# Patient Record
Sex: FEMALE | Race: ASIAN | Employment: FULL TIME | URBAN - METROPOLITAN AREA
[De-identification: names, ages, dates, MRNs, and addresses within clinical notes are randomized per-mention and may not be internally consistent; named-entity substitution may affect disease eponyms.]

---

## 2017-01-02 ENCOUNTER — NURSE ONLY (OUTPATIENT)
Dept: FAMILY MEDICINE CLINIC | Facility: CLINIC | Age: 27
End: 2017-01-02

## 2017-01-02 VITALS
RESPIRATION RATE: 16 BRPM | WEIGHT: 146 LBS | OXYGEN SATURATION: 99 % | SYSTOLIC BLOOD PRESSURE: 122 MMHG | HEART RATE: 65 BPM | BODY MASS INDEX: 24 KG/M2 | TEMPERATURE: 98 F | DIASTOLIC BLOOD PRESSURE: 76 MMHG

## 2017-01-02 DIAGNOSIS — H65.92 LEFT SEROUS OTITIS MEDIA, UNSPECIFIED CHRONICITY: Primary | ICD-10-CM

## 2017-01-02 DIAGNOSIS — J01.00 ACUTE MAXILLARY SINUSITIS, RECURRENCE NOT SPECIFIED: ICD-10-CM

## 2017-01-02 PROCEDURE — 99203 OFFICE O/P NEW LOW 30 MIN: CPT | Performed by: PHYSICIAN ASSISTANT

## 2017-01-02 RX ORDER — FLUTICASONE PROPIONATE 50 MCG
2 SPRAY, SUSPENSION (ML) NASAL DAILY
Qty: 1 BOTTLE | Refills: 0 | Status: SHIPPED | OUTPATIENT
Start: 2017-01-02 | End: 2017-01-02

## 2017-01-02 RX ORDER — FLUTICASONE PROPIONATE 50 MCG
2 SPRAY, SUSPENSION (ML) NASAL DAILY
Qty: 1 BOTTLE | Refills: 0 | Status: SHIPPED | OUTPATIENT
Start: 2017-01-02 | End: 2017-02-01

## 2017-01-02 RX ORDER — AMOXICILLIN AND CLAVULANATE POTASSIUM 875; 125 MG/1; MG/1
1 TABLET, FILM COATED ORAL 2 TIMES DAILY
Qty: 20 TABLET | Refills: 0 | Status: SHIPPED | OUTPATIENT
Start: 2017-01-02 | End: 2017-01-12

## 2017-01-02 RX ORDER — AMOXICILLIN AND CLAVULANATE POTASSIUM 875; 125 MG/1; MG/1
1 TABLET, FILM COATED ORAL 2 TIMES DAILY
Qty: 20 TABLET | Refills: 0 | Status: SHIPPED | OUTPATIENT
Start: 2017-01-02 | End: 2017-01-02

## 2017-01-02 NOTE — PATIENT INSTRUCTIONS
Finish course of antibiotics for sinusitis. Take antibiotic with food.      You may eat yogurt or take probiotics over the counter (such as Florastor or Align) if you would like to replace the good bacteria in your GI tract that can be removed by antibioti Sinuses are air-filled spaces in the skull behind the face. They are kept moist and clean by a lining of mucosa. Things such as pollen, smoke, and chemical fumes can irritate the mucosa. It can then become inflamed (swell up).  As a response to irritation,

## 2017-01-02 NOTE — PROGRESS NOTES
CHIEF COMPLAINT:   Patient presents with:  Sinus Problem      HPI:   James Muñoz is a 32year old female who presents for sinus congestion for  6  days. Ear also feels clogged. Sinus sx started last week. Left ear is feeling full.   She has had an ear in Alcohol Use: Yes           0.0 oz/week       0 Standard drinks or equivalent per week       Comment: very rarely        REVIEW OF SYSTEMS:   GENERAL: feels well otherwise, no unplanned weight change,  dec appetite  SKIN: no rashes or abnormal skin lesion -     Discontinue: Fluticasone Propionate 50 MCG/ACT Nasal Suspension; 2 sprays by Each Nare route daily.  -     Fluticasone Propionate 50 MCG/ACT Nasal Suspension; 2 sprays by Each Nare route daily. PLAN: Meds as below.   Comfort care instructions You may eat yogurt or take probiotics over the counter (such as Florastor or Align) if you would like to replace the good bacteria in your GI tract that can be removed by antibiotics. If you do take probiotics, take them between doses of the antibiotic. Sinuses are air-filled spaces in the skull behind the face. They are kept moist and clean by a lining of mucosa. Things such as pollen, smoke, and chemical fumes can irritate the mucosa. It can then become inflamed (swell up).  As a response to irritation,

## 2017-01-03 ENCOUNTER — TELEPHONE (OUTPATIENT)
Dept: FAMILY MEDICINE CLINIC | Facility: CLINIC | Age: 27
End: 2017-01-03

## 2017-01-03 NOTE — TELEPHONE ENCOUNTER
On Augmentin for sinusitis. Took one dose last night and tolerated well. Took second dose this morning along with breakfast (oatmeal and yogurt)  3-4 hours after second dose patient states she had \"burning epigastric pain. ..  Felt like stomach was twisti

## 2017-01-31 ENCOUNTER — TELEPHONE (OUTPATIENT)
Dept: GASTROENTEROLOGY | Facility: CLINIC | Age: 27
End: 2017-01-31

## 2017-01-31 DIAGNOSIS — R50.9 FEVER, UNSPECIFIED FEVER CAUSE: Primary | ICD-10-CM

## 2017-01-31 DIAGNOSIS — R10.13 EPIGASTRIC ABDOMINAL PAIN: ICD-10-CM

## 2017-01-31 NOTE — TELEPHONE ENCOUNTER
Pt called to find out if she can begin taking protonix twice daily again due to increased stomach pains. Please call.

## 2017-02-01 NOTE — TELEPHONE ENCOUNTER
I spoke to Aurea Escobare pascual Tri and her aunt today. She is suffering focal epigastric burning pain not radiating. Had been on pantoprazole once daily continuously up until now. New fever 101 last night and this am without cough, URI sx or other localizing symptoms.

## 2017-02-03 ENCOUNTER — HOSPITAL ENCOUNTER (OUTPATIENT)
Dept: ULTRASOUND IMAGING | Age: 27
Discharge: HOME | End: 2017-02-03
Attending: INTERNAL MEDICINE
Payer: COMMERCIAL

## 2017-02-03 DIAGNOSIS — R50.9 FEVER, UNSPECIFIED FEVER CAUSE: ICD-10-CM

## 2017-02-03 DIAGNOSIS — R10.13 EPIGASTRIC ABDOMINAL PAIN: ICD-10-CM

## 2017-02-03 PROCEDURE — 76705 ECHO EXAM OF ABDOMEN: CPT

## 2017-06-14 RX ORDER — PANTOPRAZOLE SODIUM 40 MG/1
40 TABLET, DELAYED RELEASE ORAL
Qty: 60 TABLET | Refills: 5 | Status: SHIPPED | OUTPATIENT
Start: 2017-06-14 | End: 2017-12-24

## 2017-06-14 RX ORDER — PANTOPRAZOLE SODIUM 40 MG/1
40 TABLET, DELAYED RELEASE ORAL
Qty: 30 TABLET | Refills: 5 | Status: SHIPPED | OUTPATIENT
Start: 2017-06-14 | End: 2017-06-14

## 2017-06-14 NOTE — TELEPHONE ENCOUNTER
From: Harry Gregory  To:  Yris Farley MD  Sent: 6/14/2017 12:46 AM CDT  Subject: Medication Renewal Request    Original authorizing provider: MD Harry Jefferson would like a refill of the following medications:  Rolf Campos

## 2017-06-14 NOTE — TELEPHONE ENCOUNTER
Dr. Clarence Giraldo, Rx request for Pantoprazole Sodium 40mg. Please review. Thank you. LOV: 10/23/15, No upcoming appts scheduled.    Last Refill: 11/21/16

## 2017-07-31 ENCOUNTER — OFFICE VISIT (OUTPATIENT)
Dept: FAMILY MEDICINE CLINIC | Facility: CLINIC | Age: 27
End: 2017-07-31

## 2017-07-31 ENCOUNTER — HOSPITAL ENCOUNTER (OUTPATIENT)
Age: 27
Discharge: HOME | End: 2017-07-31
Attending: FAMILY MEDICINE
Payer: COMMERCIAL

## 2017-07-31 VITALS
HEIGHT: 65 IN | SYSTOLIC BLOOD PRESSURE: 133 MMHG | WEIGHT: 140 LBS | OXYGEN SATURATION: 100 % | HEART RATE: 73 BPM | DIASTOLIC BLOOD PRESSURE: 89 MMHG | TEMPERATURE: 98 F | BODY MASS INDEX: 23.32 KG/M2 | RESPIRATION RATE: 16 BRPM

## 2017-07-31 VITALS
SYSTOLIC BLOOD PRESSURE: 130 MMHG | DIASTOLIC BLOOD PRESSURE: 70 MMHG | TEMPERATURE: 98 F | HEART RATE: 92 BPM | RESPIRATION RATE: 14 BRPM

## 2017-07-31 DIAGNOSIS — Z02.9 ENCOUNTERS FOR ADMINISTRATIVE PURPOSE: Primary | ICD-10-CM

## 2017-07-31 DIAGNOSIS — L03.115 CELLULITIS OF RIGHT ANKLE: Primary | ICD-10-CM

## 2017-07-31 PROCEDURE — 99214 OFFICE O/P EST MOD 30 MIN: CPT

## 2017-07-31 PROCEDURE — 99213 OFFICE O/P EST LOW 20 MIN: CPT

## 2017-07-31 RX ORDER — CLINDAMYCIN HYDROCHLORIDE 300 MG/1
300 CAPSULE ORAL 3 TIMES DAILY
Qty: 30 CAPSULE | Refills: 0 | Status: SHIPPED | OUTPATIENT
Start: 2017-07-31 | End: 2017-08-10

## 2017-07-31 NOTE — ED PROVIDER NOTES
Patient Seen in: 5 [facility]    History   Patient presents with:  Bite (integumentary)    Stated Complaint: RIGHT FOOT RED PAIN    HPI    Pt is a 31 yo who presents with a 3 day h/o right ankle swelling and redness.  There °C)  Temp src: Oral  SpO2: 100 %  O2 Device: None (Room air)    Current:/89   Pulse 73   Temp 98.2 °F (36.8 °C) (Oral)   Resp 16   Ht 165.1 cm (5' 5\")   Wt 63.5 kg   LMP 07/15/2017 (Exact Date)   SpO2 100%   BMI 23.30 kg/m²         Physical Exam   C

## 2017-07-31 NOTE — PROGRESS NOTES
Indivual in NAD presents with concern of skin infection after a possible bug bite, duration 3 days, [place] patient on OCP. Observation of right medial ankle; mild erythema and swelling, no streaking, dry.  Passive dorsi/plantar f

## 2017-07-31 NOTE — ED INITIAL ASSESSMENT (HPI)
PATIENT ARRIVED AMBULATORY TO ROOM C/O AN INSECT BITE TO THE RIGHT ANKLE. PATIENT STATES \"I JUST GOT BACK FROM THE [facility]. I REMEMBER GETTING A BITE WHILE ON VACATION. I NOTICED AFTER THE FLIGHT MY ANKLE WAS SWOLLEN\" NO FEVERS.

## 2017-08-03 ENCOUNTER — OFFICE VISIT (OUTPATIENT)
Dept: FAMILY MEDICINE CLINIC | Facility: CLINIC | Age: 27
End: 2017-08-03

## 2017-08-03 VITALS
HEART RATE: 78 BPM | DIASTOLIC BLOOD PRESSURE: 82 MMHG | BODY MASS INDEX: 23.99 KG/M2 | TEMPERATURE: 98 F | WEIGHT: 144 LBS | SYSTOLIC BLOOD PRESSURE: 123 MMHG | HEIGHT: 65 IN

## 2017-08-03 DIAGNOSIS — L03.115 CELLULITIS OF RIGHT LOWER EXTREMITY: Primary | ICD-10-CM

## 2017-08-03 PROCEDURE — 99203 OFFICE O/P NEW LOW 30 MIN: CPT | Performed by: NURSE PRACTITIONER

## 2017-08-03 NOTE — PATIENT INSTRUCTIONS
Discharge Instructions for Cellulitis  You have been diagnosed with cellulitis. This is an infection in the deepest layer of the skin. In some cases, the infection also affects the muscle. Cellulitis is caused by bacteria.  The bacteria can enter the body Date Last Reviewed: 8/1/2016  © 6587-5875 The [address] [place] [address], [address] [place], [place] [address] [employer]. All rights reserved. This information is not intended as a substitute for professional medical care.  Always follow your healthcare professional'

## 2017-08-03 NOTE — PROGRESS NOTES
Pt presents for f/u from urgent care-was bit by a bug and right inner ankle became red, swollen and very painful (was traveling home from [address] [facility]).  Was started on Clindamycin-has been taking since Monday evening-s/s are much improved and redness and pa (300 mg total) by mouth 3 (three) times daily. Disp: 30 capsule Rfl: 0   Pantoprazole Sodium 40 MG Oral Tab EC Take 1 tablet (40 mg total) by mouth 2 (two) times daily before meals.  Disp: 60 tablet Rfl: 5   ORTHO TRI-CYCLEN LO 0.18/0.215/0.25 MG-25 MCG [employer]

## 2017-11-29 ENCOUNTER — OFFICE VISIT (OUTPATIENT)
Dept: FAMILY MEDICINE CLINIC | Facility: CLINIC | Age: 27
End: 2017-11-29

## 2017-11-29 VITALS
SYSTOLIC BLOOD PRESSURE: 120 MMHG | DIASTOLIC BLOOD PRESSURE: 80 MMHG | BODY MASS INDEX: 23.66 KG/M2 | WEIGHT: 142 LBS | HEART RATE: 58 BPM | HEIGHT: 65 IN

## 2017-11-29 DIAGNOSIS — Z01.419 WELL WOMAN EXAM WITH ROUTINE GYNECOLOGICAL EXAM: Primary | ICD-10-CM

## 2017-11-29 DIAGNOSIS — Z12.4 ENCOUNTER FOR PAPANICOLAOU SMEAR FOR CERVICAL CANCER SCREENING: ICD-10-CM

## 2017-11-29 DIAGNOSIS — H91.92 HEARING LOSS OF LEFT EAR, UNSPECIFIED HEARING LOSS TYPE: ICD-10-CM

## 2017-11-29 PROBLEM — L03.115 CELLULITIS OF RIGHT LOWER EXTREMITY: Status: RESOLVED | Noted: 2017-08-03 | Resolved: 2017-11-29

## 2017-11-29 PROCEDURE — 99395 PREV VISIT EST AGE 18-39: CPT | Performed by: NURSE PRACTITIONER

## 2017-11-29 RX ORDER — NORGESTIMATE AND ETHINYL ESTRADIOL 7DAYSX3 LO
1 KIT ORAL DAILY
Qty: 28 TABLET | Refills: 12 | Status: SHIPPED | OUTPATIENT
Start: 2017-11-29 | End: 2018-11-05

## 2017-11-29 NOTE — PROGRESS NOTES
HPI  Pt here for well adult and female visit. Had screening labs done earlier in year and pt states were wnl.     Well woman:   Periods regular on ocps-very heavy and irregular when off  Currently sexually active-monogomous  Birth control-ocp's takes daily History  Social History   Marital status: Single  Spouse name: N/A    Years of education: N/A  Number of children: N/A     Occupational History  None on file     Social History Main Topics   Smoking status: Never Smoker    Smokeless tobacco: Never Used Genitourinary: Rectum normal. No breast swelling, tenderness or discharge. No labial fusion. There is no rash, tenderness, lesion or injury on the right labia. There is no rash, tenderness, lesion or injury on the left labia.  Uterus is not deviated, not

## 2017-12-26 ENCOUNTER — OFFICE VISIT (OUTPATIENT)
Dept: FAMILY MEDICINE CLINIC | Facility: CLINIC | Age: 27
End: 2017-12-26

## 2017-12-26 VITALS
RESPIRATION RATE: 14 BRPM | TEMPERATURE: 98 F | OXYGEN SATURATION: 98 % | HEART RATE: 72 BPM | SYSTOLIC BLOOD PRESSURE: 120 MMHG | DIASTOLIC BLOOD PRESSURE: 74 MMHG

## 2017-12-26 DIAGNOSIS — J06.9 VIRAL UPPER RESPIRATORY TRACT INFECTION WITH COUGH: Primary | ICD-10-CM

## 2017-12-26 PROCEDURE — 99213 OFFICE O/P EST LOW 20 MIN: CPT | Performed by: PHYSICIAN ASSISTANT

## 2017-12-26 RX ORDER — PANTOPRAZOLE SODIUM 40 MG/1
TABLET, DELAYED RELEASE ORAL
Qty: 60 TABLET | Refills: 5 | Status: SHIPPED | OUTPATIENT
Start: 2017-12-26 | End: 2019-01-31 | Stop reason: ALTCHOICE

## 2017-12-26 NOTE — TELEPHONE ENCOUNTER
Pending Prescriptions Disp Refills    PANTOPRAZOLE SODIUM 40 MG Oral Tab EC [Pharmacy Med Name: Pantoprazole Sodium Oral Tablet Delayed Release 40 MG] 60 tablet 2     Sig: TAKE ONE TABLET BY MOUTH 2 TIMES PER DAY BEFORE MEALS         See refill request  Patient last seen 10-23-15.    Medication last refilled 6-14-17

## 2017-12-27 NOTE — PROGRESS NOTES
CHIEF COMPLAINT:   Patient presents with:  Cough/URI: cold sx since last wednesday, cough worse, ear popping, loss of voice      HPI:   Nneka Sparks is a 32year old female who presents for upper respiratory symptoms for  6 days.  Patient reports sore thro EARS: TM's non injected, left with moderate bulging, no retraction, no fluid, bony landmarks visible  NOSE: Nostrils patent, minimal nasal discharge, nasal mucosa mildly inflamed   THROAT: Oral mucosa pink, moist. Posterior pharynx is noned erythematous. · Relax, lie down. Go to bed if you want. Just get off your feet and rest. Also, drink plenty of fluids to avoid dehydration. · Take acetaminophen or a nonsteroidal anti-inflammatory agent (NSAID), such as ibuprofen.   Treat a troubled nose kindly  · Breat · Signs of dehydration, including extreme thirst, dark urine, infrequent urination, dry mouth  · Spotted, red, or very sore throat   Date Last Reviewed: 12/1/2016  © 7903-7669 The [address]. [address] [place], [address] [place] [address].  All rights

## 2017-12-29 ENCOUNTER — OFFICE VISIT (OUTPATIENT)
Dept: INTERNAL MEDICINE CLINIC | Facility: HOSPITAL | Age: 27
End: 2017-12-29
Attending: EMERGENCY MEDICINE

## 2017-12-29 DIAGNOSIS — J11.1 INFLUENZA: Primary | ICD-10-CM

## 2017-12-29 PROCEDURE — 87631 RESP VIRUS 3-5 TARGETS: CPT

## 2018-01-25 ENCOUNTER — APPOINTMENT (OUTPATIENT)
Dept: GENERAL RADIOLOGY | Age: 28
End: 2018-01-25
Attending: PHYSICIAN ASSISTANT
Payer: COMMERCIAL

## 2018-01-25 ENCOUNTER — HOSPITAL ENCOUNTER (OUTPATIENT)
Age: 28
Discharge: HOME | End: 2018-01-25
Payer: COMMERCIAL

## 2018-01-25 VITALS
OXYGEN SATURATION: 98 % | TEMPERATURE: 98 F | RESPIRATION RATE: 16 BRPM | SYSTOLIC BLOOD PRESSURE: 151 MMHG | DIASTOLIC BLOOD PRESSURE: 97 MMHG | HEART RATE: 76 BPM

## 2018-01-25 DIAGNOSIS — S50.01XA CONTUSION OF RIGHT ELBOW, INITIAL ENCOUNTER: Primary | ICD-10-CM

## 2018-01-25 DIAGNOSIS — S16.1XXA STRAIN OF NECK MUSCLE, INITIAL ENCOUNTER: ICD-10-CM

## 2018-01-25 PROCEDURE — 99213 OFFICE O/P EST LOW 20 MIN: CPT

## 2018-01-25 PROCEDURE — 73080 X-RAY EXAM OF ELBOW: CPT | Performed by: PHYSICIAN ASSISTANT

## 2018-01-26 NOTE — ED INITIAL ASSESSMENT (HPI)
REPORTS RIGHT ELBOW PAIN AFTER SLIPPING ON ICE YESTERDAY. SHAWNEE HURTADO AT BEDSIDE. ALSO C/O MUSCULAR NECK PAIN.

## 2018-01-26 NOTE — ED PROVIDER NOTES
Patient Seen in: [facility]    History   Patient presents with:  Upper Extremity Injury (musculoskeletal)    Stated Complaint: Elbow Injury    HPI    Patient is a 68-year-old female who presents for evaluation of R elbow Neck: Normal range of motion. Neck supple. Muscular tenderness present. No spinous process tenderness present. Normal range of motion present. No Brudzinski's sign and no Kernig's sign noted.    Full range of motion C-spine, tenderness over bilateral cervic

## 2018-02-15 ENCOUNTER — OFFICE VISIT (OUTPATIENT)
Dept: FAMILY MEDICINE CLINIC | Facility: CLINIC | Age: 28
End: 2018-02-15

## 2018-02-15 VITALS
SYSTOLIC BLOOD PRESSURE: 134 MMHG | TEMPERATURE: 99 F | DIASTOLIC BLOOD PRESSURE: 81 MMHG | BODY MASS INDEX: 23.82 KG/M2 | WEIGHT: 143 LBS | HEART RATE: 87 BPM | HEIGHT: 65 IN

## 2018-02-15 DIAGNOSIS — J11.1 INFLUENZA-LIKE ILLNESS: Primary | ICD-10-CM

## 2018-02-15 LAB
ADENOVIRUS PCR:: NEGATIVE
B PERT DNA SPEC QL NAA+PROBE: NEGATIVE
C PNEUM DNA SPEC QL NAA+PROBE: NEGATIVE
CORONAVIRUS 229E PCR:: NEGATIVE
CORONAVIRUS HKU1 PCR:: NEGATIVE
CORONAVIRUS NL63 PCR:: NEGATIVE
CORONAVIRUS OC43 PCR:: NEGATIVE
FLUAV RNA SPEC QL NAA+PROBE: NEGATIVE
FLUBV RNA SPEC QL NAA+PROBE: NEGATIVE
METAPNEUMOVIRUS PCR:: NEGATIVE
MYCOPLASMA PNEUMONIA PCR:: NEGATIVE
PARAINFLUENZA 1 PCR:: NEGATIVE
PARAINFLUENZA 2 PCR:: NEGATIVE
PARAINFLUENZA 3 PCR:: NEGATIVE
PARAINFLUENZA 4 PCR:: NEGATIVE
RHINOVIRUS/ENTERO PCR:: NEGATIVE
RSV RNA SPEC QL NAA+PROBE: NEGATIVE

## 2018-02-15 PROCEDURE — 99213 OFFICE O/P EST LOW 20 MIN: CPT | Performed by: NURSE PRACTITIONER

## 2018-02-15 NOTE — PROGRESS NOTES
HPI  Pt presents for Headache, fever (max 100.2), cough, body aches, sore throat and fatigue for past 3 days. Works on [employer] at [employer].  Got flu shot this year. Has been taking otc meds. Feeling a little better today.  Needs to be swabb Smoker    Smokeless tobacco: Never Used    Alcohol use Yes  0.0 oz/week     Comment: very rarely    Drug use: No    Sexual activity: Not on file     Other Topics Concern   None on file     Social History Narrative   None on file         Current Outpatient Judgment and thought content normal.   Nursing note and vitals reviewed. Assessment:     1. Influenza-like illness        Plan:     1.  Nasal swab for resp panel  Supportive care discussed    -If you/your child develops chest pain, shortness of breath,

## 2018-02-15 NOTE — PATIENT INSTRUCTIONS
Treatment of Flu    Knowing what to do if you get the flu may help you feel better sooner and prevent others from becoming sick. If you get the flu  Stay home and rest.  Drink fluids. Take medicines for fever such as acetaminophen or ibuprofen.   Clean this way. Consider wearing a mask when giving care to a sick person.   When to seek medical care  Call your health care provider within 48 hours if you or someone else with the flu:    Has common flu symptoms  Has these symptoms:  Not able to drink enough

## 2018-02-19 ENCOUNTER — NURSE ONLY (OUTPATIENT)
Dept: FAMILY MEDICINE CLINIC | Facility: CLINIC | Age: 28
End: 2018-02-19

## 2018-02-19 VITALS
SYSTOLIC BLOOD PRESSURE: 122 MMHG | WEIGHT: 146 LBS | BODY MASS INDEX: 24 KG/M2 | HEART RATE: 92 BPM | DIASTOLIC BLOOD PRESSURE: 74 MMHG | TEMPERATURE: 98 F | OXYGEN SATURATION: 100 %

## 2018-02-19 DIAGNOSIS — J01.01 ACUTE RECURRENT MAXILLARY SINUSITIS: ICD-10-CM

## 2018-02-19 DIAGNOSIS — J20.9 ACUTE BRONCHITIS, UNSPECIFIED ORGANISM: Primary | ICD-10-CM

## 2018-02-19 PROCEDURE — 99213 OFFICE O/P EST LOW 20 MIN: CPT | Performed by: PHYSICIAN ASSISTANT

## 2018-02-19 RX ORDER — AMOXICILLIN AND CLAVULANATE POTASSIUM 875; 125 MG/1; MG/1
1 TABLET, FILM COATED ORAL 2 TIMES DAILY
Qty: 20 TABLET | Refills: 0 | Status: SHIPPED | OUTPATIENT
Start: 2018-02-19 | End: 2018-03-01

## 2018-02-19 RX ORDER — ALBUTEROL SULFATE 90 UG/1
2 AEROSOL, METERED RESPIRATORY (INHALATION) EVERY 6 HOURS PRN
Qty: 1 INHALER | Refills: 0 | Status: SHIPPED | OUTPATIENT
Start: 2018-02-19 | End: 2018-10-14

## 2018-02-19 RX ORDER — BENZONATATE 200 MG/1
200 CAPSULE ORAL 3 TIMES DAILY PRN
Qty: 30 CAPSULE | Refills: 0 | Status: SHIPPED | OUTPATIENT
Start: 2018-02-19 | End: 2018-02-19

## 2018-02-19 RX ORDER — BENZONATATE 200 MG/1
200 CAPSULE ORAL 3 TIMES DAILY PRN
Qty: 30 CAPSULE | Refills: 0 | Status: SHIPPED | OUTPATIENT
Start: 2018-02-19 | End: 2018-10-14

## 2018-02-19 RX ORDER — ALBUTEROL SULFATE 90 UG/1
2 AEROSOL, METERED RESPIRATORY (INHALATION) EVERY 6 HOURS PRN
Qty: 1 INHALER | Refills: 0 | Status: SHIPPED | OUTPATIENT
Start: 2018-02-19 | End: 2018-02-19

## 2018-02-19 RX ORDER — AMOXICILLIN AND CLAVULANATE POTASSIUM 875; 125 MG/1; MG/1
1 TABLET, FILM COATED ORAL 2 TIMES DAILY
Qty: 20 TABLET | Refills: 0 | Status: SHIPPED | OUTPATIENT
Start: 2018-02-19 | End: 2018-02-19

## 2018-02-20 NOTE — PROGRESS NOTES
CHIEF COMPLAINT:   Patient presents with:  URI      HPI:   Yo Hill is a 32year old female who presents for upper respiratory symptoms for  1 weeks. Patient reports: cough, sore throat, sinus congestion, ears popping. Fever in the beginning.  Tested f REVIEW OF SYSTEMS:   GENERAL: feels well otherwise,    SKIN: no rashes or abnormal skin lesions  HEENT: See HPI  LUNGS: See HPI. Denies pleuritic pain.   CARDIOVASCULAR: denies chest pain or palpitations  GI: denies N/V/C or abdominal pain  NEURO: Denies h -     Discontinue: Albuterol Sulfate HFA (PROAIR HFA) 108 (90 Base) MCG/ACT Inhalation Aero Soln; Inhale 2 puffs into the lungs every 6 (six) hours as needed for Wheezing.  -     Amoxicillin-Pot Clavulanate 875-125 MG Oral Tab;  Take 1 tablet by mouth 2 (tw Acute or short-term bronchitis last for days or weeks. It occurs when the bronchial tubes (airways in the lungs) are irritated by a virus, bacteria, or allergen. This causes a cough that produces yellow or greenish mucus.   Inside Healthy Lungs  Air travels · Tests to check for an underlying condition, such as allergies, asthma, or COPD. You may be referred to a specialist for further lung function testing. Treating a Cough  The main treatment for bronchitis is easing symptoms.  Avoiding smoke, allergens, and · Wash your hands often. This helps reduce the chance of picking up viruses that cause colds and flu. Call [facility] Provider If:  · Symptoms worsen, or new symptoms develop. · Breathing problems worsen or  become severe.   · Symptoms don’t improve

## 2018-10-14 ENCOUNTER — NURSE ONLY (OUTPATIENT)
Dept: FAMILY MEDICINE CLINIC | Facility: CLINIC | Age: 28
End: 2018-10-14
Payer: COMMERCIAL

## 2018-10-14 VITALS
OXYGEN SATURATION: 99 % | HEIGHT: 65 IN | DIASTOLIC BLOOD PRESSURE: 78 MMHG | SYSTOLIC BLOOD PRESSURE: 122 MMHG | HEART RATE: 62 BPM | WEIGHT: 140 LBS | RESPIRATION RATE: 12 BRPM | TEMPERATURE: 97 F | BODY MASS INDEX: 23.32 KG/M2

## 2018-10-14 DIAGNOSIS — N30.01 ACUTE CYSTITIS WITH HEMATURIA: Primary | ICD-10-CM

## 2018-10-14 PROCEDURE — 87086 URINE CULTURE/COLONY COUNT: CPT | Performed by: NURSE PRACTITIONER

## 2018-10-14 PROCEDURE — 99213 OFFICE O/P EST LOW 20 MIN: CPT | Performed by: NURSE PRACTITIONER

## 2018-10-14 PROCEDURE — 81003 URINALYSIS AUTO W/O SCOPE: CPT | Performed by: NURSE PRACTITIONER

## 2018-10-14 RX ORDER — NITROFURANTOIN 25; 75 MG/1; MG/1
CAPSULE ORAL
Qty: 14 CAPSULE | Refills: 0 | Status: SHIPPED | OUTPATIENT
Start: 2018-10-14 | End: 2019-01-31

## 2018-10-14 RX ORDER — PHENAZOPYRIDINE HYDROCHLORIDE 200 MG/1
200 TABLET, FILM COATED ORAL 3 TIMES DAILY PRN
Qty: 10 TABLET | Refills: 0 | Status: SHIPPED | OUTPATIENT
Start: 2018-10-14 | End: 2019-01-31 | Stop reason: ALTCHOICE

## 2018-10-14 NOTE — PROGRESS NOTES
Aubrie Parsons is a 32year old female. CHIEF COMPLAINT:   Patient presents with:  UTI: dysuria, bladder spasms, urgency      HPI:   Patient presents with symptoms of UTI. Reports 1 day history of urinary frequency, urgency and dysuria.   Denies flank pain, LUNGS: clear to ausculation bilaterally, no wheezing or rhonchi  LYMPH: No cervical lymphadenopathy  : mild suprapubic tenderness, no bladder distention, no CVAT     ASSESSMENT AND PLAN:   James Muñoz is a 32year old female who presents with Patient p The most common place for an infection is in the bladder. This is called a bladder infection. This is one of the most common infections in women. Most bladder infections are easily treated. They are not serious unless the infection spreads to the kidney. Bladder infections are diagnosed by a urine test. They are treated with antibiotics and usually clear up quickly without complications. Treatment helps prevent a more serious kidney infection.   Medicines  Medicines can help in the treatment of a bladder in Call your healthcare provider if all symptoms are not gone after 3 days of treatment. This is especially important if you have repeat infections. If a culture was done, you will be told if your treatment needs to be changed.  If directed, you can call to f

## 2018-11-05 RX ORDER — NORGESTIMATE AND ETHINYL ESTRADIOL
KIT
Qty: 28 TABLET | Refills: 2 | Status: SHIPPED | OUTPATIENT
Start: 2018-11-05 | End: 2019-01-31

## 2019-01-26 RX ORDER — NORGESTIMATE AND ETHINYL ESTRADIOL
KIT
Qty: 28 TABLET | Refills: 2 | OUTPATIENT
Start: 2019-01-26

## 2019-01-28 RX ORDER — NORGESTIMATE AND ETHINYL ESTRADIOL 7DAYSX3 LO
1 KIT ORAL
Qty: 28 TABLET | Refills: 2 | OUTPATIENT
Start: 2019-01-28

## 2019-01-29 NOTE — TELEPHONE ENCOUNTER
Left message for pt to call us back. When pt returns the call please assist to make a follow up appt per below.

## 2019-01-31 ENCOUNTER — OFFICE VISIT (OUTPATIENT)
Dept: FAMILY MEDICINE CLINIC | Facility: CLINIC | Age: 29
End: 2019-01-31
Payer: COMMERCIAL

## 2019-01-31 ENCOUNTER — APPOINTMENT (OUTPATIENT)
Dept: LAB | Age: 29
End: 2019-01-31
Attending: NURSE PRACTITIONER
Payer: COMMERCIAL

## 2019-01-31 VITALS
DIASTOLIC BLOOD PRESSURE: 83 MMHG | WEIGHT: 152 LBS | SYSTOLIC BLOOD PRESSURE: 124 MMHG | HEART RATE: 54 BPM | BODY MASS INDEX: 25.33 KG/M2 | HEIGHT: 65 IN

## 2019-01-31 DIAGNOSIS — Z30.41 ENCOUNTER FOR SURVEILLANCE OF CONTRACEPTIVE PILLS: ICD-10-CM

## 2019-01-31 DIAGNOSIS — Z00.00 WELL ADULT EXAM: ICD-10-CM

## 2019-01-31 DIAGNOSIS — Z00.00 WELL ADULT EXAM: Primary | ICD-10-CM

## 2019-01-31 DIAGNOSIS — G47.9 SLEEP DIFFICULTIES: ICD-10-CM

## 2019-01-31 LAB
ALBUMIN SERPL BCP-MCNC: 4 G/DL (ref 3.5–4.8)
ALBUMIN/GLOB SERPL: 1.1 {RATIO} (ref 1–2)
ALP SERPL-CCNC: 44 U/L (ref 32–100)
ALT SERPL-CCNC: 14 U/L (ref 14–54)
ANION GAP SERPL CALC-SCNC: 11 MMOL/L (ref 0–18)
AST SERPL-CCNC: 18 U/L (ref 15–41)
BILIRUB SERPL-MCNC: 0.5 MG/DL (ref 0.3–1.2)
BUN SERPL-MCNC: 15 MG/DL (ref 8–20)
BUN/CREAT SERPL: 18.5 (ref 10–20)
CALCIUM SERPL-MCNC: 9.2 MG/DL (ref 8.5–10.5)
CHLORIDE SERPL-SCNC: 102 MMOL/L (ref 95–110)
CHOLEST SERPL-MCNC: 202 MG/DL (ref 110–200)
CO2 SERPL-SCNC: 25 MMOL/L (ref 22–32)
CREAT SERPL-MCNC: 0.81 MG/DL (ref 0.5–1.5)
DEPRECATED RDW RBC AUTO: 39.8 FL (ref 35.1–46.3)
ERYTHROCYTE [DISTWIDTH] IN BLOOD BY AUTOMATED COUNT: 12.4 % (ref 11–15)
EST. AVERAGE GLUCOSE BLD GHB EST-MCNC: 123 MG/DL (ref 68–126)
GLOBULIN PLAS-MCNC: 3.7 G/DL (ref 2.5–3.7)
GLUCOSE SERPL-MCNC: 91 MG/DL (ref 70–99)
HBA1C MFR BLD HPLC: 5.9 % (ref ?–5.7)
HCT VFR BLD AUTO: 41.4 % (ref 35–48)
HDLC SERPL-MCNC: 80 MG/DL
HGB BLD-MCNC: 13.5 G/DL (ref 12–16)
LDLC SERPL CALC-MCNC: 109 MG/DL (ref 0–99)
MCH RBC QN AUTO: 28.8 PG (ref 26–34)
MCHC RBC AUTO-ENTMCNC: 32.6 G/DL (ref 31–37)
MCV RBC AUTO: 88.3 FL (ref 80–100)
NONHDLC SERPL-MCNC: 122 MG/DL
OSMOLALITY UR CALC.SUM OF ELEC: 286 MOSM/KG (ref 275–295)
PATIENT FASTING: YES
PLATELET # BLD AUTO: 270 10(3)UL (ref 150–450)
POTASSIUM SERPL-SCNC: 3.6 MMOL/L (ref 3.3–5.1)
PROT SERPL-MCNC: 7.7 G/DL (ref 5.9–8.4)
RBC # BLD AUTO: 4.69 X10(6)UL (ref 3.8–5.3)
SODIUM SERPL-SCNC: 138 MMOL/L (ref 136–144)
TRIGL SERPL-MCNC: 65 MG/DL (ref 1–149)
TSH SERPL-ACNC: 0.65 UIU/ML (ref 0.45–5.33)
VIT B12 SERPL-MCNC: 408 PG/ML (ref 181–914)
WBC # BLD AUTO: 5.5 X10(3) UL (ref 4–11)

## 2019-01-31 PROCEDURE — 80061 LIPID PANEL: CPT

## 2019-01-31 PROCEDURE — 99395 PREV VISIT EST AGE 18-39: CPT | Performed by: NURSE PRACTITIONER

## 2019-01-31 PROCEDURE — 36415 COLL VENOUS BLD VENIPUNCTURE: CPT

## 2019-01-31 PROCEDURE — 80053 COMPREHEN METABOLIC PANEL: CPT

## 2019-01-31 PROCEDURE — 84443 ASSAY THYROID STIM HORMONE: CPT

## 2019-01-31 PROCEDURE — 82306 VITAMIN D 25 HYDROXY: CPT

## 2019-01-31 PROCEDURE — 82607 VITAMIN B-12: CPT

## 2019-01-31 PROCEDURE — 83036 HEMOGLOBIN GLYCOSYLATED A1C: CPT

## 2019-01-31 PROCEDURE — 85027 COMPLETE CBC AUTOMATED: CPT

## 2019-01-31 RX ORDER — NORGESTIMATE AND ETHINYL ESTRADIOL 7DAYSX3 LO
1 KIT ORAL
Qty: 3 PACKAGE | Refills: 3 | Status: SHIPPED | OUTPATIENT
Start: 2019-01-31 | End: 2019-11-22

## 2019-01-31 NOTE — ASSESSMENT & PLAN NOTE
Enc healthy eating and exercise  Screening labs ordered  Refill ocps  Had flu shot this season   Pap due 2/2020

## 2019-01-31 NOTE — PATIENT INSTRUCTIONS
Prevention Guidelines, Women Ages 25 to 44  Screening tests and vaccines are an important part of managing your health. A screening test is done to find possible disorders or diseases in people who don't have any symptoms.  The goal is to find a disease e Type 2 diabetes All women with prediabetes Every year   Gonorrhea Sexually active women at increased risk for infection At routine exams   Hepatitis C Anyone at increased risk At routine exams   HIV All women should be tested at least once for HIV between Meningococcal Women at increased risk for infection should talk with their healthcare provider 1 or more doses   Pneumococcal conjugate vaccine (PCV13) and pneumococcal polysaccharide vaccine (PPSV23) Women at increased risk for infection should talk with © 9528-0777 The [address]. [address] [facility] [place] [place]. All rights reserved. This information is not intended as a substitute for professional medical care. Always follow your healthcare professional's instructions.

## 2019-01-31 NOTE — PROGRESS NOTES
HPI  Pt here for refill of ocp's. Would like to do physical. Has weaned off pantaprazole. Has been watching diet a lot and knows what triggers it. Has been having sleep issues.   Can fall asleep but wakes up in a few hours after falling asleep and the Disorder Maternal Grandmother    • Lung Disorder Maternal Grandmother    • Diabetes Maternal Grandfather    • Cancer Maternal Grandfather    • Colon Cancer Maternal Grandfather    • Heart Disorder Maternal Grandfather    • Other (Other[other]) Paternal Gra discharge. Left eye exhibits no discharge. Neck: Normal range of motion. Neck supple. No thyromegaly present. Cardiovascular: Normal rate, regular rhythm and normal heart sounds. No murmur heard. Edema not present.   Pulmonary/Chest: Effort normal

## 2019-02-01 LAB — 25(OH)D3 SERPL-MCNC: 24.7 NG/ML (ref 30–100)

## 2019-11-22 ENCOUNTER — OFFICE VISIT (OUTPATIENT)
Dept: FAMILY MEDICINE CLINIC | Facility: CLINIC | Age: 29
End: 2019-11-22
Payer: COMMERCIAL

## 2019-11-22 VITALS
WEIGHT: 138 LBS | HEIGHT: 65 IN | DIASTOLIC BLOOD PRESSURE: 76 MMHG | SYSTOLIC BLOOD PRESSURE: 130 MMHG | HEART RATE: 62 BPM | BODY MASS INDEX: 22.99 KG/M2

## 2019-11-22 DIAGNOSIS — Z00.00 WELL ADULT EXAM: Primary | ICD-10-CM

## 2019-11-22 DIAGNOSIS — G47.9 SLEEP DIFFICULTIES: ICD-10-CM

## 2019-11-22 DIAGNOSIS — Z30.41 ENCOUNTER FOR SURVEILLANCE OF CONTRACEPTIVE PILLS: ICD-10-CM

## 2019-11-22 PROCEDURE — 99395 PREV VISIT EST AGE 18-39: CPT | Performed by: NURSE PRACTITIONER

## 2019-11-22 RX ORDER — NORGESTIMATE AND ETHINYL ESTRADIOL 7DAYSX3 LO
1 KIT ORAL
Qty: 3 PACKAGE | Refills: 3 | Status: SHIPPED | OUTPATIENT
Start: 2019-11-22 | End: 2020-01-02

## 2019-11-22 NOTE — ASSESSMENT & PLAN NOTE
Can try extended release melatonin  White noise or \"Weightless\" by Fabrizio Cannon  Please call if symptoms worsen or are not resolving.

## 2019-11-22 NOTE — PROGRESS NOTES
HPI  Pt here for annual physical.  Denies acute or chronic complaints. Working out 3-4 times per week. Has lost 14 lbs since January. Has improved her diet. Sleep has improved. Can fall asleep will sometimes have a hard time staying asleep.  S/s are Maternal Grandfather    • Cancer Maternal Grandfather    • Colon Cancer Maternal Grandfather    • Heart Disorder Maternal Grandfather    • Other (Other) Paternal Grandmother    • Other (Other) Paternal Grandfather        Social History    Socioeconomic His Murrel Apgar*    HIVES    Comment:Pt has taken similar medication as an adult             without issue    Physical Exam   Nursing note and vitals reviewed. Constitutional: She is oriented to person, place, and time. She appears well-developed and well-nourished. cardio is preferred.     Had flu shot   Due for pap next year  Enc healthy habits and activities         Encounter for surveillance of contraceptive pills     One year refill         Relevant Medications    Norgestim-Eth Estrad Triphasic (TRI-LO-OLEKSANDR) 0.1

## 2019-11-22 NOTE — PATIENT INSTRUCTIONS
Prevention Guidelines, Women Ages 25 to 44  Screening tests and vaccines are an important part of managing your health. A screening test is done to find possible disorders or diseases in people who don't have any symptoms.  The goal is to find a disease e Type 2 diabetes, prediabetes All women diagnosed with gestational diabetes Lifelong testing every 3 years   Type 2 diabetes All women with prediabetes Every year   Gonorrhea Sexually active women at increased risk for infection At routine exams   Hepatitis Measles, mumps, rubella (MMR) All women in this age group who have no record of these infections or vaccines 1 or 2 doses   Meningococcal Women at increased risk for infection should talk with their healthcare provider 1 or more doses   Pneumococcal conjug © 1945-4702 The [address]. [address] [facility], [facility] [place]. All rights reserved. This information is not intended as a substitute for professional medical care. Always follow your healthcare professional's instructions.         Pinky · Make sure the room is not too hot or too cold. If it’s not dark enough, an eye mask can help. If it’s noisy, try using earplugs. · Don't eat a large meal just before bedtime.   · Remove noises, bright lights, TVs, cell phones, and computers from your sle

## 2019-12-30 DIAGNOSIS — Z30.41 ENCOUNTER FOR SURVEILLANCE OF CONTRACEPTIVE PILLS: ICD-10-CM

## 2019-12-31 RX ORDER — NORGESTIMATE AND ETHINYL ESTRADIOL 7DAYSX3 LO
1 KIT ORAL
Qty: 3 PACKAGE | Refills: 3 | OUTPATIENT
Start: 2019-12-31

## 2019-12-31 RX ORDER — NORGESTIMATE AND ETHINYL ESTRADIOL
KIT
Qty: 84 TABLET | Refills: 3 | OUTPATIENT
Start: 2019-12-31

## 2020-01-02 ENCOUNTER — TELEPHONE (OUTPATIENT)
Dept: FAMILY MEDICINE CLINIC | Facility: CLINIC | Age: 30
End: 2020-01-02

## 2020-01-02 DIAGNOSIS — Z30.41 ENCOUNTER FOR SURVEILLANCE OF CONTRACEPTIVE PILLS: ICD-10-CM

## 2020-01-02 RX ORDER — NORGESTIMATE AND ETHINYL ESTRADIOL 7DAYSX3 LO
1 KIT ORAL
Qty: 3 PACKAGE | Refills: 3 | Status: SHIPPED | OUTPATIENT
Start: 2020-01-02 | End: 2020-11-30

## 2020-01-02 NOTE — TELEPHONE ENCOUNTER
Patient called in stating that pharmacy is denying having refills on medication below and patient is asking for assistance getting that to them. Confirmed pharmacy on chart. Please advise.        Current Outpatient Medications:   •  Norgestim-Eth Est

## 2020-01-02 NOTE — TELEPHONE ENCOUNTER
Pear the patient  [facility] and is asking to be [facility] from 11/22/19    [facility]

## 2020-01-02 NOTE — TELEPHONE ENCOUNTER
Pt returned the call and verified the pharmacy, [facility] in [place]. Informed her that there are 3 refills available. She will call the pharmacy to follow up.

## 2020-02-28 ENCOUNTER — TELEPHONE (OUTPATIENT)
Dept: FAMILY MEDICINE CLINIC | Facility: CLINIC | Age: 30
End: 2020-02-28

## 2020-02-28 ENCOUNTER — APPOINTMENT (OUTPATIENT)
Dept: LAB | Age: 30
End: 2020-02-28
Attending: PHYSICIAN ASSISTANT
Payer: COMMERCIAL

## 2020-02-28 ENCOUNTER — OFFICE VISIT (OUTPATIENT)
Dept: FAMILY MEDICINE CLINIC | Facility: CLINIC | Age: 30
End: 2020-02-28
Payer: COMMERCIAL

## 2020-02-28 ENCOUNTER — LAB ENCOUNTER (OUTPATIENT)
Dept: LAB | Age: 30
End: 2020-02-28
Attending: PHYSICIAN ASSISTANT
Payer: COMMERCIAL

## 2020-02-28 VITALS
BODY MASS INDEX: 22.66 KG/M2 | HEIGHT: 65 IN | TEMPERATURE: 98 F | HEART RATE: 60 BPM | WEIGHT: 136 LBS | SYSTOLIC BLOOD PRESSURE: 129 MMHG | DIASTOLIC BLOOD PRESSURE: 85 MMHG | RESPIRATION RATE: 19 BRPM

## 2020-02-28 DIAGNOSIS — R42 DIZZINESS: ICD-10-CM

## 2020-02-28 DIAGNOSIS — R42 DIZZINESS: Primary | ICD-10-CM

## 2020-02-28 LAB
ALT SERPL-CCNC: 33 U/L (ref 13–56)
ANION GAP SERPL CALC-SCNC: 6 MMOL/L (ref 0–18)
APPEARANCE: CLEAR
AST SERPL-CCNC: 46 U/L (ref 15–37)
BASOPHILS # BLD AUTO: 0.05 X10(3) UL (ref 0–0.2)
BASOPHILS NFR BLD AUTO: 1 %
BILIRUBIN: NEGATIVE
BUN BLD-MCNC: 21 MG/DL (ref 7–18)
BUN/CREAT SERPL: 23.9 (ref 10–20)
CALCIUM BLD-MCNC: 9.2 MG/DL (ref 8.5–10.1)
CHLORIDE SERPL-SCNC: 105 MMOL/L (ref 98–112)
CO2 SERPL-SCNC: 26 MMOL/L (ref 21–32)
CREAT BLD-MCNC: 0.88 MG/DL (ref 0.55–1.02)
DEPRECATED RDW RBC AUTO: 42.5 FL (ref 35.1–46.3)
EOSINOPHIL # BLD AUTO: 0.06 X10(3) UL (ref 0–0.7)
EOSINOPHIL NFR BLD AUTO: 1.2 %
ERYTHROCYTE [DISTWIDTH] IN BLOOD BY AUTOMATED COUNT: 12.7 % (ref 11–15)
EST. AVERAGE GLUCOSE BLD GHB EST-MCNC: 111 MG/DL (ref 68–126)
GLUCOSE (URINE DIPSTICK): NEGATIVE MG/DL
GLUCOSE BLD-MCNC: 85 MG/DL (ref 70–99)
HBA1C MFR BLD HPLC: 5.5 % (ref ?–5.7)
HCT VFR BLD AUTO: 43.4 % (ref 35–48)
HGB BLD-MCNC: 14 G/DL (ref 12–16)
IMM GRANULOCYTES # BLD AUTO: 0 X10(3) UL (ref 0–1)
IMM GRANULOCYTES NFR BLD: 0 %
KETONES (URINE DIPSTICK): NEGATIVE MG/DL
LEUKOCYTES: NEGATIVE
LYMPHOCYTES # BLD AUTO: 2.04 X10(3) UL (ref 1–4)
LYMPHOCYTES NFR BLD AUTO: 40.7 %
MCH RBC QN AUTO: 29.2 PG (ref 26–34)
MCHC RBC AUTO-ENTMCNC: 32.3 G/DL (ref 31–37)
MCV RBC AUTO: 90.6 FL (ref 80–100)
MONOCYTES # BLD AUTO: 0.24 X10(3) UL (ref 0.1–1)
MONOCYTES NFR BLD AUTO: 4.8 %
MULTISTIX LOT#: NORMAL NUMERIC
NEUTROPHILS # BLD AUTO: 2.62 X10 (3) UL (ref 1.5–7.7)
NEUTROPHILS # BLD AUTO: 2.62 X10(3) UL (ref 1.5–7.7)
NEUTROPHILS NFR BLD AUTO: 52.3 %
NITRITE, URINE: NEGATIVE
OSMOLALITY SERPL CALC.SUM OF ELEC: 286 MOSM/KG (ref 275–295)
PATIENT FASTING Y/N/NP: NO
PH, URINE: 6 (ref 4.5–8)
PLATELET # BLD AUTO: 240 10(3)UL (ref 150–450)
POTASSIUM SERPL-SCNC: 4.4 MMOL/L (ref 3.5–5.1)
PROTEIN (URINE DIPSTICK): NEGATIVE MG/DL
RBC # BLD AUTO: 4.79 X10(6)UL (ref 3.8–5.3)
SODIUM SERPL-SCNC: 137 MMOL/L (ref 136–145)
SPECIFIC GRAVITY: 1.02 (ref 1–1.03)
TSI SER-ACNC: 0.65 MIU/ML (ref 0.36–3.74)
URINE-COLOR: YELLOW
UROBILINOGEN,SEMI-QN: 0.2 MG/DL (ref 0–1.9)
WBC # BLD AUTO: 5 X10(3) UL (ref 4–11)

## 2020-02-28 PROCEDURE — 80048 BASIC METABOLIC PNL TOTAL CA: CPT

## 2020-02-28 PROCEDURE — 84460 ALANINE AMINO (ALT) (SGPT): CPT

## 2020-02-28 PROCEDURE — 81002 URINALYSIS NONAUTO W/O SCOPE: CPT | Performed by: PHYSICIAN ASSISTANT

## 2020-02-28 PROCEDURE — 36415 COLL VENOUS BLD VENIPUNCTURE: CPT

## 2020-02-28 PROCEDURE — 84450 TRANSFERASE (AST) (SGOT): CPT

## 2020-02-28 PROCEDURE — 93005 ELECTROCARDIOGRAM TRACING: CPT

## 2020-02-28 PROCEDURE — 84443 ASSAY THYROID STIM HORMONE: CPT

## 2020-02-28 PROCEDURE — 85025 COMPLETE CBC W/AUTO DIFF WBC: CPT

## 2020-02-28 PROCEDURE — 83036 HEMOGLOBIN GLYCOSYLATED A1C: CPT

## 2020-02-28 PROCEDURE — 99213 OFFICE O/P EST LOW 20 MIN: CPT | Performed by: PHYSICIAN ASSISTANT

## 2020-02-28 PROCEDURE — 93010 ELECTROCARDIOGRAM REPORT: CPT | Performed by: PHYSICIAN ASSISTANT

## 2020-02-28 NOTE — PROGRESS NOTES
HPI:     HPI  34year-old female is here in the office complaining of dizziness this morning when she woke up. Patient states that the dizziness occurs intermittently for every couple months.  Patient was seen Cardiologist since she was in high school and w Financial resource strain: Not on file      Food insecurity:        Worry: Not on file        Inability: Not on file      Transportation needs:        Medical: Not on file        Non-medical: Not on file    Tobacco Use      Smoking status: Never Smoker Oral   Weight: 136 lb (61.7 kg)   Height: 5' 5\" (1.651 m)     Body mass index is 22.63 kg/m². Physical Exam:   Physical Exam   Vitals reviewed. Constitutional: She is oriented to person, place, and time. She appears well-developed and well-nourished.

## 2020-06-29 ENCOUNTER — PATIENT MESSAGE (OUTPATIENT)
Dept: FAMILY MEDICINE CLINIC | Facility: CLINIC | Age: 30
End: 2020-06-29

## 2020-06-29 ENCOUNTER — NURSE TRIAGE (OUTPATIENT)
Dept: FAMILY MEDICINE CLINIC | Facility: CLINIC | Age: 30
End: 2020-06-29

## 2020-06-29 NOTE — TELEPHONE ENCOUNTER
From: Eloy Whitaker  To: Bobib Rubio PA-C  Sent: 6/29/2020 1:48 PM CDT  Subject: Non-Urgent Medical Question    Hi, just wanted to ask about some neck/trap pain I've been having since March. I think I sprained it lifting weights overhead.  Terrell Dow

## 2020-06-29 NOTE — TELEPHONE ENCOUNTER
Left message to call back    Patient has read message informing her to call office regarding her symptoms    Last read by Anne Canchola at 3:33 PM on 6/29/2020.

## 2020-06-29 NOTE — TELEPHONE ENCOUNTER
----- Message from [address] [place], [place] [address]Arnoldo Mariscal sent at 6/29/2020  1:48 PM CDT -----  Regarding: Non-Urgent Medical Question  Contact: [phone]  Hi, just wanted to ask about some neck/trap pain I've been having since March.  I think I sprained it lifting weights over

## 2020-06-30 ENCOUNTER — OFFICE VISIT (OUTPATIENT)
Dept: FAMILY MEDICINE CLINIC | Facility: CLINIC | Age: 30
End: 2020-06-30
Payer: COMMERCIAL

## 2020-06-30 ENCOUNTER — HOSPITAL ENCOUNTER (OUTPATIENT)
Dept: GENERAL RADIOLOGY | Age: 30
Discharge: HOME | End: 2020-06-30
Attending: PHYSICIAN ASSISTANT
Payer: COMMERCIAL

## 2020-06-30 VITALS
SYSTOLIC BLOOD PRESSURE: 144 MMHG | HEIGHT: 65 IN | RESPIRATION RATE: 18 BRPM | BODY MASS INDEX: 22.99 KG/M2 | HEART RATE: 69 BPM | DIASTOLIC BLOOD PRESSURE: 92 MMHG | WEIGHT: 138 LBS

## 2020-06-30 DIAGNOSIS — M54.2 CERVICALGIA: ICD-10-CM

## 2020-06-30 DIAGNOSIS — S16.1XXA STRAIN OF NECK MUSCLE, INITIAL ENCOUNTER: ICD-10-CM

## 2020-06-30 DIAGNOSIS — M54.2 CERVICALGIA: Primary | ICD-10-CM

## 2020-06-30 PROCEDURE — 99213 OFFICE O/P EST LOW 20 MIN: CPT | Performed by: PHYSICIAN ASSISTANT

## 2020-06-30 PROCEDURE — 72040 X-RAY EXAM NECK SPINE 2-3 VW: CPT | Performed by: PHYSICIAN ASSISTANT

## 2020-06-30 NOTE — PROGRESS NOTES
HPI:     Neck Pain    This is a new problem. The current episode started more than 1 month ago. The problem occurs intermittently. The problem has been unchanged. The pain is present in the midline. The quality of the pain is described as aching.  Pertinent Occupational History      Not on file    Social Needs      Financial resource strain: Not on file      Food insecurity:        Worry: Not on file        Inability: Not on file      Transportation needs:        Medical: Not on file        Non-medical: Not o Negative for dizziness, tingling, weakness, numbness and headaches. 06/30/20  1421 06/30/20  1437   BP: (!) 147/99 (!) 144/92   Pulse: 69    Resp: 18    Weight: 138 lb (62.6 kg)    Height: 5' 5\" (1.651 m)      Body mass index is 22.96 kg/m².     y

## 2020-06-30 NOTE — TELEPHONE ENCOUNTER
Please reply to pool: EM RN TRIAGE    Action Requested: Summary for Provider     []  Critical Lab, Recommendations Needed  [] Need Additional Advice  []   FYI    []   Need Orders  [] Need Medications Sent to Pharmacy  []  Other     SUMMARY: Office appoin

## 2020-07-01 PROBLEM — S16.1XXA NECK MUSCLE STRAIN: Status: ACTIVE | Noted: 2020-07-01

## 2020-07-06 ENCOUNTER — OFFICE VISIT (OUTPATIENT)
Dept: PHYSICAL THERAPY | Age: 30
End: 2020-07-06
Attending: PHYSICIAN ASSISTANT
Payer: COMMERCIAL

## 2020-07-06 DIAGNOSIS — M54.2 CERVICALGIA: ICD-10-CM

## 2020-07-06 PROCEDURE — 97161 PT EVAL LOW COMPLEX 20 MIN: CPT | Performed by: PHYSICAL THERAPIST

## 2020-07-06 NOTE — PROGRESS NOTES
CERVICAL SPINE EVALUATION:   Referring Physician: Leslie Almeida PA-C    Diagnosis: Cervicalgia (M54.2) Evaluation date: 7/6/2020   Date of Onset: March 2020  Visit # 1  Scheduled Visits 8  Insurance Authorized visits 10 PPO      SUBJECTIVE:   PATIEN pain and N/T  Functional deficits include but are not limited to driving, turning head to drive, sleeping and working out. Pt and PT discussed evaluation findings, pathology, POC and HEP.   Pt voiced understanding and performs HEP correctly without reported Timed Treatment: 5 min     Total Treatment Time: 30 min           PLAN OF CARE:    Goals: To be met in 4-6 weeks  1. Pt to be independent in their home exercise program, its’ progression, and management of their symptoms.   2. Pt to demonstrate and be able

## 2020-07-14 ENCOUNTER — OFFICE VISIT (OUTPATIENT)
Dept: PHYSICAL THERAPY | Age: 30
End: 2020-07-14
Attending: PHYSICIAN ASSISTANT
Payer: COMMERCIAL

## 2020-07-14 PROCEDURE — 97140 MANUAL THERAPY 1/> REGIONS: CPT

## 2020-07-14 PROCEDURE — 97110 THERAPEUTIC EXERCISES: CPT

## 2020-07-14 NOTE — PROGRESS NOTES
Dx:  Cervicalgia (M54.2)         Insurance (Authorized # of Visits): Approved 10 visits   (Ocie Ill PPO)  Authorizing Physician: Dr. Jose Rapp    Next MD visit: none scheduled  Fall Risk: standard           Precautions: None            Subjective: \"I feel bet Timed Treatment: 40 min  Total Treatment Time: 42 min

## 2020-07-23 ENCOUNTER — OFFICE VISIT (OUTPATIENT)
Dept: PHYSICAL THERAPY | Age: 30
End: 2020-07-23
Attending: PHYSICIAN ASSISTANT
Payer: COMMERCIAL

## 2020-07-23 PROCEDURE — 97110 THERAPEUTIC EXERCISES: CPT

## 2020-07-23 NOTE — PROGRESS NOTES
Dx:  Cervicalgia (M54.2)         Insurance (Authorized # of Visits): Approved 10 visits   (Marv BENAVIDEZ)  Authorizing Physician: Dr. Btety Feliz    Next MD visit: none scheduled  Fall Risk: standard           Precautions: None            Subjective: \"I feel bet -Seated posture education with lumbar roll   -Sleeping posture education  with one pillow with towel roll     Manual Therapy: seated manual traction with C-ret 10x10 sec hold (decreased symptoms)   Manual Therapy: Supine  manual traction with C-ret 10x1

## 2020-07-27 ENCOUNTER — OFFICE VISIT (OUTPATIENT)
Dept: PHYSICAL THERAPY | Age: 30
End: 2020-07-27
Attending: PHYSICIAN ASSISTANT
Payer: COMMERCIAL

## 2020-07-27 PROCEDURE — 97110 THERAPEUTIC EXERCISES: CPT | Performed by: PHYSICAL THERAPIST

## 2020-07-27 NOTE — PROGRESS NOTES
Dx:  Cervicalgia (M54.2)         Insurance (Authorized # of Visits): Approved 10 visits   (Jimmytesfaye Muir PPO)  Authorizing Physician: Dr. Racquel Rinaldi    Next MD visit: none scheduled  Fall Risk: standard           Precautions: None            Subjective: Pt reports t x20  -Standing push ups x20  -Prone scap I's and T's(thumbs up/down) x20     -Seated posture education with lumbar roll   -Sleeping posture education  with one pillow with towel roll     Manual Therapy: seated manual traction with C-ret 10x10 sec hold (dec

## 2020-07-30 ENCOUNTER — OFFICE VISIT (OUTPATIENT)
Dept: PHYSICAL THERAPY | Age: 30
End: 2020-07-30
Attending: PHYSICIAN ASSISTANT
Payer: COMMERCIAL

## 2020-07-30 PROCEDURE — 97110 THERAPEUTIC EXERCISES: CPT | Performed by: PHYSICAL THERAPIST

## 2020-07-30 NOTE — PROGRESS NOTES
Dx:  Cervicalgia (M54.2)         Insurance (Authorized # of Visits): Approved 10 visits   (Yessi Porras PPO)  Authorizing Physician: Dr. Shawn Ayala MD visit: none scheduled  Fall Risk: standard           Precautions: None            Subjective: Pt reports t min

## 2020-08-04 ENCOUNTER — OFFICE VISIT (OUTPATIENT)
Dept: PHYSICAL THERAPY | Age: 30
End: 2020-08-04
Attending: PHYSICIAN ASSISTANT
Payer: COMMERCIAL

## 2020-08-04 PROCEDURE — 97110 THERAPEUTIC EXERCISES: CPT | Performed by: PHYSICAL THERAPIST

## 2020-08-04 NOTE — PROGRESS NOTES
Dx:  Cervicalgia (M54.2)         Insurance (Authorized # of Visits): Approved 19 visits   (Kaycee Wilson PPO)  Authorizing Physician: Dr. Cristal Nageotte    Next MD visit: none scheduled  Fall Risk: standard           Precautions: None            Subjective: Pt reports t Progressed   4. Pt to demonstrate [facility] C-spine AROM in order to turn head to drive-In Progressed  5. Pt to increase B UE strength to 5/5 or better in order to put away items in cabinets     Plan: Cont PT per plan of care; pt to schedule more appts.     Charge

## 2020-08-11 ENCOUNTER — NURSE TRIAGE (OUTPATIENT)
Dept: FAMILY MEDICINE CLINIC | Facility: CLINIC | Age: 30
End: 2020-08-11

## 2020-08-11 ENCOUNTER — APPOINTMENT (OUTPATIENT)
Dept: GENERAL RADIOLOGY | Age: 30
End: 2020-08-11
Attending: NURSE PRACTITIONER
Payer: COMMERCIAL

## 2020-08-11 ENCOUNTER — HOSPITAL ENCOUNTER (OUTPATIENT)
Age: 30
Discharge: HOME | End: 2020-08-11
Payer: COMMERCIAL

## 2020-08-11 VITALS
HEART RATE: 68 BPM | RESPIRATION RATE: 18 BRPM | DIASTOLIC BLOOD PRESSURE: 97 MMHG | TEMPERATURE: 98 F | OXYGEN SATURATION: 100 % | SYSTOLIC BLOOD PRESSURE: 150 MMHG

## 2020-08-11 DIAGNOSIS — M26.622 TMJ TENDERNESS, LEFT: ICD-10-CM

## 2020-08-11 DIAGNOSIS — R68.84 JAW PAIN: Primary | ICD-10-CM

## 2020-08-11 PROCEDURE — 99214 OFFICE O/P EST MOD 30 MIN: CPT

## 2020-08-11 PROCEDURE — 70330 X-RAY EXAM OF JAW JOINTS: CPT | Performed by: NURSE PRACTITIONER

## 2020-08-11 PROCEDURE — 99213 OFFICE O/P EST LOW 20 MIN: CPT

## 2020-08-11 RX ORDER — CYCLOBENZAPRINE HCL 10 MG
10 TABLET ORAL 3 TIMES DAILY PRN
Qty: 20 TABLET | Refills: 0 | Status: SHIPPED | OUTPATIENT
Start: 2020-08-11 | End: 2020-08-18

## 2020-08-11 RX ORDER — METHYLPREDNISOLONE 4 MG/1
TABLET ORAL
Qty: 1 PACKAGE | Refills: 0 | Status: SHIPPED | OUTPATIENT
Start: 2020-08-11 | End: 2020-11-27

## 2020-08-11 NOTE — TELEPHONE ENCOUNTER
Action Requested: Summary for Provider     []  Critical Lab, Recommendations Needed  [] Need Additional Advice  []   FYI    []   Need Orders  [] Need Medications Sent to Pharmacy  []  Other     SUMMARY: Patient will be going to ER/IC for eval. Inability to

## 2020-08-11 NOTE — TELEPHONE ENCOUNTER
----- Message from [address] [place], [place] [address]Arnoldo Mariscal sent at 8/11/2020  6:18 AM CDT -----  Regarding: Non-Urgent Medical Question  Contact: [phone]  I've had problems with TMJ, jaw popping out of place a couple years ago.  Since Saturday left side jaw is stuck in closed

## 2020-08-11 NOTE — ED PROVIDER NOTES
Patient presents with:  Jaw Pain      HPI:     Delmis Rizzo is a 34year old female who presents for evaluation and management of a chief complaint of pain to the left TMJ area for the past 2 to 3 days.   She has had this problem in the past.  She Non-medical: Not on file    Tobacco Use      Smoking status: Never Smoker      Smokeless tobacco: Never Used    Substance and Sexual Activity      Alcohol use: Yes        Alcohol/week: 0.0 standard drinks        Comment: very rarely      Drug use:  No pain with palpation other than the left TMJ area. NEURO: No focal deficits.      MDM/Assessment/Plan:   Orders for this encounter:  Orders Placed This Encounter      XR TMJ- TM JOINT BILATERAL  (CPT=70330) Once          Order Specific Question: What is th

## 2020-08-11 NOTE — ED INITIAL ASSESSMENT (HPI)
PATIENT REPORTS SHE BELIEVES SHE HAS TMJ. STATES A FEW YEARS AGO SHE DISLOCATED HER JAW. THIS PAST Friday SHE STATES SHE WOKE UP WITH LEFT JAW TIGHTNESS, ON Saturday TIGHTNESS BECAME MORE PERSISTED. STATES SHE CAN NOT FULLY OPEN HER MOUTH.

## 2020-08-12 ENCOUNTER — OFFICE VISIT (OUTPATIENT)
Dept: PHYSICAL THERAPY | Age: 30
End: 2020-08-12
Attending: INTERNAL MEDICINE
Payer: COMMERCIAL

## 2020-08-12 ENCOUNTER — APPOINTMENT (OUTPATIENT)
Dept: PHYSICAL THERAPY | Age: 30
End: 2020-08-12
Attending: PHYSICIAN ASSISTANT
Payer: COMMERCIAL

## 2020-08-12 NOTE — PROGRESS NOTES
Patient arrived on time for PT treatment, however patient went to [address] [place] [address] yesterday for left side TMJ pain. Patient c/o left side jaw joint and the surrounding muscle and left UT and neck pain.  Per patient IC order muscle relaxer and steroid, I start last night

## 2020-08-14 ENCOUNTER — OFFICE VISIT (OUTPATIENT)
Dept: PHYSICAL THERAPY | Age: 30
End: 2020-08-14
Attending: PHYSICIAN ASSISTANT
Payer: COMMERCIAL

## 2020-08-14 PROCEDURE — 97110 THERAPEUTIC EXERCISES: CPT | Performed by: PHYSICAL THERAPIST

## 2020-08-14 NOTE — PROGRESS NOTES
Dx:  Cervicalgia (M54.2)         Insurance (Authorized # of Visits): Approved 19 visits   (Epi Sullivan PPO)  Authorizing Physician: Dr. Ravi Victoria    Next MD visit: none scheduled  Fall Risk: standard           Precautions: None            Subjective: Pt reports t OP    Goals: To be met in 4-6 weeks  1. Pt to be independent in their home exercise program, its’ progression, and management of their symptoms.-In Progressed  2.  Pt to demonstrate and be able to verbalize strategies to aid static postural control so pt ca

## 2020-08-20 ENCOUNTER — OFFICE VISIT (OUTPATIENT)
Dept: PHYSICAL THERAPY | Age: 30
End: 2020-08-20
Attending: PHYSICIAN ASSISTANT
Payer: COMMERCIAL

## 2020-08-20 PROCEDURE — 97110 THERAPEUTIC EXERCISES: CPT

## 2020-08-20 NOTE — PROGRESS NOTES
Dx:  Cervicalgia (M54.2)         Insurance (Authorized # of Visits): Approved 19 visits   [facility]  Authorizing Physician: Dr. Neftaly Tabares    Next MD visit: none scheduled  Fall Risk: standard           Precautions: None            Subjective: Patientt rep with 2#  -YTB hor abd 2x10  -YTB B ER x20   -Seated C-ret x10  -Seated C-ret with ext x10  -Seated C-ret with OP x10  -Seated c-ret with ext and c-rot x15  -Seated thoracic ext with ball x10  -Supine serratus punch with 5# x20  -Supine C-ret with ext EOB w

## 2020-08-24 ENCOUNTER — OFFICE VISIT (OUTPATIENT)
Dept: PHYSICAL THERAPY | Age: 30
End: 2020-08-24
Attending: PHYSICIAN ASSISTANT
Payer: COMMERCIAL

## 2020-08-24 PROCEDURE — 97110 THERAPEUTIC EXERCISES: CPT

## 2020-08-24 PROCEDURE — 97140 MANUAL THERAPY 1/> REGIONS: CPT

## 2020-08-24 NOTE — PROGRESS NOTES
Dx:  Cervicalgia (M54.2)         Insurance (Authorized # of Visits): Approved 19 visits   (Violetta Garcia PPO)  Authorizing Physician: Dr. Fiona Ayala MD visit: none scheduled  Fall Risk: standard           Precautions: None            Subjective: Patientt rep 2#  -YTB hor abd 2x10  -YTB B ER x20   -Seated C-ret x10  -Seated C-ret with ext x10  -Seated C-ret with OP x10  -Seated c-ret with ext and c-rot x15  -Seated thoracic ext with ball x10  -Supine serratus punch with 5# x20  -Supine C-ret with ext EOB with r order to turn head to drive-In Progressed  5. Pt to increase B UE strength to 5/5 or better in order to put away items in cabinets     Plan: Continued to C-spine extension based exercises, stretching  and scapular stabilization.      Charges: Ex x2, MM x1

## 2020-08-26 ENCOUNTER — OFFICE VISIT (OUTPATIENT)
Dept: PHYSICAL THERAPY | Age: 30
End: 2020-08-26
Attending: PHYSICIAN ASSISTANT
Payer: COMMERCIAL

## 2020-08-26 PROCEDURE — 97140 MANUAL THERAPY 1/> REGIONS: CPT | Performed by: PHYSICAL THERAPIST

## 2020-08-26 PROCEDURE — 97110 THERAPEUTIC EXERCISES: CPT | Performed by: PHYSICAL THERAPIST

## 2020-08-26 NOTE — PROGRESS NOTES
Dx:  Cervicalgia (M54.2)         Insurance (Authorized # of Visits): Approved 19 visits   ([facility])  Authorizing Physician: Dr. Cristal Nageotte    Next MD visit: none scheduled  Fall Risk: standard           Precautions: None            Subjective: Patient that Ex x2, MM x1     Total Timed Treatment: 42 min  Total Treatment Time: 45 min

## 2020-09-03 ENCOUNTER — OFFICE VISIT (OUTPATIENT)
Dept: PHYSICAL THERAPY | Age: 30
End: 2020-09-03
Attending: FAMILY MEDICINE
Payer: COMMERCIAL

## 2020-09-03 PROCEDURE — 97140 MANUAL THERAPY 1/> REGIONS: CPT

## 2020-09-03 PROCEDURE — 97110 THERAPEUTIC EXERCISES: CPT

## 2020-09-03 NOTE — PROGRESS NOTES
Dx:  Cervicalgia (M54.2)         Insurance (Authorized # of Visits): Approved 19 visits   (Sinan [facility]y [facility])  Authorizing Physician: Dr. Vickie Ayala MD visit: none scheduled  Fall Risk: standard           Precautions: None            Subjective: Patient that Progressed  2. Pt to demonstrate and be able to verbalize strategies to aid static postural control so pt can drive > 20 minutes with < 2/10 pain. -In Progressed  3. Pt to report ability to sleep >5 hours for 3 consecutive nights with reported pain 0/10.-In

## 2020-09-09 ENCOUNTER — OFFICE VISIT (OUTPATIENT)
Dept: PHYSICAL THERAPY | Age: 30
End: 2020-09-09
Attending: FAMILY MEDICINE
Payer: COMMERCIAL

## 2020-09-09 PROCEDURE — 97110 THERAPEUTIC EXERCISES: CPT

## 2020-09-09 NOTE — PROGRESS NOTES
Dx:  Cervicalgia (M54.2)         Insurance (Authorized # of Visits): Approved 19 visits   (Humaira Mueller PPO)  Authorizing Physician: Dr. Andria Brooks    Next MD visit: none scheduled  Fall Risk: standard           Precautions: None            Subjective: Patient repo to traps x 10 min Manual: STM to traps x 10 min   Manual:  \            HEP: 7/14: 8/4/20 C-spine retraction, ext, rot x10 reps 3-4 times per day (verbal)   Towel C-ret, supine C-ret with towel  7/23: Verbally C-ret with OP  -8/24/20: RHOMBOID AND MIDDLE T

## 2020-09-11 ENCOUNTER — PATIENT MESSAGE (OUTPATIENT)
Dept: FAMILY MEDICINE CLINIC | Facility: CLINIC | Age: 30
End: 2020-09-11

## 2020-09-11 DIAGNOSIS — M26.622 ARTHRALGIA OF LEFT TEMPOROMANDIBULAR JOINT: Primary | ICD-10-CM

## 2020-09-12 NOTE — TELEPHONE ENCOUNTER
Pastor Raul Fontanez=see below and advise. This nurse did not pend the medication due to different choices of doses.         From: Merian Hodgkin  To: JAM Merida  Sent: 9/11/2020  5:57 PM CDT  Subject: Prescription Question    Hello, I am g

## 2020-09-12 NOTE — TELEPHONE ENCOUNTER
From: [facility]  To: Edwardo Ortiz PA-C  Sent: 9/11/2020 5:53 PM CDT  Subject: Visit Follow-up Question    This is a follow up question from my previous visit about neck pain.  I was referred to have PT, which I have been attending and has h

## 2020-09-13 RX ORDER — ACETAZOLAMIDE 125 MG/1
125 TABLET ORAL 2 TIMES DAILY
Qty: 6 TABLET | Refills: 0 | Status: SHIPPED | OUTPATIENT
Start: 2020-09-13 | End: 2020-09-16

## 2020-09-14 ENCOUNTER — OFFICE VISIT (OUTPATIENT)
Dept: PHYSICAL THERAPY | Age: 30
End: 2020-09-14
Attending: FAMILY MEDICINE
Payer: COMMERCIAL

## 2020-09-14 PROCEDURE — 97110 THERAPEUTIC EXERCISES: CPT

## 2020-09-14 NOTE — PROGRESS NOTES
Dx:  Cervicalgia (M54.2)         Insurance (Authorized # of Visits): Approved 19 visits   (Bridgette BENAVIDEZ)  Authorizing Physician: Dr. Betty Shen MD visit: none scheduled  Fall Risk: standard           Precautions: None            Subjective: Patient repo symptoms)    -Prone ext with scap squeeze x10  -Prone T with thum up/down x15 ea  -Prone W, Y x10 ea   There Ex:  -SciFit UE L1 3F/3B  -Black TB high/low rows x20  -Black TB ext x20  -Black TB fwd punch x20  -Wall push up x15  -Wall slide with YTB x15  -Wa

## 2020-09-17 ENCOUNTER — OFFICE VISIT (OUTPATIENT)
Dept: PHYSICAL THERAPY | Age: 30
End: 2020-09-17
Attending: FAMILY MEDICINE
Payer: COMMERCIAL

## 2020-09-17 PROCEDURE — 97110 THERAPEUTIC EXERCISES: CPT

## 2020-09-17 NOTE — PROGRESS NOTES
Dx:  Cervicalgia (M54.2)         Insurance (Authorized # of Visits): Approved 19 visits   (Raymond Pink PPO)  Authorizing Physician: Dr. Izzy Welch    Next MD visit: none scheduled  Fall Risk: standard           Precautions: None            Subjective: Patient repo 3F/3B  -Black TB high/low rows x20  -Black TB ext x20  -Black TB fwd punch x20  -Wall push up x15  -Wall slide with YTB x15  -Wall lat walk with YTB 15ft x2    Seated scap squeeze 15 x5 sec hold  -Seated thoracic ext x10  -Seated C-ret x10  -Seated C-ret w based exercises, scapular strengthening/stabilization and posture education.      Charges: Ex x 3     Total Timed Treatment: 42 min  Total Treatment Time: 45 min

## 2020-09-23 ENCOUNTER — OFFICE VISIT (OUTPATIENT)
Dept: PHYSICAL THERAPY | Age: 30
End: 2020-09-23
Attending: FAMILY MEDICINE
Payer: COMMERCIAL

## 2020-09-23 PROCEDURE — 97110 THERAPEUTIC EXERCISES: CPT | Performed by: PHYSICAL THERAPIST

## 2020-09-23 NOTE — PROGRESS NOTES
Binh  Pt has attended 15 visits in Physical Therapy.        Dx:  Cervicalgia (M54.2)         Insurance (Authorized # of Visits): Approved 19 visits   (Thaddeus Lau PPO)  Authorizing Physician: Dr. Yamil Ayala MD visit: none scheduled  Fall Risk: x20  ea  Low row/ext with 1.5 kg x20 ea  Hori abd with 1.5 kig x20         There Ex:  -SciFit UE L1 3F/3B  -Seated scap squeeze 20 x5 sec hold    -Seated C-ret x10   -Seated C-ret with OP x10  -Seated C-ret with ext x10    -Wall slide x15 (c/o tired and fa

## 2020-09-24 ENCOUNTER — APPOINTMENT (OUTPATIENT)
Dept: PHYSICAL THERAPY | Age: 30
End: 2020-09-24
Attending: FAMILY MEDICINE
Payer: COMMERCIAL

## 2020-10-02 ENCOUNTER — OFFICE VISIT (OUTPATIENT)
Dept: PHYSICAL THERAPY | Age: 30
End: 2020-10-02
Attending: FAMILY MEDICINE
Payer: COMMERCIAL

## 2020-10-02 DIAGNOSIS — M26.622 ARTHRALGIA OF LEFT TEMPOROMANDIBULAR JOINT: ICD-10-CM

## 2020-10-02 PROCEDURE — 97162 PT EVAL MOD COMPLEX 30 MIN: CPT

## 2020-10-02 PROCEDURE — 97110 THERAPEUTIC EXERCISES: CPT

## 2020-10-02 NOTE — PROGRESS NOTES
TMJ EVALUATION:   Referring Physician: Dr. Marta Zuniga  Diagnosis: Arthralgia of left temporomandibular joint (S22.493)  Date of Onset: 8/8/20 Date of Service: 10/2/2020     PATIENT SUMMARY   Luigi Blandon is a 34year old y/o female who presents to ASSESSMENT:    Burgess Hahn presents to physical therapy evaluation with primary c/o  pain on L jaw and not able to open as wide as  Normal. pt states that there 2-3 episodes that she had issues with jaw locking  . Alayna Reynolds presented to therap moderately to severely  Restricted on the R, pain upon palpation.   Scapula stabilizers strength are not tested but she reports HEP from previous PT. BUE AROM are WFL, painfree with [facility] strength       Precautions:  None  OBJECTIVE:     Observation/Posture/f palpation on noted tight masticatory/paracervical  muscles above to minimal to no restriction. 3,Pt will report decreased in  frequency/intensity of  pain and symptoms to 50% or better in order to eat normally   4.   Pt will demonstrate improved mandibular prevents me from talking at all    SECTION 2 :Normal Living Activities ( brushing teeth and flossing) 1    0-I am able to care for my teeth and gums in a N fashion w/o restriction/pain and discomfort  1- I am able to care for all my teeth and gums but I mu -specialized jaw activities ( yawning. mouth opening and opening my mouth wide) 3    0- I can yawn in a N fashion,painlessly  1- I can yawn and open my mouth fully wide open but sometimes there is discomfort  2; I can yawn and open my mouth wide in N fashio completely control my pain with some form of treatment  2-I get partial but significant relief through some form of treatment  3- I don't get a lot of relief from any form of treatment  4- there is no form of treatment that helps enough to make me want to care.    X___________________________________________________ Date____________________    Certification From: 84/6/9362  To:12/31/2020 No - the patient is unable to be screened due to medical condition

## 2020-10-06 ENCOUNTER — OFFICE VISIT (OUTPATIENT)
Dept: PHYSICAL THERAPY | Age: 30
End: 2020-10-06
Attending: FAMILY MEDICINE
Payer: COMMERCIAL

## 2020-10-06 PROCEDURE — 97140 MANUAL THERAPY 1/> REGIONS: CPT

## 2020-10-06 PROCEDURE — 97110 THERAPEUTIC EXERCISES: CPT

## 2020-10-06 NOTE — PROGRESS NOTES
Dx: Arthralgia of left temporomandibular joint      Insurance   ppo         POC# of visits: 8          Authorized  # visits by insurance  : 8   Expiration date :  Authorizing Physician: Dr. Marta Zuniga Next MD visit: NA  Fall Risk: standard         Precautions: x6 mins  Level1-4 TMJ rotation  Translation x3-5 each  UT stretches 30 x3  Supine level TMJ level1-4 in supine x5 each  Supine R/L  Lateral deviation  Standing TMJ depression check x5  Theraex: Theraex: Theraex: Theraex:   Manual:seated UT/scalenes and med

## 2020-10-14 ENCOUNTER — OFFICE VISIT (OUTPATIENT)
Dept: PHYSICAL THERAPY | Age: 30
End: 2020-10-14
Attending: FAMILY MEDICINE
Payer: COMMERCIAL

## 2020-10-14 PROCEDURE — 97140 MANUAL THERAPY 1/> REGIONS: CPT

## 2020-10-14 PROCEDURE — 97110 THERAPEUTIC EXERCISES: CPT

## 2020-10-14 NOTE — PROGRESS NOTES
Dx: Arthralgia of left temporomandibular joint      Insurance   ppo         POC# of visits: 8          Authorized  # visits by insurance  : 8   Expiration date :  Authorizing Physician: Dr. Radha Ayala MD visit: NA  Fall Risk: standard         Precautions: level TMJ level1-4 in supine x5 each  Supine R/L  Lateral deviation  Standing TMJ depression check x5  Theraex:  UBE level 1 x6 mins    level4 rotation translation ex: seated and supine x5 each  Lateral deviation 2x10 Theraex: Theraex: Theraex:   Manual:se

## 2020-10-16 ENCOUNTER — OFFICE VISIT (OUTPATIENT)
Dept: PHYSICAL THERAPY | Age: 30
End: 2020-10-16
Attending: FAMILY MEDICINE
Payer: COMMERCIAL

## 2020-10-16 PROCEDURE — 97140 MANUAL THERAPY 1/> REGIONS: CPT

## 2020-10-16 PROCEDURE — 97110 THERAPEUTIC EXERCISES: CPT

## 2020-10-16 NOTE — PROGRESS NOTES
Dx: Arthralgia of left temporomandibular joint      Insurance   ppo         POC# of visits: 8          Authorized  # visits by insurance  : 8   Expiration date :  Authorizing Physician: Dr. Phu Pruitt Next MD visit: NA  Fall Risk: standard         Precautions: stretches 30 x3  Supine level TMJ level1-4 in supine x5 each  Supine R/L  Lateral deviation  Standing TMJ depression check x5  Theraex:  UBE level 1 x6 mins    level4 rotation translation ex: seated and supine x5 each  Lateral deviation 2x10 Theraex:  UBE

## 2020-10-19 ENCOUNTER — OFFICE VISIT (OUTPATIENT)
Dept: PHYSICAL THERAPY | Age: 30
End: 2020-10-19
Attending: FAMILY MEDICINE
Payer: COMMERCIAL

## 2020-10-19 PROCEDURE — 97140 MANUAL THERAPY 1/> REGIONS: CPT

## 2020-10-19 PROCEDURE — 97110 THERAPEUTIC EXERCISES: CPT

## 2020-10-19 NOTE — PROGRESS NOTES
Dx: Arthralgia of left temporomandibular joint      Insurance   ppo         POC# of visits: 8          Authorized  # visits by insurance  : 8   Expiration date :  Authorizing Physician: Dr. Hortencia Rodríguez Next MD visit: NA  Fall Risk: standard         Precautions: 10/6/2020  TX#: 2/8 Date:     10/14/20          TX#: 3/8 Date:     10/16/20         TX#: 4/8 Date:10/19/20               TX#: 5/8  FOTO Date:    Tx#: 6/   Theraex: UBE level 1 x6 mins  Level1-4 TMJ rotation  Translation x3-5 each  UT stretches 30 x3  Supine return demo Others: Others:  Others:     HEP: ( see pt instructions ) level 1-4 with emhpasis to move only to next level is better  10/14/20: intra oral techniques    Charges: man gage x2, theraex 1       Total Timed Treatment: 40 min  Total Treatment Time:

## 2020-10-28 ENCOUNTER — OFFICE VISIT (OUTPATIENT)
Dept: PHYSICAL THERAPY | Age: 30
End: 2020-10-28
Attending: FAMILY MEDICINE
Payer: COMMERCIAL

## 2020-10-28 PROCEDURE — 97140 MANUAL THERAPY 1/> REGIONS: CPT

## 2020-10-28 PROCEDURE — 97110 THERAPEUTIC EXERCISES: CPT

## 2020-10-28 NOTE — PROGRESS NOTES
Dx: Arthralgia of left temporomandibular joint      Insurance   ppo         POC# of visits: 8          Authorized  # visits by insurance  : 8   Expiration date :  Authorizing Physician: Dr. Sara Cruz Next MD visit: NA  Fall Risk: standard         Precautions: History : Y       Can bring back teeth in contact            R L   Upper trapezius Mod restricted mod restricted   Levator Scap moderately restricted moderately restricted   Scalenes/SCM minimally restricted Moderately restricted   Hyoids WNL WFL   Masseter x10 reps.  Pt with pain moving towards the L side  Supine cervical retraction 2x10 in supine and seated x10 x2 the cervical extension x5 x2    Manual:seated UT/scalenes and med scapula R/L  Supine paracervical, L SC,masticatory mm with emphasis on temporali

## 2020-10-30 ENCOUNTER — LAB ENCOUNTER (OUTPATIENT)
Dept: LAB | Age: 30
End: 2020-10-30
Attending: PREVENTIVE MEDICINE

## 2020-10-30 ENCOUNTER — TELEPHONE (OUTPATIENT)
Dept: INTERNAL MEDICINE CLINIC | Facility: HOSPITAL | Age: 30
End: 2020-10-30

## 2020-10-30 DIAGNOSIS — Z20.822 SUSPECTED COVID-19 VIRUS INFECTION: Primary | ICD-10-CM

## 2020-10-30 DIAGNOSIS — Z20.822 SUSPECTED COVID-19 VIRUS INFECTION: ICD-10-CM

## 2020-11-04 ENCOUNTER — OFFICE VISIT (OUTPATIENT)
Dept: PHYSICAL THERAPY | Age: 30
End: 2020-11-04
Attending: FAMILY MEDICINE
Payer: COMMERCIAL

## 2020-11-04 PROCEDURE — 97110 THERAPEUTIC EXERCISES: CPT

## 2020-11-04 PROCEDURE — 97140 MANUAL THERAPY 1/> REGIONS: CPT

## 2020-11-04 NOTE — PROGRESS NOTES
Dx: Arthralgia of left temporomandibular joint      Insurance   ppo         POC# of visits: 8          Authorized  # visits by insurance  : 8   Expiration date :  Authorizing Physician: Dr. Sara Cruz Next MD visit: NA  Fall Risk: standard         Precautions: of my teeth and gums because of restricted opening and or pain    SECTION 3: Normal Living Activities ( Eating/Chewing) 1    0-I can eat and chew as much of anything I want without pain/discomfort or jaw tired ness  1- I can eat and chew most anything I wa accustomed):1    0- I am able to engage in all my customary sexual activities and expressions w/o limitations and/or causing HA, face or jaw pain  1- I am able to  engage in all my customary sexual activities and expressions,but it sometimes causes some HA acceptable amount of sleep  3- I experience ringing in my ear(s) and it causes marked impairment in the performance of daily activities and unacceptable loss of sleep  4- I experience ringing in my ear(s) and it is incapacitating and forces me to use [facility] frequency/intensity of  pain and symptoms to 50% or better in order to eat normally :40%  4.   Pt will demonstrate improved mandibular dynamics with improved AROM to [facility] in all motions at least painfree 40mm on jaw depression,painfree lateral excursion and med/inferior  Ligamentum nuchae and C7/T1 junction  Intraoral with pt and PT external OP Manual:  Supine B UT, posterior cervical and SCM  Supine suboccipitals  Supine masticatory mm with emphasis on temporalis and masseter B then L side emphasis  Medial g

## 2020-11-07 ENCOUNTER — TELEMEDICINE (OUTPATIENT)
Dept: FAMILY MEDICINE CLINIC | Facility: CLINIC | Age: 30
End: 2020-11-07
Payer: COMMERCIAL

## 2020-11-07 DIAGNOSIS — Z20.822 CLOSE EXPOSURE TO 2019 NOVEL CORONAVIRUS: Primary | ICD-10-CM

## 2020-11-07 PROCEDURE — 99213 OFFICE O/P EST LOW 20 MIN: CPT | Performed by: PHYSICIAN ASSISTANT

## 2020-11-07 NOTE — PROGRESS NOTES
HPI: HPI    I spoke Jesenia Nielsen by telephone , verified date of birth, and discussed current concerns. Onset/duration of current symptoms: 3 days.     Common COVID-19 symptoms per CDC: CDC Clinical Guidance  • Fever:     Yes [x]     No [] Social History:   Social History    Socioeconomic History      Marital status: Single      Spouse name: Not on file      Number of children: Not on file      Years of education: Not on file      Highest education level: Not on file    Occupational [employer] Gastrointestinal: Negative for nausea, vomiting, abdominal pain, diarrhea, constipation, blood in stool and abdominal distention. Skin: Negative for rash. Neurological: Positive for headaches. Negative for dizziness, weakness and numbness.        Ther

## 2020-11-09 ENCOUNTER — APPOINTMENT (OUTPATIENT)
Dept: LAB | Age: 30
End: 2020-11-09
Attending: PHYSICIAN ASSISTANT
Payer: COMMERCIAL

## 2020-11-09 DIAGNOSIS — Z20.822 CLOSE EXPOSURE TO 2019 NOVEL CORONAVIRUS: ICD-10-CM

## 2020-11-12 ENCOUNTER — OFFICE VISIT (OUTPATIENT)
Dept: PHYSICAL THERAPY | Age: 30
End: 2020-11-12
Attending: FAMILY MEDICINE
Payer: COMMERCIAL

## 2020-11-12 PROCEDURE — 97140 MANUAL THERAPY 1/> REGIONS: CPT

## 2020-11-12 PROCEDURE — 97110 THERAPEUTIC EXERCISES: CPT

## 2020-11-12 NOTE — PROGRESS NOTES
Binh  Pt has attended 8 visits in Physical Therapy   Dx: Arthralgia of left temporomandibular joint      Insurance   ppo         POC# of visits: 8          Authorized  # visits by insurance  : 8  Authorizing Physician: Dr. Betty Bee correctly without reported pain. Instructions were provided on how to modify as need or when to stop. Larry Allen now with decreased tenderness/hypertonicity on paracervicals and masticatory muscles  . Noted to have improved TMDI score  22% to16 % improvement on improved AROM to [facility] in all motions at least painfree 40mm on jaw depression,painfree lateral excursion and protrusion:improved to 38 mm       Date: 11/4/20  TX#: 7/8 Date:     11/12/20          TX#: 8/8 Date:     10/16/20         TX#: 4/8 Date:10/19/20 pt instructions ) level 1-4 with emphasis to move only to next level is better  10/14/20: intra oral techniques    Charges: man gage x1, theraex 2       Total Timed Treatment: 40 min  Total Treatment Time: 40 min  TMDI:   SECTION 1:Communication :1    0-I c participate in N social life and/or recreational activities but pain/discomfort is increased  2- The presence of pain and or fear of likely aggravation only limits the more energetic components of my social life( sports, exercising,dancing,playing musical use of pain pills,anti-inflammatory meds or sleeping aides  2-I fail to realize 6 hrs of restful sleep even with the use of pills  3-I fail to realize 4 hrs of restful sleep even with the use of pills  4-I fail to realize 2 hrs of restful sleep even with t and treatment options and has agreed to actively participate in planning and for this course of care. Thank you for your referral. If you have any questions, please contact me at Dept: [phone].     Sincerely,  Electronically signed by therapist: Kuldip Salazar

## 2020-11-27 ENCOUNTER — OFFICE VISIT (OUTPATIENT)
Dept: FAMILY MEDICINE CLINIC | Facility: CLINIC | Age: 30
End: 2020-11-27
Payer: COMMERCIAL

## 2020-11-27 VITALS
HEART RATE: 61 BPM | BODY MASS INDEX: 24.32 KG/M2 | WEIGHT: 146 LBS | HEIGHT: 65 IN | SYSTOLIC BLOOD PRESSURE: 132 MMHG | DIASTOLIC BLOOD PRESSURE: 82 MMHG

## 2020-11-27 DIAGNOSIS — Z01.419 WELL WOMAN EXAM WITH ROUTINE GYNECOLOGICAL EXAM: Primary | ICD-10-CM

## 2020-11-27 DIAGNOSIS — N63.20 LEFT BREAST MASS: ICD-10-CM

## 2020-11-27 PROCEDURE — 3079F DIAST BP 80-89 MM HG: CPT | Performed by: NURSE PRACTITIONER

## 2020-11-27 PROCEDURE — 99395 PREV VISIT EST AGE 18-39: CPT | Performed by: NURSE PRACTITIONER

## 2020-11-27 PROCEDURE — 3075F SYST BP GE 130 - 139MM HG: CPT | Performed by: NURSE PRACTITIONER

## 2020-11-27 PROCEDURE — 3008F BODY MASS INDEX DOCD: CPT | Performed by: NURSE PRACTITIONER

## 2020-11-27 PROCEDURE — 99072 ADDL SUPL MATRL&STAF TM PHE: CPT | Performed by: NURSE PRACTITIONER

## 2020-11-27 NOTE — PROGRESS NOTES
HPI  Pt here for physical and pap. Eats well and exercises. fmh: htn, gr aunt breast cancer    On ocp-no problems periods regular  In monogamous relationship        Has left lower breast differnece. Had 2 small masses.      Had biometric screeni Grandfather    • Heart Disorder Maternal Grandfather    • Other (Other) Paternal Grandmother    • Other (Other) Paternal Grandfather        Social History    Socioeconomic History      Marital status: Single      Spouse name: Not on file      Number of chi without issue    Physical Exam   Nursing note and vitals reviewed. Constitutional: She is oriented to person, place, and time. She appears well-developed and well-nourished. No distress. HENT:   Head: Normocephalic and atraumatic.    Right Ear: Tympanic and fast foods. Try to get at least 150 minutes of exercise per week-a combination of weight resistance and cardio is preferred. Had flu shot.           Relevant Orders    GENITAL VAGINOSIS SCREEN (Completed)    THINPREP PAP SMEAR B    HPV HIGH RISK , T

## 2020-11-28 NOTE — ASSESSMENT & PLAN NOTE
Pap w hpv. Vag c/s  Had biometric screening done-will place results on mychart  Please aim to eat a diet high in fresh fruits and vegetables, lean protein sources, complex carbohydrates and limited processed and fast foods.   Try to get at least 150 minutes

## 2020-11-29 DIAGNOSIS — Z30.41 ENCOUNTER FOR SURVEILLANCE OF CONTRACEPTIVE PILLS: ICD-10-CM

## 2020-11-30 RX ORDER — NORGESTIMATE AND ETHINYL ESTRADIOL
KIT
Qty: 84 TABLET | Refills: 3 | Status: SHIPPED | OUTPATIENT
Start: 2020-11-30 | End: 2021-10-26

## 2020-12-01 ENCOUNTER — TELEPHONE (OUTPATIENT)
Dept: INTERNAL MEDICINE UNIT | Facility: HOSPITAL | Age: 30
End: 2020-12-01

## 2020-12-01 DIAGNOSIS — Z20.822 SUSPECTED COVID-19 VIRUS INFECTION: Primary | ICD-10-CM

## 2020-12-12 ENCOUNTER — LAB ENCOUNTER (OUTPATIENT)
Dept: LAB | Age: 30
End: 2020-12-12
Attending: NURSE PRACTITIONER
Payer: COMMERCIAL

## 2020-12-12 DIAGNOSIS — Z20.822 SUSPECTED COVID-19 VIRUS INFECTION: ICD-10-CM

## 2020-12-12 PROCEDURE — 36415 COLL VENOUS BLD VENIPUNCTURE: CPT

## 2020-12-12 PROCEDURE — 86769 SARS-COV-2 COVID-19 ANTIBODY: CPT

## 2020-12-30 ENCOUNTER — HOSPITAL ENCOUNTER (OUTPATIENT)
Dept: MAMMOGRAPHY | Facility: HOSPITAL | Age: 30
Discharge: HOME | End: 2020-12-30
Attending: NURSE PRACTITIONER
Payer: COMMERCIAL

## 2020-12-30 ENCOUNTER — HOSPITAL ENCOUNTER (OUTPATIENT)
Dept: ULTRASOUND IMAGING | Facility: HOSPITAL | Age: 30
Discharge: HOME | End: 2020-12-30
Attending: NURSE PRACTITIONER
Payer: COMMERCIAL

## 2020-12-30 DIAGNOSIS — N63.20 LEFT BREAST MASS: ICD-10-CM

## 2020-12-30 PROCEDURE — 76642 ULTRASOUND BREAST LIMITED: CPT | Performed by: NURSE PRACTITIONER

## 2020-12-30 PROCEDURE — 77062 BREAST TOMOSYNTHESIS BI: CPT | Performed by: NURSE PRACTITIONER

## 2020-12-30 PROCEDURE — 77066 DX MAMMO INCL CAD BI: CPT | Performed by: NURSE PRACTITIONER

## 2020-12-30 NOTE — IMAGING NOTE
This nurse introduced self and role of breast coordinator. Discussed recommended breast biopsy with patient. Pt was recommended by Dr Gm Jean  to have a  left breast x 2 sites ultrasound guided biopsy.  Sophie Ham is a nurse at [facility] .  Hx fibroadenoma. Ultrasound-guided biopsy is recommended for complete evaluation.      Evaluation of the left axilla demonstrates morphologically normal lymph nodes.        =====  CONCLUSION:  Ultrasound-guided biopsies of the two palpable masses in the lef to hold these medications for 5  days prior to biopsy. She denies usage . Reviewed US guided biopsy procedure, as below. You will be lying on your back, potentially slightly toward one side, for this procedure.   The US technician will use the ultra the biopsy and decrease swelling. Reviewed results process with patient and shared that pathology results will be available within 2-3 business days of their biopsy.   Discussed results will be communicated by their ordering physician unless otherwise alice

## 2021-01-05 ENCOUNTER — TELEPHONE (OUTPATIENT)
Dept: ULTRASOUND IMAGING | Facility: HOSPITAL | Age: 31
End: 2021-01-05

## 2021-01-05 DIAGNOSIS — N63.20 LEFT BREAST MASS: Primary | ICD-10-CM

## 2021-01-05 NOTE — TELEPHONE ENCOUNTER
NO order received for bx despite faxed request.  This rn  called office. The  service to carolyn Tucker  regarding bx orders needed  .

## 2021-01-13 ENCOUNTER — HOSPITAL ENCOUNTER (OUTPATIENT)
Dept: MAMMOGRAPHY | Facility: HOSPITAL | Age: 31
Discharge: HOME | End: 2021-01-13
Attending: NURSE PRACTITIONER
Payer: COMMERCIAL

## 2021-01-13 VITALS — SYSTOLIC BLOOD PRESSURE: 152 MMHG | DIASTOLIC BLOOD PRESSURE: 102 MMHG | HEART RATE: 74 BPM | RESPIRATION RATE: 16 BRPM

## 2021-01-13 DIAGNOSIS — N63.20 LEFT BREAST MASS: ICD-10-CM

## 2021-01-13 PROCEDURE — 77065 DX MAMMO INCL CAD UNI: CPT | Performed by: NURSE PRACTITIONER

## 2021-01-13 PROCEDURE — 19083 BX BREAST 1ST LESION US IMAG: CPT | Performed by: NURSE PRACTITIONER

## 2021-01-13 PROCEDURE — 88305 TISSUE EXAM BY PATHOLOGIST: CPT | Performed by: NURSE PRACTITIONER

## 2021-01-13 PROCEDURE — 19084 BX BREAST ADD LESION US IMAG: CPT | Performed by: NURSE PRACTITIONER

## 2021-01-13 NOTE — PROCEDURES
[facility]  Procedure Note    [facility] Patient Status:  Outpatient    10/24/1990 MRN [id number]   Location [address] [facility] [place] Attending JAM Pitt   Hosp Day # 0 PCP Reina Le PA-C     P

## 2021-01-13 NOTE — IMAGING NOTE
800 am Identified with spelling of name and date of birth. Medications and allergies reviewed. Denies taking aspirin containing medications, NSAIDs, fish oil, vitamin E  5 days prior to biopsy.  Denies prescription anti coagulating medications 5 days pr

## 2021-01-14 ENCOUNTER — TELEPHONE (OUTPATIENT)
Dept: ULTRASOUND IMAGING | Facility: HOSPITAL | Age: 31
End: 2021-01-14

## 2021-01-14 NOTE — IMAGING NOTE
Ramos Fonseca is s/p biopsy . Phoned at [phone] no answer Recalled [phone] 1/15/21  and introduced myself as breast coordinator . Reinforced to patient  post biopsy care and instructions .     Informed  and shared the pathology results as well as the physician immediately  for re evaluation. Maria Ines Wan Tecsonverbalizes understanding and agreement.

## 2021-01-14 NOTE — TELEPHONE ENCOUNTER
Attempted to reach [facility] post procedure to ensure no c/o post procedure and that she has received her results;ts and recommendations form her physician.  No answer

## 2021-03-30 ENCOUNTER — LAB ENCOUNTER (OUTPATIENT)
Dept: LAB | Facility: HOSPITAL | Age: 31
End: 2021-03-30
Attending: PREVENTIVE MEDICINE
Payer: COMMERCIAL

## 2021-03-30 ENCOUNTER — TELEPHONE (OUTPATIENT)
Dept: INTERNAL MEDICINE CLINIC | Facility: HOSPITAL | Age: 31
End: 2021-03-30

## 2021-03-30 DIAGNOSIS — Z20.822 SUSPECTED COVID-19 VIRUS INFECTION: Primary | ICD-10-CM

## 2021-03-30 DIAGNOSIS — Z20.822 SUSPECTED COVID-19 VIRUS INFECTION: ICD-10-CM

## 2021-03-30 NOTE — TELEPHONE ENCOUNTER
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   History of previous covid testing: Yes [x]     No []  November 2020; negative.   Any recent travel: Yes [x]     No []    If yes, location: Was in [place] in their vehicle when arrived at site until they are contacted for testing    [x]  Plan noted above  [x]  Length of time to obtain results  [x]  Quarantine instructions  Lives with boyfriend who is positive.    [x]  S/S of worsening infection/condition and

## 2021-03-31 NOTE — TELEPHONE ENCOUNTER
Results and RTW guidelines:    COVID RESULT GIVEN:      Test type:    [] Rapid         [x] Alinity      [x] NEGATIVE     Ordered Alinity retest?  []Yes   [x] No (skip to RTW)  Notes: Remains asymptomatic    RTW PLAN:    [] RTW 10 days with clearance fr for 24 hours w/o medications  [] Alinity ordered; continue to monitor sx and call for new/worsening sx.   Discuss RTW guidelines with manager  [x] May continue to work  [] Follow up with PCP  [] Home until further instruction from hotline with Jose resul

## 2021-04-05 ENCOUNTER — LAB ENCOUNTER (OUTPATIENT)
Dept: LAB | Facility: HOSPITAL | Age: 31
End: 2021-04-05
Attending: PREVENTIVE MEDICINE
Payer: COMMERCIAL

## 2021-05-13 ENCOUNTER — TELEMEDICINE (OUTPATIENT)
Dept: FAMILY MEDICINE CLINIC | Facility: CLINIC | Age: 31
End: 2021-05-13
Payer: COMMERCIAL

## 2021-05-13 DIAGNOSIS — I10 ESSENTIAL HYPERTENSION: Primary | ICD-10-CM

## 2021-05-13 DIAGNOSIS — R06.83 SNORING: ICD-10-CM

## 2021-05-13 DIAGNOSIS — G47.9 SLEEP DIFFICULTIES: ICD-10-CM

## 2021-05-13 PROCEDURE — 99214 OFFICE O/P EST MOD 30 MIN: CPT | Performed by: NURSE PRACTITIONER

## 2021-05-13 RX ORDER — LABETALOL 100 MG/1
100 TABLET, FILM COATED ORAL 2 TIMES DAILY
Qty: 60 TABLET | Refills: 1 | Status: SHIPPED | OUTPATIENT
Start: 2021-05-13 | End: 2021-06-06

## 2021-05-13 NOTE — ASSESSMENT & PLAN NOTE
Continue healthy diet and exercise  Will start labetalol 100 mg I po bid  Sleep study  Check bp daily  Follow up 2 weeks

## 2021-06-05 DIAGNOSIS — I10 ESSENTIAL HYPERTENSION: ICD-10-CM

## 2021-06-06 RX ORDER — LABETALOL 100 MG/1
TABLET, FILM COATED ORAL
Qty: 60 TABLET | Refills: 0 | Status: SHIPPED | OUTPATIENT
Start: 2021-06-06 | End: 2021-06-25

## 2021-06-25 ENCOUNTER — TELEMEDICINE (OUTPATIENT)
Dept: FAMILY MEDICINE CLINIC | Facility: CLINIC | Age: 31
End: 2021-06-25

## 2021-06-25 DIAGNOSIS — D24.2 FIBROADENOMA OF BREAST, LEFT: Primary | ICD-10-CM

## 2021-06-25 DIAGNOSIS — I10 ESSENTIAL HYPERTENSION: ICD-10-CM

## 2021-06-25 PROCEDURE — 99214 OFFICE O/P EST MOD 30 MIN: CPT | Performed by: NURSE PRACTITIONER

## 2021-06-25 RX ORDER — LABETALOL 100 MG/1
TABLET, FILM COATED ORAL
Qty: 60 TABLET | Refills: 0 | Status: SHIPPED | OUTPATIENT
Start: 2021-06-25 | End: 2021-06-28

## 2021-06-25 NOTE — PROGRESS NOTES
HPI   Video Visit  Pt here for follow up bp     120/70's in pm  130//90s in am  Hr not dropping below 60s    Needs order for 6 month left breast mammo follow up due to fibroadenoma    Review of Systems   Constitutional: Negative for activity change a No      Sexual activity: Not on file    Other Topics      Concerns:        Not on file    Social History Narrative      Not on file    Social Determinants of Health  Financial Resource Strain:       Difficulty of Paying Living Expenses:   Food Insecurity: List Items Addressed This Visit        Cardiovascular    Essential hypertension     Increase labetalol to 1 1/2 tabs po in evening and one tab in am  Check bp 1-2 times per day  Follow up 2 weeks         Relevant Medications    Labetalol HCl 100 MG Oral Ta

## 2021-06-25 NOTE — ASSESSMENT & PLAN NOTE
Increase labetalol to 1 1/2 tabs po in evening and one tab in am  Check bp 1-2 times per day  Follow up 2 weeks

## 2021-06-26 ENCOUNTER — TELEPHONE (OUTPATIENT)
Dept: FAMILY MEDICINE CLINIC | Facility: CLINIC | Age: 31
End: 2021-06-26

## 2021-06-26 DIAGNOSIS — I10 ESSENTIAL HYPERTENSION: ICD-10-CM

## 2021-06-26 NOTE — TELEPHONE ENCOUNTER
Current Outpatient Medications:   •  Labetalol HCl 100 MG Oral Tab, Take one tablet in am and 1 1/2 tab in pm., Disp: 60 tablet, Rfl: 0    Pharmacy Message:  Alternative Requested: Please send a new rx for Labetalol 100MG Tablets for a 90 day supply for ins

## 2021-06-29 RX ORDER — LABETALOL 100 MG/1
TABLET, FILM COATED ORAL
Qty: 135 TABLET | Refills: 1 | Status: SHIPPED | OUTPATIENT
Start: 2021-06-29 | End: 2021-07-06

## 2021-07-04 DIAGNOSIS — I10 ESSENTIAL HYPERTENSION: ICD-10-CM

## 2021-07-06 RX ORDER — LABETALOL 100 MG/1
TABLET, FILM COATED ORAL
Qty: 75 TABLET | Refills: 2 | Status: SHIPPED | OUTPATIENT
Start: 2021-07-06 | End: 2021-09-07

## 2021-07-06 RX ORDER — LABETALOL 100 MG/1
TABLET, FILM COATED ORAL
Qty: 60 TABLET | Refills: 0 | Status: SHIPPED | OUTPATIENT
Start: 2021-07-06 | End: 2021-07-06

## 2021-07-19 ENCOUNTER — OFFICE VISIT (OUTPATIENT)
Dept: SLEEP CENTER | Age: 31
End: 2021-07-19
Attending: NURSE PRACTITIONER
Payer: COMMERCIAL

## 2021-07-19 ENCOUNTER — HOSPITAL ENCOUNTER (OUTPATIENT)
Dept: MAMMOGRAPHY | Facility: HOSPITAL | Age: 31
Discharge: HOME | End: 2021-07-19
Attending: NURSE PRACTITIONER
Payer: COMMERCIAL

## 2021-07-19 DIAGNOSIS — G47.33 OSA (OBSTRUCTIVE SLEEP APNEA): Primary | ICD-10-CM

## 2021-07-19 DIAGNOSIS — D24.2 FIBROADENOMA OF BREAST, LEFT: ICD-10-CM

## 2021-07-19 DIAGNOSIS — I10 ESSENTIAL HYPERTENSION: ICD-10-CM

## 2021-07-19 DIAGNOSIS — R06.83 SNORING: ICD-10-CM

## 2021-07-19 PROCEDURE — 95806 SLEEP STUDY UNATT&RESP EFFT: CPT

## 2021-07-19 PROCEDURE — 77065 DX MAMMO INCL CAD UNI: CPT | Performed by: NURSE PRACTITIONER

## 2021-07-19 PROCEDURE — 77061 BREAST TOMOSYNTHESIS UNI: CPT | Performed by: NURSE PRACTITIONER

## 2021-07-20 PROBLEM — D24.2 FIBROADENOMA OF BREAST, LEFT: Status: ACTIVE | Noted: 2021-07-20

## 2021-07-22 NOTE — PROCEDURES
[address] [facility]  Accredited by the [place] [employer] (AASM)    PATIENT'S NAME: Jerome Mallory   ATTENDING PHYSICIAN: Nathan Courtney PHYSICIAN:    PATIENT ACCOUNT #: [de-identified] LOCATION: [facility] snoring was appreciated. RECOMMENDATIONS:    1. Clinical correlation is required. 2.   Avoid alcohol. 3.   Avoid sedating drug. 4.   Patient should not drive if at all sleepy.   5.   If narcolepsy is a diagnostic consideration, can consider multip

## 2021-09-07 ENCOUNTER — OFFICE VISIT (OUTPATIENT)
Dept: FAMILY MEDICINE CLINIC | Facility: CLINIC | Age: 31
End: 2021-09-07
Payer: COMMERCIAL

## 2021-09-07 ENCOUNTER — HOSPITAL ENCOUNTER (OUTPATIENT)
Dept: GENERAL RADIOLOGY | Age: 31
Discharge: HOME | End: 2021-09-07
Attending: PHYSICIAN ASSISTANT
Payer: COMMERCIAL

## 2021-09-07 VITALS
HEIGHT: 65 IN | DIASTOLIC BLOOD PRESSURE: 107 MMHG | BODY MASS INDEX: 26.49 KG/M2 | WEIGHT: 159 LBS | HEART RATE: 76 BPM | SYSTOLIC BLOOD PRESSURE: 164 MMHG

## 2021-09-07 DIAGNOSIS — M25.552 LEFT HIP PAIN: ICD-10-CM

## 2021-09-07 DIAGNOSIS — I10 ESSENTIAL HYPERTENSION: Primary | ICD-10-CM

## 2021-09-07 LAB
APPEARANCE: CLEAR
BILIRUBIN: NEGATIVE
GLUCOSE (URINE DIPSTICK): NEGATIVE MG/DL
KETONES (URINE DIPSTICK): NEGATIVE MG/DL
LEUKOCYTES: NEGATIVE
MULTISTIX LOT#: ABNORMAL NUMERIC
NITRITE, URINE: NEGATIVE
PH, URINE: 5 (ref 4.5–8)
PROTEIN (URINE DIPSTICK): NEGATIVE MG/DL
SPECIFIC GRAVITY: 1.01 (ref 1–1.03)
URINE-COLOR: YELLOW
UROBILINOGEN,SEMI-QN: 0.2 MG/DL (ref 0–1.9)

## 2021-09-07 PROCEDURE — 99213 OFFICE O/P EST LOW 20 MIN: CPT | Performed by: PHYSICIAN ASSISTANT

## 2021-09-07 PROCEDURE — 3080F DIAST BP >= 90 MM HG: CPT | Performed by: PHYSICIAN ASSISTANT

## 2021-09-07 PROCEDURE — 3077F SYST BP >= 140 MM HG: CPT | Performed by: PHYSICIAN ASSISTANT

## 2021-09-07 PROCEDURE — 81002 URINALYSIS NONAUTO W/O SCOPE: CPT | Performed by: PHYSICIAN ASSISTANT

## 2021-09-07 PROCEDURE — 73502 X-RAY EXAM HIP UNI 2-3 VIEWS: CPT | Performed by: PHYSICIAN ASSISTANT

## 2021-09-07 PROCEDURE — 3008F BODY MASS INDEX DOCD: CPT | Performed by: PHYSICIAN ASSISTANT

## 2021-09-07 RX ORDER — AMLODIPINE BESYLATE 10 MG/1
10 TABLET ORAL DAILY
Qty: 90 TABLET | Refills: 1 | Status: SHIPPED | OUTPATIENT
Start: 2021-09-07 | End: 2021-12-06

## 2021-09-07 RX ORDER — LABETALOL 100 MG/1
100 TABLET, FILM COATED ORAL 2 TIMES DAILY
Qty: 180 TABLET | Refills: 1 | COMMUNITY
Start: 2021-09-07 | End: 2021-11-29

## 2021-09-07 RX ORDER — CYCLOBENZAPRINE HCL 5 MG
5 TABLET ORAL NIGHTLY
Qty: 60 TABLET | Refills: 0 | Status: SHIPPED | OUTPATIENT
Start: 2021-09-07

## 2021-09-08 NOTE — PROGRESS NOTES
HPI:     HPI   27year-old female is here in the office complaining of left side of back pain, radiates to left hip for the past week. The pain is at a severity of 4/10. The pain is intermittent.   Patient denies of chest pain, SOB, N/V/C/D, fever, dizzines • Breast Cancer Maternal Aunt 79        grt aunt    • Pancreatic Cancer Paternal Uncle        Social History:   Social History    Socioeconomic History      Marital status: Single      Spouse name: Not on file      Number of children: Not on file      [place] tightness, shortness of breath and wheezing. Cardiovascular: Negative for chest pain and palpitations. Gastrointestinal: Negative for abdominal distention, abdominal pain, blood in stool, constipation, diarrhea, nausea and vomiting.    Skin: Negative f NONAUTO W/O SCOPE (Completed)    XR HIP W OR WO PELVIS 2 OR 3 VIEWS, LEFT (CPT=73502) (Completed)        Discussed plan of care with pt and pt is in agreement. All questions answered. Pt to call with questions or concerns.

## 2021-10-26 DIAGNOSIS — Z30.41 ENCOUNTER FOR SURVEILLANCE OF CONTRACEPTIVE PILLS: ICD-10-CM

## 2021-10-26 RX ORDER — NORGESTIMATE AND ETHINYL ESTRADIOL 7DAYSX3 LO
1 KIT ORAL DAILY
Qty: 84 TABLET | Refills: 1 | Status: SHIPPED | OUTPATIENT
Start: 2021-10-26 | End: 2022-04-18

## 2021-10-26 NOTE — TELEPHONE ENCOUNTER
Refill passed per [address] protocol. THINPREP PAP SMEAR ONLY: Patient Communication    Append Comments  Seen      Your pap smear is normal. Your hpv test is negative. Please repeat your pap smear in 5 years. Please make certain to come in for annual check -up. Your vaginal culture was negative for yeast, bacterial and trichomonas infection. Please let me know if you have any questions or concerns.    FREDI Banerjee, FNP-C   Written by JAM Banerjee on 12/2/2020  9:23 PM CST  Seen by patient Jack Morocho on 12/3/2020  1:29 PM      Requested Prescriptions   Pending Prescriptions Disp Refills    TRI-LO-OLEKSANDR 0.18/0.215/0.25 MG-25 MCG Oral Tab [Pharmacy Med Name: [facility] [place] TABLET] 84 tablet 3     Sig: TAKE 1 [facility]        Gynecology Medication Protocol Failed - 10/26/2021  8:57 AM        Failed - Pass dependent on manual look-up of last PAP and patient compliance with PAP follow up recommendations        Passed - Appointment in past 12 or next 3 months             Recent Outpatient Visits              1 month ago Essential hypertension    [facility], [facility]    Office Visit    3 months ago SEVERO (obstructive sleep apnea)    [address] [facility]    Office Visit    4 months ago Fibroadenoma of breast, left    [address], JAM Baptiste    Telemedicine    5 months ago Essential hypertension    [address], JAM Baptiste    Telemedicine    11 months ago Well woman exam with routine gynecological exam    [address], [address], JAM Rowe    Office Visit

## 2021-11-23 ENCOUNTER — OFFICE VISIT (OUTPATIENT)
Dept: FAMILY MEDICINE CLINIC | Facility: CLINIC | Age: 31
End: 2021-11-23
Payer: COMMERCIAL

## 2021-11-23 VITALS
DIASTOLIC BLOOD PRESSURE: 76 MMHG | SYSTOLIC BLOOD PRESSURE: 127 MMHG | WEIGHT: 161 LBS | BODY MASS INDEX: 26.82 KG/M2 | HEART RATE: 71 BPM | HEIGHT: 65 IN

## 2021-11-23 DIAGNOSIS — I10 ESSENTIAL HYPERTENSION: ICD-10-CM

## 2021-11-23 DIAGNOSIS — Z00.00 WELL ADULT EXAM: Primary | ICD-10-CM

## 2021-11-23 DIAGNOSIS — G89.29 CHRONIC PAIN OF LEFT KNEE: ICD-10-CM

## 2021-11-23 DIAGNOSIS — M25.562 CHRONIC PAIN OF LEFT KNEE: ICD-10-CM

## 2021-11-23 PROBLEM — Z86.16 HISTORY OF 2019 NOVEL CORONAVIRUS DISEASE (COVID-19): Status: ACTIVE | Noted: 2021-11-23

## 2021-11-23 PROCEDURE — 3008F BODY MASS INDEX DOCD: CPT | Performed by: NURSE PRACTITIONER

## 2021-11-23 PROCEDURE — 3078F DIAST BP <80 MM HG: CPT | Performed by: NURSE PRACTITIONER

## 2021-11-23 PROCEDURE — 3074F SYST BP LT 130 MM HG: CPT | Performed by: NURSE PRACTITIONER

## 2021-11-23 PROCEDURE — 99395 PREV VISIT EST AGE 18-39: CPT | Performed by: NURSE PRACTITIONER

## 2021-11-23 NOTE — PROGRESS NOTES
HPI  Pt here for annual physical.   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 Had Covid 19 last November-tested negative x3 but later had antibodies 3 weeks later. Had intermittent fevers 103 for 9 days.  Had increase shortness of breath d Vertigo        .   Past Surgical History:   Procedure Laterality Date   • Needle biopsy left         Family History   Problem Relation Age of Onset   • Hypertension Father    • Cancer Father         prostate   • Hypertension Mother    • Heart Disorder Mater Tab Take 1 tablet (5 mg total) by mouth nightly. 60 tablet 0   • amLODIPine 10 MG Oral Tab Take 1 tablet (10 mg total) by mouth daily.  90 tablet 1       Allergies:    Triaminic Night Luisito*    HIVES    Comment:Pt has taken similar medication as an adult and Plan:  Problem List Items Addressed This Visit        Cardiovascular    Essential hypertension     cpm  Check bp and notify if consistently >140/90            Musculoskeletal    Chronic pain of left knee     Ice 20 min 4-6 times per day  voltaren gel

## 2021-11-24 ENCOUNTER — LAB ENCOUNTER (OUTPATIENT)
Dept: LAB | Age: 31
End: 2021-11-24
Attending: NURSE PRACTITIONER
Payer: COMMERCIAL

## 2021-11-24 ENCOUNTER — TELEPHONE (OUTPATIENT)
Dept: FAMILY MEDICINE CLINIC | Facility: CLINIC | Age: 31
End: 2021-11-24

## 2021-11-24 DIAGNOSIS — Z00.00 WELL ADULT EXAM: ICD-10-CM

## 2021-11-24 PROCEDURE — 84443 ASSAY THYROID STIM HORMONE: CPT

## 2021-11-24 PROCEDURE — 80053 COMPREHEN METABOLIC PANEL: CPT

## 2021-11-24 PROCEDURE — 36415 COLL VENOUS BLD VENIPUNCTURE: CPT

## 2021-11-24 PROCEDURE — 80061 LIPID PANEL: CPT

## 2021-11-27 PROBLEM — S16.1XXA NECK MUSCLE STRAIN: Status: RESOLVED | Noted: 2020-07-01 | Resolved: 2021-11-27

## 2021-11-27 NOTE — ASSESSMENT & PLAN NOTE
Ice 20 min 4-6 times per day  voltaren gel  nsaids as directed  Refer PT  Please call if symptoms worsen or are not resolving.

## 2021-11-27 NOTE — ASSESSMENT & PLAN NOTE
Screening labs  Has flu and covid vaccines  Please aim to eat a diet high in fresh fruits and vegetables, lean protein sources, complex carbohydrates and limited processed and fast foods.   Try to get at least 150 minutes of exercise per week-a combination

## 2021-11-29 DIAGNOSIS — I10 ESSENTIAL HYPERTENSION: ICD-10-CM

## 2021-11-29 RX ORDER — LABETALOL 100 MG/1
100 TABLET, FILM COATED ORAL 2 TIMES DAILY
Qty: 225 TABLET | Refills: 1 | Status: SHIPPED | OUTPATIENT
Start: 2021-11-29

## 2021-11-29 NOTE — TELEPHONE ENCOUNTER
Refill passed per [facility] protocol.    Requested Prescriptions   Pending Prescriptions Disp Refills    LABETALOL 100 MG Oral Tab [Pharmacy Med Name: LABETALOL  MG TABLET] 225 tablet 0     Sig: TAKE 1 TABLET BY MOUTH IN THE MORNING AND 1.5 TAB

## 2022-01-25 ENCOUNTER — IMMUNIZATION (OUTPATIENT)
Dept: LAB | Facility: HOSPITAL | Age: 32
End: 2022-01-25
Attending: EMERGENCY MEDICINE
Payer: COMMERCIAL

## 2022-01-25 DIAGNOSIS — Z23 NEED FOR VACCINATION: Primary | ICD-10-CM

## 2022-01-25 PROCEDURE — 0004A SARSCOV2 VAC 30MCG/0.3ML IM: CPT

## 2022-02-11 ENCOUNTER — OFFICE VISIT (OUTPATIENT)
Dept: FAMILY MEDICINE CLINIC | Facility: CLINIC | Age: 32
End: 2022-02-11
Payer: COMMERCIAL

## 2022-02-11 VITALS
RESPIRATION RATE: 18 BRPM | BODY MASS INDEX: 26.66 KG/M2 | DIASTOLIC BLOOD PRESSURE: 93 MMHG | OXYGEN SATURATION: 97 % | SYSTOLIC BLOOD PRESSURE: 125 MMHG | HEIGHT: 65 IN | HEART RATE: 75 BPM | TEMPERATURE: 98 F | WEIGHT: 160 LBS

## 2022-02-11 DIAGNOSIS — R39.9 UTI SYMPTOMS: Primary | ICD-10-CM

## 2022-02-11 LAB
BILIRUBIN: NEGATIVE
GLUCOSE (URINE DIPSTICK): NEGATIVE MG/DL
KETONES (URINE DIPSTICK): NEGATIVE MG/DL
MULTISTIX LOT#: ABNORMAL NUMERIC
NITRITE, URINE: POSITIVE
PH, URINE: 6.5 (ref 4.5–8)
PROTEIN (URINE DIPSTICK): NEGATIVE MG/DL
SPECIFIC GRAVITY: 1.01 (ref 1–1.03)
URINE-COLOR: YELLOW
UROBILINOGEN,SEMI-QN: 0.2 MG/DL (ref 0–1.9)

## 2022-02-11 PROCEDURE — 81003 URINALYSIS AUTO W/O SCOPE: CPT | Performed by: NURSE PRACTITIONER

## 2022-02-11 PROCEDURE — 87088 URINE BACTERIA CULTURE: CPT | Performed by: NURSE PRACTITIONER

## 2022-02-11 PROCEDURE — 99213 OFFICE O/P EST LOW 20 MIN: CPT | Performed by: NURSE PRACTITIONER

## 2022-02-11 PROCEDURE — 3080F DIAST BP >= 90 MM HG: CPT | Performed by: NURSE PRACTITIONER

## 2022-02-11 PROCEDURE — 3008F BODY MASS INDEX DOCD: CPT | Performed by: NURSE PRACTITIONER

## 2022-02-11 PROCEDURE — 87086 URINE CULTURE/COLONY COUNT: CPT | Performed by: NURSE PRACTITIONER

## 2022-02-11 PROCEDURE — 3074F SYST BP LT 130 MM HG: CPT | Performed by: NURSE PRACTITIONER

## 2022-02-11 PROCEDURE — 87186 SC STD MICRODIL/AGAR DIL: CPT | Performed by: NURSE PRACTITIONER

## 2022-02-11 RX ORDER — NITROFURANTOIN 25; 75 MG/1; MG/1
100 CAPSULE ORAL 2 TIMES DAILY
Qty: 10 CAPSULE | Refills: 0 | Status: SHIPPED | OUTPATIENT
Start: 2022-02-11 | End: 2022-02-16

## 2022-02-24 ENCOUNTER — LAB ENCOUNTER (OUTPATIENT)
Dept: LAB | Facility: HOSPITAL | Age: 32
End: 2022-02-24
Attending: NURSE PRACTITIONER
Payer: COMMERCIAL

## 2022-02-24 ENCOUNTER — PATIENT MESSAGE (OUTPATIENT)
Dept: FAMILY MEDICINE CLINIC | Facility: CLINIC | Age: 32
End: 2022-02-24

## 2022-02-24 ENCOUNTER — TELEPHONE (OUTPATIENT)
Dept: FAMILY MEDICINE CLINIC | Facility: CLINIC | Age: 32
End: 2022-02-24

## 2022-02-24 LAB
BILIRUB UR QL: NEGATIVE
CLARITY UR: CLEAR
COLOR UR: YELLOW
GLUCOSE UR-MCNC: NEGATIVE MG/DL
KETONES UR-MCNC: NEGATIVE MG/DL
NITRITE UR QL STRIP.AUTO: NEGATIVE
PH UR: 6 [PH] (ref 5–8)
PROT UR-MCNC: NEGATIVE MG/DL
SP GR UR STRIP: 1 (ref 1–1.03)
UROBILINOGEN UR STRIP-ACNC: <2

## 2022-02-24 PROCEDURE — 81001 URINALYSIS AUTO W/SCOPE: CPT | Performed by: NURSE PRACTITIONER

## 2022-02-24 PROCEDURE — 87086 URINE CULTURE/COLONY COUNT: CPT | Performed by: NURSE PRACTITIONER

## 2022-02-24 NOTE — TELEPHONE ENCOUNTER
Patient confirms went to [facility] for UTI and completed Bactrim, symptoms did resolve after completing antibiotic, today started with urgency and frequency, feels some discomfort to her bladder. Reports she will end her cycle today or tomorrow. Please advise if patient should repeat UA. [facility], Division of Infectious Diseases  ASHLYN Byrd A. Lee  [phone]    Name: LOIS ROBERTS  Age: 52y  Gender: Female  MRN: [id number]    Interval History--  Notes reviewed. No new complaints. Feels about the same. No fevers, chills, or rigors.   C. diff PCR negative.      Past Medical History--  Obstructive sleep apnea  Sarcoidosis  Pre-diabetes  Anticardiolipin antibody syndrome  Lupus  Hypertension  Hyperlipidemia  Clotting disorder  H/O  section  No significant past surgical history      For details regarding the patient's social history, family history, and other miscellaneous elements, please refer the initial infectious diseases consultation and/or the admitting history and physical examination for this admission.    Allergies    Paxil (Rash)  penicillin (Rash)    Intolerances        Medications--  Antibiotics:  cefTRIAXone   IVPB 1000 milliGRAM(s) IV Intermittent every 24 hours  hydroxychloroquine 200 milliGRAM(s) Oral two times a day  vancomycin  IVPB 1250 milliGRAM(s) IV Intermittent two times a day  vancomycin  IVPB        Immunologic:    Other:  acetaminophen   Tablet .. PRN  acetaminophen   Tablet .. PRN  baclofen  cholecalciferol  dextrose 40% Gel PRN  dextrose 5%.  dextrose 50% Injectable  escitalopram  glucagon  Injectable PRN  insulin lispro (HumaLOG) corrective regimen sliding scale  lactobacillus acidophilus  losartan  morphine  - Injectable PRN  multivitamin  senna PRN  simvastatin  topiramate  triamterene 37.5 mG/hydrochlorothiazide 25 mG Tablet  warfarin      Review of Systems--  A 10-point review of systems was obtained.  Review of systems otherwise unchanged compared to prior visit except as previously noted.    Physical Examination--  Vital Signs: T(F): 99.1 (19 @ 06:12), Max: 100.1 (19 @ 21:03)  HR: 76 (19 @ 06:12)  BP: 143/81 (19 @ 06:12)  RR: 16 (19 @ 06:12)  SpO2: 97% (19 @ 06:12)  Wt(kg): --  General: Nontoxic-appearing Female in no acute distress.  HEENT: AT/NC. Anicteric. Conjunctiva pink and moist. Oropharynx clear.  Neck: Not rigid. No sense of mass.  Nodes: None palpable.  Lungs: Clear bilaterally without rales, wheezing or rhonchi  Chest: extensive cellulitis L breast somewhat decreased in intensity but decreased in intensity. Decreased calor, tenderness and induraiton. No purulence.   Heart: Regular rate and rhythm. No Murmur. No rub. No gallop. No palpable thrill.  Abdomen: Bowel sounds present and normoactive. Soft. Nondistended. Nontender. No sense of mass. No organomegaly.  Extremities: No cyanosis or clubbing. No edema.   Skin: Warm. Dry. Good turgor. No rash. No vasculitic stigmata.  Psychiatric: Appropriate affect and mood for situation.       Laboratory Studies--  CBC                        14.6   7.42  )-----------( 255      ( 2019 09:28 )             44.5       Chemistries  -    140  |  105  |  13  ----------------------------<  113<H>  3.4<L>   |  29  |  0.79    Ca    9.2      2019 09:28      Culture Data  Culture - Blood (collected 2019 20:25)  Source: .Blood Blood  Preliminary Report (2019 21:01):    No growth to date.    Culture - Blood (collected 2019 20:25)  Source: .Blood Blood  Preliminary Report (2019 21:01):    No growth to date.

## 2022-02-24 NOTE — TELEPHONE ENCOUNTER
----- Message from Carmelina North RN sent at 2/24/2022 12:11 PM CST -----  Regarding: FW: UTI symptoms      ----- Message -----  From: Yair Elias  Sent: 2/24/2022   9:54 AM CST  To: Marcia Rn Triage  Subject: UTI symptoms                                     Hello, on February 11th I went to a walk-in clinic for UTI symptoms. Cultures came back positive with e coli, I took 5 days of macrobid which I finished last Tuesday. Today I'm waking up with urinary urgency again. I am also at the end of my menstrual cycle, I don't know if that might be the cause? Should I get tested again for a UTI?

## 2022-02-24 NOTE — TELEPHONE ENCOUNTER
Leo Jaimes RN 2/24/2022 12:11 PM CST        ----- Message -----  From: Arabella Thornton  Sent: 2/24/2022 9:54 AM CST  To: Em Rn Triage  Subject: UTI symptoms     Hello, on February 11th I went to a walk-in clinic for UTI symptoms. Cultures came back positive with e coli, I took 5 days of macrobid which I finished last Tuesday. Today I'm waking up with urinary urgency again. I am also at the end of my menstrual cycle, I don't know if that might be the cause? Should I get tested again for a UTI?

## 2022-02-27 DIAGNOSIS — I10 ESSENTIAL HYPERTENSION: ICD-10-CM

## 2022-02-28 RX ORDER — AMLODIPINE BESYLATE 10 MG/1
10 TABLET ORAL DAILY
Qty: 90 TABLET | Refills: 1 | Status: SHIPPED | OUTPATIENT
Start: 2022-02-28 | End: 2022-08-29

## 2022-02-28 NOTE — TELEPHONE ENCOUNTER
Refill passed per [facility] protocol.     Requested Prescriptions   Pending Prescriptions Disp Refills    AMLODIPINE 10 MG Oral Tab [Pharmacy Med Name: AMLODIPINE BESYLATE 10 MG TAB] 90 tablet 1     Sig: TAKE 1 TABLET BY MOUTH EVERY DAY        Hypertensive Medications Protocol Passed - 2/27/2022 12:17 AM        Passed - CMP or BMP in past 12 months        Passed - Appointment in past 6 or next 3 months        Passed - GFR Non- > 50     Lab Results   Component Value Date    GFRNAA 79 11/24/2021                       Recent Outpatient Visits              2 weeks ago UTI symptoms    [address], Nuha Colvin, APRN    Office Visit    3 months ago Well adult exam    [facility], [address] [address], [address], Nick Leung, APRN    Office Visit    5 months ago Essential hypertension    [facility], [facility], PA-C    Office Visit    7 months ago SEVERO (obstructive sleep apnea)    [facility]    Office Visit    8 months ago Fibroadenoma of breast, left    [address], APRN    Telemedicine

## 2022-03-28 ENCOUNTER — NURSE TRIAGE (OUTPATIENT)
Dept: FAMILY MEDICINE CLINIC | Facility: CLINIC | Age: 32
End: 2022-03-28

## 2022-03-29 ENCOUNTER — OFFICE VISIT (OUTPATIENT)
Dept: FAMILY MEDICINE CLINIC | Facility: CLINIC | Age: 32
End: 2022-03-29
Payer: COMMERCIAL

## 2022-03-29 VITALS
BODY MASS INDEX: 27.66 KG/M2 | HEART RATE: 68 BPM | HEIGHT: 65 IN | WEIGHT: 166 LBS | DIASTOLIC BLOOD PRESSURE: 74 MMHG | SYSTOLIC BLOOD PRESSURE: 113 MMHG

## 2022-03-29 DIAGNOSIS — R35.0 FREQUENT URINATION: Primary | ICD-10-CM

## 2022-03-29 LAB
BILIRUB UR QL: NEGATIVE
BILIRUBIN: NEGATIVE
CLARITY UR: CLEAR
COLOR UR: YELLOW
GLUCOSE (URINE DIPSTICK): NEGATIVE MG/DL
GLUCOSE UR-MCNC: NEGATIVE MG/DL
KETONES (URINE DIPSTICK): NEGATIVE MG/DL
KETONES UR-MCNC: NEGATIVE MG/DL
MULTISTIX LOT#: ABNORMAL NUMERIC
NITRITE UR QL STRIP.AUTO: NEGATIVE
NITRITE, URINE: NEGATIVE
PH UR: 7 [PH] (ref 5–8)
PH, URINE: 7 (ref 4.5–8)
PROT UR-MCNC: NEGATIVE MG/DL
PROTEIN (URINE DIPSTICK): NEGATIVE MG/DL
SP GR UR STRIP: 1.01 (ref 1–1.03)
SPECIFIC GRAVITY: 1.01 (ref 1–1.03)
URINE-COLOR: YELLOW
UROBILINOGEN UR STRIP-ACNC: <2
UROBILINOGEN,SEMI-QN: 0.2 MG/DL (ref 0–1.9)
VIT C UR-MCNC: NEGATIVE MG/DL

## 2022-03-29 PROCEDURE — 3078F DIAST BP <80 MM HG: CPT | Performed by: FAMILY MEDICINE

## 2022-03-29 PROCEDURE — 3074F SYST BP LT 130 MM HG: CPT | Performed by: FAMILY MEDICINE

## 2022-03-29 PROCEDURE — 3008F BODY MASS INDEX DOCD: CPT | Performed by: FAMILY MEDICINE

## 2022-03-29 PROCEDURE — 81002 URINALYSIS NONAUTO W/O SCOPE: CPT | Performed by: FAMILY MEDICINE

## 2022-03-29 PROCEDURE — 99213 OFFICE O/P EST LOW 20 MIN: CPT | Performed by: FAMILY MEDICINE

## 2022-03-29 NOTE — PROGRESS NOTES
Presents today complaining of burning with urination. Recently finished her menstrual period. Denies fever vomiting. Objective urine with trace leukocytes    Back without CVA tenderness    Assessment dysuria with trace leukocytes on urine dipstick    Plan no antibiotic for now we will send urine for culture patient will contact me in 2 days for results.

## 2022-04-07 ENCOUNTER — TELEPHONE (OUTPATIENT)
Dept: PHYSICAL THERAPY | Facility: HOSPITAL | Age: 32
End: 2022-04-07

## 2022-04-13 ENCOUNTER — OFFICE VISIT (OUTPATIENT)
Dept: PHYSICAL THERAPY | Age: 32
End: 2022-04-13
Attending: NURSE PRACTITIONER
Payer: COMMERCIAL

## 2022-04-13 DIAGNOSIS — M25.562 CHRONIC PAIN OF LEFT KNEE: ICD-10-CM

## 2022-04-13 DIAGNOSIS — G89.29 CHRONIC PAIN OF LEFT KNEE: ICD-10-CM

## 2022-04-13 PROCEDURE — 97110 THERAPEUTIC EXERCISES: CPT | Performed by: PHYSICAL THERAPIST

## 2022-04-13 PROCEDURE — 97161 PT EVAL LOW COMPLEX 20 MIN: CPT | Performed by: PHYSICAL THERAPIST

## 2022-04-14 ENCOUNTER — APPOINTMENT (OUTPATIENT)
Dept: PHYSICAL THERAPY | Age: 32
End: 2022-04-14
Attending: NURSE PRACTITIONER
Payer: COMMERCIAL

## 2022-04-15 ENCOUNTER — OFFICE VISIT (OUTPATIENT)
Dept: PHYSICAL THERAPY | Age: 32
End: 2022-04-15
Attending: NURSE PRACTITIONER
Payer: COMMERCIAL

## 2022-04-15 PROCEDURE — 97110 THERAPEUTIC EXERCISES: CPT | Performed by: PHYSICAL THERAPIST

## 2022-04-15 PROCEDURE — 97035 APP MDLTY 1+ULTRASOUND EA 15: CPT | Performed by: PHYSICAL THERAPIST

## 2022-04-15 NOTE — PROGRESS NOTES
Dx: Chronic pain of [address] (Authorized # of Visits):  2           Authorizing Physician: Dr. Felix Ayala MD visit: none scheduled  Fall Risk: standard         Precautions: n/a             Subjective: Patient reports exercises went ok. Pain: 2/10 - at start of push off       Objective: see outpatient daily record. Assessment: Added further work with ultrasound ITB insertion area lateral knee, foam roller/IASTM. Continued strengthening gluts and flexibility work. Added patellar taping for light stretch lateral knee. Goals:   Goals: (to be met in 8-12 visits)  1. Patient to be independent with ongoing HEP  2. Patient to be able to straighten L knee without pain. 3. Patient to have improved strength L glut area by 1/2 to 1 mm grade for improved stability single leg L.  4. Patient to be able to return to all activities without pain at L lateral knee. Plan: Continue use of modalities, stretches, strengthening advance as able.    Date: 4/15/2022  TX#: 2/   Ultrasound lateral knee area x 8 min at 1.4 w/cm2 cont  STM following, IASTM ITB region, foam roller  ITB stretch x 2  Glut med GTB 2 x 15  Patellar medial glides - noted pulling side of knee  Patellar tape applied for light stretch Leukotape  Single leg bridge 2 x 10  4 point hip ext/abd with 3# x 15 each  ITB stretch step x 30 sec  Hip hike at step x 15  HS stretches various positions            HEP: Quad sets    Charges: ultrasound, Ex 2       Total Timed Treatment: 45 min  Total Treatment Time: 45 min

## 2022-04-18 RX ORDER — NORGESTIMATE AND ETHINYL ESTRADIOL
KIT
Qty: 84 TABLET | Refills: 1 | Status: SHIPPED | OUTPATIENT
Start: 2022-04-18

## 2022-04-18 NOTE — TELEPHONE ENCOUNTER
Please review; protocol failed/No Protcol    Requested Prescriptions   Pending Prescriptions Disp Refills    TRI-LO-OLEKSANDR 0.18/0.215/0.25 MG-25 MCG Oral Tab [Pharmacy Med Name: TRI-LO-OLEKSANDR TABLET] 84 tablet 1     Sig: TAKE 1 [facility]        Gynecology Medication Protocol Failed - 4/16/2022  9:06 AM        Failed - Pass dependent on manual look-up of last PAP and patient compliance with PAP follow up recommendations        Passed - Appointment in past 12 or next 3 months              Recent Outpatient Visits              3 days ago     [facility] in [place], [place]    Office Visit    5 days ago Chronic pain of left knee    [address] [place] [address] in [place], [place]    Office Visit    2 weeks ago Frequent urination    [address], DO    Office Visit    2 months ago UTI symptoms    [address], Jettie Small, APRN    Office Visit    4 months ago Well adult exam    [address], [address], [address], [facility], APRN    Office Visit            Future Appointments         Provider Department Appt Notes    Tomorrow [facility],[address] in [place]  c/p $35, no auth, 60v pcy    In 4 days [address], [address] in [place]  c/p $35, no auth, 60v pcy    In 1 week [address], [address] in [place]  c/p $35, no auth, 60v pcy    In 1 week [facility] & [address] in [place]  c/p $35, no auth, 60v pcy    In 2 weeks [facility] & [address] in [place]  c/p $35, no auth, 60v pcy    In 2 weeks [facility] & [address] in [place]  c/p $35, no auth, 60v pcy    In 3 weeks [facility] & [address] in [place]  c/p $35, no auth, 60v pcy    In 3 arslan Mireles, ARIANE Torres in [place]  c/p $35, [facility] jeanette[facility]

## 2022-04-19 ENCOUNTER — APPOINTMENT (OUTPATIENT)
Dept: PHYSICAL THERAPY | Age: 32
End: 2022-04-19
Attending: NURSE PRACTITIONER
Payer: COMMERCIAL

## 2022-04-19 ENCOUNTER — OFFICE VISIT (OUTPATIENT)
Dept: PHYSICAL THERAPY | Age: 32
End: 2022-04-19
Attending: NURSE PRACTITIONER
Payer: COMMERCIAL

## 2022-04-19 PROCEDURE — 97014 ELECTRIC STIMULATION THERAPY: CPT | Performed by: PHYSICAL THERAPIST

## 2022-04-19 PROCEDURE — 97110 THERAPEUTIC EXERCISES: CPT | Performed by: PHYSICAL THERAPIST

## 2022-04-19 PROCEDURE — 97035 APP MDLTY 1+ULTRASOUND EA 15: CPT | Performed by: PHYSICAL THERAPIST

## 2022-04-19 NOTE — PROGRESS NOTES
Dx: Chronic pain of [address] (Authorized # of Visits):  2           Authorizing Physician: Dr. Carlita Ayala MD visit: none scheduled  Fall Risk: standard         Precautions: n/a             Subjective: Patient reports did have some cramping on the hydrant exercise at home, otherwise all ok. No change in her symptoms. Pain: 2/10 - at start of push off       Objective: see outpatient daily record. Assessment: Continued work with ITB stretches, glut strengthening, noted tenderness and inflammation bursa lateral knee compared to R. Use of Estim and heat end of session. Patient will use ice/heat alternating at home also. Will continue exercises except for 4 point to reduce pressure at knee. Goals:   Goals: (to be met in 8-12 visits)  1. Patient to be independent with ongoing HEP  2. Patient to be able to straighten L knee without pain. 3. Patient to have improved strength L glut area by 1/2 to 1 mm grade for improved stability single leg L.  4. Patient to be able to return to all activities without pain at L lateral knee. Plan: Continue use of modalities, stretches, strengthening advance as able. Date: 4/19/2022  TX#: 3   Ultrasound lateral knee area x 8 min at 1.4 w/cm2 cont  STM following, IASTM ITB region, foam roller  ITB stretch x 2  Glut med GTB 2 x 20  Patellar medial glides - noted pulling side of knee  Noted tenderness over bursa lateral knee  Single leg bridge x 10 each  Hip abd pro [place] with bridge x 10  Hip add with ball with bridge x 10  HS stretches supine, ITB stretch supine L   IFC and heat x 15 min lateral L knee         HEP: Stop 4 point exercises due to pressure at patella.      Charges: ultrasound, Ex 1, Estim (unattended)      Total Timed Treatment: 35 min  Total Treatment Time: 50 min

## 2022-04-20 ENCOUNTER — APPOINTMENT (OUTPATIENT)
Dept: PHYSICAL THERAPY | Age: 32
End: 2022-04-20
Attending: NURSE PRACTITIONER
Payer: COMMERCIAL

## 2022-04-21 ENCOUNTER — APPOINTMENT (OUTPATIENT)
Dept: PHYSICAL THERAPY | Age: 32
End: 2022-04-21
Attending: NURSE PRACTITIONER
Payer: COMMERCIAL

## 2022-04-22 ENCOUNTER — OFFICE VISIT (OUTPATIENT)
Dept: PHYSICAL THERAPY | Age: 32
End: 2022-04-22
Attending: NURSE PRACTITIONER
Payer: COMMERCIAL

## 2022-04-22 ENCOUNTER — APPOINTMENT (OUTPATIENT)
Dept: PHYSICAL THERAPY | Age: 32
End: 2022-04-22
Attending: NURSE PRACTITIONER
Payer: COMMERCIAL

## 2022-04-22 PROCEDURE — 97035 APP MDLTY 1+ULTRASOUND EA 15: CPT | Performed by: PHYSICAL THERAPIST

## 2022-04-22 PROCEDURE — 97110 THERAPEUTIC EXERCISES: CPT | Performed by: PHYSICAL THERAPIST

## 2022-04-22 PROCEDURE — 97014 ELECTRIC STIMULATION THERAPY: CPT | Performed by: PHYSICAL THERAPIST

## 2022-04-25 ENCOUNTER — APPOINTMENT (OUTPATIENT)
Dept: PHYSICAL THERAPY | Age: 32
End: 2022-04-25
Attending: NURSE PRACTITIONER
Payer: COMMERCIAL

## 2022-04-26 ENCOUNTER — APPOINTMENT (OUTPATIENT)
Dept: PHYSICAL THERAPY | Age: 32
End: 2022-04-26
Attending: NURSE PRACTITIONER
Payer: COMMERCIAL

## 2022-04-27 ENCOUNTER — APPOINTMENT (OUTPATIENT)
Dept: PHYSICAL THERAPY | Age: 32
End: 2022-04-27
Attending: NURSE PRACTITIONER
Payer: COMMERCIAL

## 2022-04-27 ENCOUNTER — OFFICE VISIT (OUTPATIENT)
Dept: PHYSICAL THERAPY | Age: 32
End: 2022-04-27
Attending: NURSE PRACTITIONER
Payer: COMMERCIAL

## 2022-04-27 PROCEDURE — 97110 THERAPEUTIC EXERCISES: CPT | Performed by: PHYSICAL THERAPIST

## 2022-04-27 PROCEDURE — 97014 ELECTRIC STIMULATION THERAPY: CPT | Performed by: PHYSICAL THERAPIST

## 2022-04-27 PROCEDURE — 97035 APP MDLTY 1+ULTRASOUND EA 15: CPT | Performed by: PHYSICAL THERAPIST

## 2022-04-27 NOTE — PROGRESS NOTES
Dx: Chronic pain of [address] (Authorized # of Visits):  5           Authorizing Physician: Dr. Altagracia Shah  Next MD visit: none scheduled  Fall Risk: standard         Precautions: n/a             Subjective: Patient reports the knee is about the same. Pain: 2/10 - at start of push off       Objective: see outpatient daily record. Assessment: Patient still with improved ability to straighten and bend knee but still some pain lateral knee. Added quad strengthening this session as noted atrophy L to R. Goals:   Goals: (to be met in 8-12 visits)  1. Patient to be independent with ongoing HEP  2. Patient to be able to straighten L knee without pain. 3. Patient to have improved strength L glut area by 1/2 to 1 mm grade for improved stability single leg L.  4. Patient to be able to return to all activities without pain at L lateral knee. Plan: Continue use of modalities, stretches, strengthening advance as able.    Date: 4/27/2022  TX#: 5   Ultrasound lateral knee area x 8 min at 1.4 w/cm2 cont  STM following, IASTM ITB region, foam roller  ITB stretch x 2  Glut med GTB 2 x 15  Patellar medial glides -   Noted tenderness over bursa lateral knee  SLR 2 x 10 YTB  Standing eccentric step downs holding rails x 15  Double wall squats with hip abd Blue theraband x 10  Single leg bridge x 10 each  Hip add with ball with bridge x 10  HS stretches supine, ITB stretch supine L   IFC and heat x 15 min lateral L knee         HEP: No changes     Charges: ultrasound, Ex 1, Estim (unattended)      Total Timed Treatment: 40 min  Total Treatment Time: 55 min

## 2022-04-28 ENCOUNTER — APPOINTMENT (OUTPATIENT)
Dept: PHYSICAL THERAPY | Age: 32
End: 2022-04-28
Attending: NURSE PRACTITIONER
Payer: COMMERCIAL

## 2022-04-29 ENCOUNTER — APPOINTMENT (OUTPATIENT)
Dept: PHYSICAL THERAPY | Age: 32
End: 2022-04-29
Attending: NURSE PRACTITIONER
Payer: COMMERCIAL

## 2022-05-02 ENCOUNTER — APPOINTMENT (OUTPATIENT)
Dept: PHYSICAL THERAPY | Age: 32
End: 2022-05-02
Attending: NURSE PRACTITIONER
Payer: COMMERCIAL

## 2022-05-03 ENCOUNTER — APPOINTMENT (OUTPATIENT)
Dept: PHYSICAL THERAPY | Age: 32
End: 2022-05-03
Attending: NURSE PRACTITIONER
Payer: COMMERCIAL

## 2022-05-04 ENCOUNTER — APPOINTMENT (OUTPATIENT)
Dept: PHYSICAL THERAPY | Age: 32
End: 2022-05-04
Attending: NURSE PRACTITIONER
Payer: COMMERCIAL

## 2022-05-04 ENCOUNTER — OFFICE VISIT (OUTPATIENT)
Dept: PHYSICAL THERAPY | Age: 32
End: 2022-05-04
Attending: NURSE PRACTITIONER
Payer: COMMERCIAL

## 2022-05-04 PROCEDURE — 97110 THERAPEUTIC EXERCISES: CPT | Performed by: PHYSICAL THERAPIST

## 2022-05-04 PROCEDURE — 97014 ELECTRIC STIMULATION THERAPY: CPT | Performed by: PHYSICAL THERAPIST

## 2022-05-04 NOTE — PROGRESS NOTES
Dx: Chronic pain of [address] (Authorized # of Visits):  10          Authorizing Physician: Dr. Altagracia Shah  Next MD visit: none scheduled  Fall Risk: standard         Precautions: n/a             Subjective: Patient reports she had difficulty doing the single leg squat , states at end of shift yesterday the knee was aching more. States did ride the bike on Friday or Saturday with some irritation but that went away and no problem on Sunday. Pain: 2-3/10 - at start of push off       Objective: see outpatient daily record. Assessment: Patient is still well less discomfort with heel slides and better ability to straighten the knee. We stopped any single knee squat and added double squat, single leg balance as patient with difficulty with control, reviewed HEP. Will continue to work on strength and stability L LE. Goals:   Goals: (to be met in 8-12 visits)  1. Patient to be independent with ongoing HEP  2. Patient to be able to straighten L knee without pain. 3. Patient to have improved strength L glut area by 1/2 to 1 mm grade for improved stability single leg L.  4. Patient to be able to return to all activities without pain at L lateral knee. Plan: Continue use of modalities, stretches, strengthening advance as able. Date: 5/4/2022  TX#: 6   , foam roller  ITB stretch x 2  Glut med GTB 2 x 15  Patellar medial glides -   Noted tenderness over bursa lateral knee  SLR 2 x 10 YTB  Standing eccentric step downs holding rails x 15  Double wall squats with hip abd Blue theraband x 10  Single leg bridge x 10 each  Hip add with ball with bridge x 10  Hip abd with GTB x 10  Standing squats x 10 floor  Standing squats x 10 inverted bosu  Side step RTB x 15 each  Frwd/back monster walks RTB x 10 each  Single leg balance floor with ball toss - painful. Single leg balance floor - 2 x 10 seconds - difficult  Side glut med on step x 20  IFC and heat x 12 minutes lateral knee. HEP: SLB, bilat squats, hip abd/flex/ext - monster walks.      Charges: Ex 2, Estim Unattended   Total Timed Treatment: 40 min  Total Treatment Time: 55 min

## 2022-05-05 ENCOUNTER — APPOINTMENT (OUTPATIENT)
Dept: PHYSICAL THERAPY | Age: 32
End: 2022-05-05
Attending: NURSE PRACTITIONER
Payer: COMMERCIAL

## 2022-05-06 ENCOUNTER — APPOINTMENT (OUTPATIENT)
Dept: PHYSICAL THERAPY | Age: 32
End: 2022-05-06
Attending: NURSE PRACTITIONER
Payer: COMMERCIAL

## 2022-05-06 ENCOUNTER — OFFICE VISIT (OUTPATIENT)
Dept: PHYSICAL THERAPY | Age: 32
End: 2022-05-06
Attending: NURSE PRACTITIONER
Payer: COMMERCIAL

## 2022-05-06 PROCEDURE — 97035 APP MDLTY 1+ULTRASOUND EA 15: CPT | Performed by: PHYSICAL THERAPIST

## 2022-05-06 PROCEDURE — 97140 MANUAL THERAPY 1/> REGIONS: CPT | Performed by: PHYSICAL THERAPIST

## 2022-05-06 NOTE — PROGRESS NOTES
Dx: Chronic pain of [address] (Authorized # of Visits):  6         Authorizing Physician: Dr. Oseas Ayala MD visit: none scheduled  Fall Risk: standard         Precautions: n/a             Subjective: Patient reports still pain when attempting the single leg balance, states the knee bending is better but still pain outside of knee. Has been using foam roller at home and continuing the exercises. Pain: 2-3/10 - at start of push off       Objective: see outpatient daily record. Assessment: Further work at ITB with IASTM, foam roller and use of tape to assess help with pain. Goals:   Goals: (to be met in 8-12 visits)  1. Patient to be independent with ongoing HEP  2. Patient to be able to straighten L knee without pain. 3. Patient to have improved strength L glut area by 1/2 to 1 mm grade for improved stability single leg L.  4. Patient to be able to return to all activities without pain at L lateral knee. Plan: Continue use of modalities, stretches, strengthening advance as able. Reassess tape  Date: 5/6/2022  TX#: 8   ultrasound x 8 min at 1.4 w/cm2 cont lateral knee area  ITB stretch x 2  Foam roller ITB area  IASMT ITB area  STM ITB  Medial glides patella  Glut med GTB 2 x 15  kinesiotape for ITB support/stretch, patellar support        .               HEP: to stop single leg balance, continue all others    Charges: ultrasound, MM 2 Total Timed Treatment: 40 min  Total Treatment Time: 40 min

## 2022-05-09 ENCOUNTER — APPOINTMENT (OUTPATIENT)
Dept: PHYSICAL THERAPY | Age: 32
End: 2022-05-09
Attending: NURSE PRACTITIONER
Payer: COMMERCIAL

## 2022-05-10 ENCOUNTER — APPOINTMENT (OUTPATIENT)
Dept: PHYSICAL THERAPY | Age: 32
End: 2022-05-10
Attending: NURSE PRACTITIONER
Payer: COMMERCIAL

## 2022-05-11 ENCOUNTER — OFFICE VISIT (OUTPATIENT)
Dept: PHYSICAL THERAPY | Age: 32
End: 2022-05-11
Attending: NURSE PRACTITIONER
Payer: COMMERCIAL

## 2022-05-11 ENCOUNTER — APPOINTMENT (OUTPATIENT)
Dept: PHYSICAL THERAPY | Age: 32
End: 2022-05-11
Attending: PHYSICIAN ASSISTANT
Payer: COMMERCIAL

## 2022-05-11 ENCOUNTER — APPOINTMENT (OUTPATIENT)
Dept: PHYSICAL THERAPY | Age: 32
End: 2022-05-11
Attending: NURSE PRACTITIONER
Payer: COMMERCIAL

## 2022-05-11 PROCEDURE — 97140 MANUAL THERAPY 1/> REGIONS: CPT | Performed by: PHYSICAL THERAPIST

## 2022-05-11 PROCEDURE — 97110 THERAPEUTIC EXERCISES: CPT | Performed by: PHYSICAL THERAPIST

## 2022-05-11 PROCEDURE — 97035 APP MDLTY 1+ULTRASOUND EA 15: CPT | Performed by: PHYSICAL THERAPIST

## 2022-05-11 NOTE — PROGRESS NOTES
Dx: Chronic pain of [address] (Authorized # of Visits):  6         Authorizing Physician: Dr. Earnest Paez  Next MD visit: none scheduled  Fall Risk: standard         Precautions: n/a             Subjective: Patient reports she liked the tape, did give support to the knee. States still same pain at side of knee. States did a squat work out at [facility], [place]. No pain when doing them but the next day knee was more painful. Pain: 2-3/10 - at start of push off       Objective: see outpatient daily record. Assessment: All exercises involving bending knee will be stopped for next 2 week. Patient instructed with SLR - hip flex/hip abd, hip add on table and standing hip abd/ext/flex each leg. Tape reapplied. Patient will continue foam rolling also at home. Will hold off on any gym workouts involving squatting or repetitive knee bends. Goals:   Goals: (to be met in 8-12 visits)  1. Patient to be independent with ongoing HEP  2. Patient to be able to straighten L knee without pain. 3. Patient to have improved strength L glut area by 1/2 to 1 mm grade for improved stability single leg L.  4. Patient to be able to return to all activities without pain at L lateral knee. Plan: Reassess in 1 week . Date: 5/11/2022  TX#: 8   ultrasound x 8 min at 1.4 w/cm2 cont lateral knee area  ITB stretch x 2  Foam roller ITB area  IASMT ITB area  STM ITB  SLR hip Abd 3 x 10 YTB  SLR supine YTB 3 x 10  SLR hip add 3 x 10  Medial glides patella  Standing hip abd/ext/flex with YTB x 10 each  kinesiotape for ITB support/stretch, patellar support        .               HEP: as noted above    Charges: ultrasound, MM 1, Ex 1 Total Timed Treatment: 40 min  Total Treatment Time: 40 min

## 2022-05-12 ENCOUNTER — APPOINTMENT (OUTPATIENT)
Dept: PHYSICAL THERAPY | Age: 32
End: 2022-05-12
Attending: NURSE PRACTITIONER
Payer: COMMERCIAL

## 2022-05-13 ENCOUNTER — APPOINTMENT (OUTPATIENT)
Dept: PHYSICAL THERAPY | Age: 32
End: 2022-05-13
Attending: NURSE PRACTITIONER
Payer: COMMERCIAL

## 2022-05-18 ENCOUNTER — OFFICE VISIT (OUTPATIENT)
Dept: PHYSICAL THERAPY | Age: 32
End: 2022-05-18
Attending: PHYSICIAN ASSISTANT
Payer: COMMERCIAL

## 2022-05-18 ENCOUNTER — PATIENT MESSAGE (OUTPATIENT)
Dept: FAMILY MEDICINE CLINIC | Facility: CLINIC | Age: 32
End: 2022-05-18

## 2022-05-18 DIAGNOSIS — M25.562 CHRONIC PAIN OF LEFT KNEE: ICD-10-CM

## 2022-05-18 DIAGNOSIS — M25.552 LEFT HIP PAIN: Primary | ICD-10-CM

## 2022-05-18 DIAGNOSIS — G89.29 CHRONIC PAIN OF LEFT KNEE: ICD-10-CM

## 2022-05-18 PROCEDURE — 97035 APP MDLTY 1+ULTRASOUND EA 15: CPT | Performed by: PHYSICAL THERAPIST

## 2022-05-18 PROCEDURE — 97140 MANUAL THERAPY 1/> REGIONS: CPT | Performed by: PHYSICAL THERAPIST

## 2022-05-18 NOTE — PROGRESS NOTES
Dx: Chronic pain of [address] (Authorized # of Visits):  5         Authorizing Physician: Dr. Lucy CARLSON visit: none scheduled  Fall Risk: standard         Precautions: n/a      DISCHARGE SUMMARY         Subjective: Patient reports she has continued with the HEP but still has the pain and difficulty straightening the knee. Pain continues side of knee, states still with have some buckling at times at that knee. Feels the outside of the leg and ITB feels looser. States will follow up with MD.    Pain: 2-4/10      Objective: see outpatient daily record. ROM at knee WNL  Lateral jt line tenderness  Catching when moving into end range extension with heel slides  Noted atrophy L to R quad region      Assessment: Patient has been seen for 9 visits with use of modalities, stretches and strengthening, along with taping but limited improvement has been seen. ITB is more flexible and medial glides of patella is no longer painful. However she continues with lateral joint line tenderness and difficulty fully straightening the knee and some c/o buckling. Further imaging may be indicated at this time as symptoms ongoing. Goals:   Goals: (to be met in 8-12 visits)  1. Patient to be independent with ongoing HEP- Met  2. Patient to be able to straighten L knee without pain. - not met  3. Patient to have improved strength L glut area by 1/2 to 1 mm grade for improved stability single leg L.- met  4. Patient to be able to return to all activities without pain at L lateral knee. - not met    Plan: Patient will follow up with MD, further imaging may be indicated at this time as symptoms ongoing. .  Date: 5/17/2022  TX#: 9   ultrasound x 8 min at 1.4 w/cm2 cont lateral knee area  ITB stretch x 2  Foam roller ITB area  IASMT ITB area  STM ITB  Medial glides patella  kinesiotape for patellar support  Patient independent with ongoing HEP. Daun Comber               HEP: as noted above    Charges: ultrasound, MM 1,  Total Timed Treatment: 30 min  Total Treatment Time: 30 min

## 2022-05-29 DIAGNOSIS — I10 ESSENTIAL HYPERTENSION: ICD-10-CM

## 2022-05-30 RX ORDER — LABETALOL 100 MG/1
100 TABLET, FILM COATED ORAL 2 TIMES DAILY
Qty: 180 TABLET | Refills: 1 | Status: SHIPPED | OUTPATIENT
Start: 2022-05-30

## 2022-05-30 NOTE — TELEPHONE ENCOUNTER
Refill passed per [facility] protocol   Requested Prescriptions   Pending Prescriptions Disp Refills    LABETALOL 100 MG Oral Tab [Pharmacy Med Name: LABETALOL  MG TABLET] 225 tablet 1     Sig: PLEASE SEE ATTACHED FOR DETAILED DIRECTIONS        Hypertensive Medications Protocol Passed - 5/29/2022  9:36 PM        Passed - CMP or BMP in past 12 months        Passed - Appointment in past 6 or next 3 months        Passed - GFR Non- > 50     Lab Results   Component Value Date    GFRNAA 79 11/24/2021

## 2022-06-26 ENCOUNTER — TELEPHONE (OUTPATIENT)
Dept: FAMILY MEDICINE CLINIC | Facility: CLINIC | Age: 32
End: 2022-06-26

## 2022-06-26 DIAGNOSIS — M25.561 CHRONIC PAIN OF RIGHT KNEE: Primary | ICD-10-CM

## 2022-06-26 DIAGNOSIS — G89.29 CHRONIC PAIN OF RIGHT KNEE: Primary | ICD-10-CM

## 2022-06-28 ENCOUNTER — HOSPITAL ENCOUNTER (OUTPATIENT)
Dept: MAMMOGRAPHY | Facility: HOSPITAL | Age: 32
Discharge: HOME | End: 2022-06-28
Attending: NURSE PRACTITIONER
Payer: COMMERCIAL

## 2022-06-28 ENCOUNTER — HOSPITAL ENCOUNTER (OUTPATIENT)
Dept: ULTRASOUND IMAGING | Facility: HOSPITAL | Age: 32
Discharge: HOME | End: 2022-06-28
Attending: NURSE PRACTITIONER
Payer: COMMERCIAL

## 2022-06-28 DIAGNOSIS — D24.2 FIBROADENOMA OF BREAST, LEFT: ICD-10-CM

## 2022-06-28 PROCEDURE — 76642 ULTRASOUND BREAST LIMITED: CPT | Performed by: NURSE PRACTITIONER

## 2022-06-28 PROCEDURE — 77062 BREAST TOMOSYNTHESIS BI: CPT | Performed by: NURSE PRACTITIONER

## 2022-06-28 PROCEDURE — 77066 DX MAMMO INCL CAD BI: CPT | Performed by: NURSE PRACTITIONER

## 2022-06-30 DIAGNOSIS — R92.8 ABNORMAL MAMMOGRAM: Primary | ICD-10-CM

## 2022-08-16 ENCOUNTER — OFFICE VISIT (OUTPATIENT)
Dept: FAMILY MEDICINE CLINIC | Facility: CLINIC | Age: 32
End: 2022-08-16
Payer: COMMERCIAL

## 2022-08-16 ENCOUNTER — TELEPHONE (OUTPATIENT)
Dept: INTERNAL MEDICINE CLINIC | Facility: HOSPITAL | Age: 32
End: 2022-08-16

## 2022-08-16 ENCOUNTER — LAB ENCOUNTER (OUTPATIENT)
Dept: LAB | Age: 32
End: 2022-08-16
Attending: PREVENTIVE MEDICINE
Payer: COMMERCIAL

## 2022-08-16 VITALS
TEMPERATURE: 98 F | HEIGHT: 65 IN | WEIGHT: 166 LBS | HEART RATE: 93 BPM | SYSTOLIC BLOOD PRESSURE: 133 MMHG | BODY MASS INDEX: 27.66 KG/M2 | DIASTOLIC BLOOD PRESSURE: 81 MMHG

## 2022-08-16 DIAGNOSIS — R30.0 DYSURIA: ICD-10-CM

## 2022-08-16 DIAGNOSIS — Z20.822 SUSPECTED 2019 NOVEL CORONAVIRUS INFECTION: ICD-10-CM

## 2022-08-16 DIAGNOSIS — J39.9 UPPER RESPIRATORY DISEASE: ICD-10-CM

## 2022-08-16 DIAGNOSIS — Z20.822 SUSPECTED 2019 NOVEL CORONAVIRUS INFECTION: Primary | ICD-10-CM

## 2022-08-16 DIAGNOSIS — N30.00 ACUTE CYSTITIS WITHOUT HEMATURIA: Primary | ICD-10-CM

## 2022-08-16 LAB
BILIRUBIN: NEGATIVE
GLUCOSE (URINE DIPSTICK): 100 MG/DL
KETONES (URINE DIPSTICK): NEGATIVE MG/DL
MULTISTIX LOT#: ABNORMAL NUMERIC
NITRITE, URINE: POSITIVE
PH, URINE: 6 (ref 4.5–8)
PROTEIN (URINE DIPSTICK): 30 MG/DL
SARS-COV-2 RNA RESP QL NAA+PROBE: NOT DETECTED
SPECIFIC GRAVITY: 1.02 (ref 1–1.03)
UROBILINOGEN,SEMI-QN: 1 MG/DL (ref 0–1.9)

## 2022-08-16 PROCEDURE — 3079F DIAST BP 80-89 MM HG: CPT | Performed by: PHYSICIAN ASSISTANT

## 2022-08-16 PROCEDURE — 99213 OFFICE O/P EST LOW 20 MIN: CPT | Performed by: PHYSICIAN ASSISTANT

## 2022-08-16 PROCEDURE — 81002 URINALYSIS NONAUTO W/O SCOPE: CPT | Performed by: PHYSICIAN ASSISTANT

## 2022-08-16 PROCEDURE — 3008F BODY MASS INDEX DOCD: CPT | Performed by: PHYSICIAN ASSISTANT

## 2022-08-16 PROCEDURE — 3075F SYST BP GE 130 - 139MM HG: CPT | Performed by: PHYSICIAN ASSISTANT

## 2022-08-16 RX ORDER — CIPROFLOXACIN 500 MG/1
500 TABLET, FILM COATED ORAL 2 TIMES DAILY
Qty: 10 TABLET | Refills: 0 | Status: SHIPPED | OUTPATIENT
Start: 2022-08-16 | End: 2022-08-21

## 2022-08-17 LAB — SARS-COV-2 RNA RESP QL NAA+PROBE: NOT DETECTED

## 2022-08-28 DIAGNOSIS — I10 ESSENTIAL HYPERTENSION: ICD-10-CM

## 2022-08-29 RX ORDER — AMLODIPINE BESYLATE 10 MG/1
10 TABLET ORAL DAILY
Qty: 90 TABLET | Refills: 1 | Status: SHIPPED | OUTPATIENT
Start: 2022-08-29

## 2022-08-30 NOTE — TELEPHONE ENCOUNTER
Please review; protocol failed/no protocol. Requested Prescriptions   Pending Prescriptions Disp Refills    AMLODIPINE 10 MG Oral Tab [Pharmacy Med Name: AMLODIPINE BESYLATE 10 MG TAB] 90 tablet 1     Sig: TAKE 1 TABLET BY MOUTH EVERY DAY        Hypertensive Medications Protocol Failed - 8/28/2022 12:21 AM        Failed - CMP or BMP in past 6 months     No results found for this or any previous visit (from the past 4392 hour(s)).               Passed - In person appointment in the past 12 or next 3 months       Recent Outpatient Visits              1 week ago Acute cystitis without hematuria    [facility] Merry Wang PA-C    Office Visit    3 months ago     [address] [place] [address] in [place], [place]    Office Visit    3 months ago     [address] [place] [address] in [place], [place]    Office Visit    3 months ago     [address] [place] [address] in [facility] 18    3 months ago     [address] [place] [address] in [place], [place]    Office Visit                 Passed - Last BP reading less than 140/90     BP Readings from Last 1 Encounters:  08/16/22 : 133/81                Passed - In person appointment or virtual visit in the past 6 months       Recent Outpatient Visits              1 week ago Acute cystitis without hematuria    [facility] Merry Wang PA-C    Office Visit    3 months ago     [address] [place] [address] in [facility] 18    3 months ago     [address] [place] [address] in [facility] 18    3 months ago     [address] [place] [address] in [facility] 18    3 months ago     [address] [place] [address] in [facility], [address] - GFR > 50     No results found for: [facility]                     Recent Outpatient Visits              1 week ago Acute cystitis without hematuria    [address] [facility] Paul Handy PA-C    Office Visit    3 months ago     [address] [place] [address] in [place], [place]    Office Visit    3 months ago     [address] [place] [address] in [facility] 18    3 months ago     [address] [place] [address] in [facility] 18    3 months ago     [address] [place] [address] in [place], [place]    Office Visit

## 2022-10-01 DIAGNOSIS — Z30.41 ENCOUNTER FOR SURVEILLANCE OF CONTRACEPTIVE PILLS: ICD-10-CM

## 2022-10-03 RX ORDER — NORGESTIMATE AND ETHINYL ESTRADIOL 7DAYSX3 LO
1 KIT ORAL DAILY
Qty: 84 TABLET | Refills: 3 | Status: SHIPPED | OUTPATIENT
Start: 2022-10-03

## 2022-10-04 NOTE — TELEPHONE ENCOUNTER
Please review. Protocol failed or has no protocol.     Requested Prescriptions   Pending Prescriptions Disp Refills    TRI-LO-OLEKSANDR 0.18/0.215/0.25 MG-25 MCG Oral Tab [Pharmacy Med Name: TRI-LO-OLEKSANDR TABLET] 84 tablet 1     Sig: TAKE 1 TABLET BY MOUTH EVERY DAY        Gynecology Medication Protocol Failed - 10/1/2022  3:13 AM        Failed - Pass dependent on manual look-up of last PAP and patient compliance with PAP follow up recommendations        Passed - In person appointment or virtual visit in the past 12 mos or appointment in next 3 mos       Recent Outpatient Visits              1 month ago Acute cystitis without hematuria    [address] [facility], PA-C    Office Visit    4 months ago     [facility] in [place], [place]    Office Visit    4 months ago     [facility] in [facility] 18    5 months ago     [facility] in [facility] 18    5 months ago     [facility] in [place], [place]    Office Visit                       Recent Outpatient Visits              1 month ago Acute cystitis without hematuria    [address] [facility], PA-C    Office Visit    4 months ago     [facility] in [facility] 18    4 months ago     [facility] in [facility] 18    5 months ago     [facility] in [facility] 18    5 months ago     [facility] in [place], [place]    Office Visit

## 2022-11-18 ENCOUNTER — IMMUNIZATION (OUTPATIENT)
Dept: LAB | Age: 32
End: 2022-11-18
Attending: EMERGENCY MEDICINE
Payer: COMMERCIAL

## 2022-11-18 DIAGNOSIS — Z23 NEED FOR VACCINATION: Primary | ICD-10-CM

## 2022-11-18 PROCEDURE — 90471 IMMUNIZATION ADMIN: CPT

## 2022-11-18 PROCEDURE — 90686 IIV4 VACC NO PRSV 0.5 ML IM: CPT

## 2022-11-26 DIAGNOSIS — I10 ESSENTIAL HYPERTENSION: ICD-10-CM

## 2022-11-26 NOTE — TELEPHONE ENCOUNTER
Protocol failed or has No Protocol, please review  Requested Prescriptions   Pending Prescriptions Disp Refills    LABETALOL 100 MG Oral Tab [Pharmacy Med Name: LABETALOL  MG TABLET] 180 tablet 1     Sig: TAKE 1 TABLET BY MOUTH TWICE A DAY       Hypertensive Medications Protocol Failed - 11/26/2022 12:19 AM        Failed - CMP or BMP in past 6 months     No results found for this or any previous visit (from the past 4392 hour(s)).             Failed - EGFRCR or GFRNAA > 50     GFR Evaluation            Passed - In person appointment in the past 12 or next 3 months     Recent Outpatient Visits              3 months ago Acute cystitis without hematuria    [address] [facility] [place] Sharon Lima PA-C    Office Visit    6 months ago     [facility] in [place], [place]    Office Visit    6 months ago     [facility] in [place], [place]    Office Visit    6 months ago     [facility] in [place], [place]    Office Visit    6 months ago     [facility] in [place], [place]    Office Visit          Future Appointments         Provider Department Appt Notes    In 4 days Sharon Lima PA-C [address] [place] Annual physical----last px 11/23/21               Passed - Last BP reading less than 140/90     BP Readings from Last 1 Encounters:  08/16/22 : 133/81              Passed - In person appointment or virtual visit in the past 6 months     Recent Outpatient Visits              3 months ago Acute cystitis without hematuria    [address] [facility] [place] Sharon Lima PA-C    Office Visit    6 months ago     [facility] in [place], [place]    Office Visit    6 months ago     [facility] in [facility] [address]    6 months ago     [facility] in [place], [place]    Office Visit 6 months ago     [facility] in [place], [place]    Office Visit          Future Appointments         Provider Department Appt Notes    In 4 days Thelma Millan PA-C [address] Family Medicine Annual physical----last px 11/23/21                  Future Appointments         Provider Department Appt Notes    In 4 days RENÉ [facility] Family Medicine Annual physical----last px 11/23/21          Recent Outpatient Visits              3 months ago Acute cystitis without hematuria    [address] [facility] Thelma Millan PA-C    Office Visit    6 months ago     [facility] in [place], [place]    Office Visit    6 months ago     [facility] in [place], [place]    Office Visit    6 months ago     [facility]    6 months ago     [facility] in [place], [place]    Office Visit

## 2022-11-28 RX ORDER — LABETALOL 100 MG/1
100 TABLET, FILM COATED ORAL 2 TIMES DAILY
Qty: 180 TABLET | Refills: 1 | Status: SHIPPED | OUTPATIENT
Start: 2022-11-28

## 2022-11-30 ENCOUNTER — LAB ENCOUNTER (OUTPATIENT)
Dept: LAB | Age: 32
End: 2022-11-30
Attending: PHYSICIAN ASSISTANT
Payer: COMMERCIAL

## 2022-11-30 ENCOUNTER — OFFICE VISIT (OUTPATIENT)
Dept: FAMILY MEDICINE CLINIC | Facility: CLINIC | Age: 32
End: 2022-11-30
Payer: COMMERCIAL

## 2022-11-30 VITALS
HEART RATE: 61 BPM | BODY MASS INDEX: 28.32 KG/M2 | SYSTOLIC BLOOD PRESSURE: 123 MMHG | DIASTOLIC BLOOD PRESSURE: 76 MMHG | WEIGHT: 170 LBS | HEIGHT: 65 IN

## 2022-11-30 DIAGNOSIS — Z00.00 ROUTINE GENERAL MEDICAL EXAMINATION AT A HEALTH CARE FACILITY: ICD-10-CM

## 2022-11-30 DIAGNOSIS — M25.562 ACUTE PAIN OF LEFT KNEE: ICD-10-CM

## 2022-11-30 DIAGNOSIS — E55.9 VITAMIN D DEFICIENCY: ICD-10-CM

## 2022-11-30 DIAGNOSIS — H91.92 HEARING LOSS OF LEFT EAR, UNSPECIFIED HEARING LOSS TYPE: ICD-10-CM

## 2022-11-30 DIAGNOSIS — Z00.00 ROUTINE GENERAL MEDICAL EXAMINATION AT A HEALTH CARE FACILITY: Primary | ICD-10-CM

## 2022-11-30 LAB
ALBUMIN SERPL-MCNC: 4.1 G/DL (ref 3.4–5)
ALBUMIN/GLOB SERPL: 1 {RATIO} (ref 1–2)
ALP LIVER SERPL-CCNC: 63 U/L
ALT SERPL-CCNC: 27 U/L
ANION GAP SERPL CALC-SCNC: 5 MMOL/L (ref 0–18)
AST SERPL-CCNC: 18 U/L (ref 15–37)
BASOPHILS # BLD AUTO: 0.07 X10(3) UL (ref 0–0.2)
BASOPHILS NFR BLD AUTO: 1.1 %
BILIRUB SERPL-MCNC: 0.5 MG/DL (ref 0.1–2)
BUN BLD-MCNC: 16 MG/DL (ref 7–18)
BUN/CREAT SERPL: 18.8 (ref 10–20)
CALCIUM BLD-MCNC: 9.9 MG/DL (ref 8.5–10.1)
CHLORIDE SERPL-SCNC: 100 MMOL/L (ref 98–112)
CHOLEST SERPL-MCNC: 223 MG/DL (ref ?–200)
CO2 SERPL-SCNC: 31 MMOL/L (ref 21–32)
CREAT BLD-MCNC: 0.85 MG/DL
DEPRECATED RDW RBC AUTO: 40.5 FL (ref 35.1–46.3)
EOSINOPHIL # BLD AUTO: 0.08 X10(3) UL (ref 0–0.7)
EOSINOPHIL NFR BLD AUTO: 1.2 %
ERYTHROCYTE [DISTWIDTH] IN BLOOD BY AUTOMATED COUNT: 12.4 % (ref 11–15)
FASTING PATIENT LIPID ANSWER: YES
FASTING STATUS PATIENT QL REPORTED: YES
GFR SERPLBLD BASED ON 1.73 SQ M-ARVRAT: 93 ML/MIN/1.73M2 (ref 60–?)
GLOBULIN PLAS-MCNC: 4 G/DL (ref 2.8–4.4)
GLUCOSE BLD-MCNC: 86 MG/DL (ref 70–99)
HCT VFR BLD AUTO: 41.6 %
HDLC SERPL-MCNC: 80 MG/DL (ref 40–59)
HGB BLD-MCNC: 13.4 G/DL
IMM GRANULOCYTES # BLD AUTO: 0.01 X10(3) UL (ref 0–1)
IMM GRANULOCYTES NFR BLD: 0.2 %
LDLC SERPL CALC-MCNC: 126 MG/DL (ref ?–100)
LYMPHOCYTES # BLD AUTO: 2.72 X10(3) UL (ref 1–4)
LYMPHOCYTES NFR BLD AUTO: 41.9 %
MCH RBC QN AUTO: 28.5 PG (ref 26–34)
MCHC RBC AUTO-ENTMCNC: 32.2 G/DL (ref 31–37)
MCV RBC AUTO: 88.3 FL
MONOCYTES # BLD AUTO: 0.36 X10(3) UL (ref 0.1–1)
MONOCYTES NFR BLD AUTO: 5.5 %
NEUTROPHILS # BLD AUTO: 3.25 X10 (3) UL (ref 1.5–7.7)
NEUTROPHILS # BLD AUTO: 3.25 X10(3) UL (ref 1.5–7.7)
NEUTROPHILS NFR BLD AUTO: 50.1 %
NONHDLC SERPL-MCNC: 143 MG/DL (ref ?–130)
OSMOLALITY SERPL CALC.SUM OF ELEC: 282 MOSM/KG (ref 275–295)
PLATELET # BLD AUTO: 292 10(3)UL (ref 150–450)
POTASSIUM SERPL-SCNC: 4.2 MMOL/L (ref 3.5–5.1)
PROT SERPL-MCNC: 8.1 G/DL (ref 6.4–8.2)
RBC # BLD AUTO: 4.71 X10(6)UL
SODIUM SERPL-SCNC: 136 MMOL/L (ref 136–145)
TRIGL SERPL-MCNC: 100 MG/DL (ref 30–149)
TSI SER-ACNC: 0.99 MIU/ML (ref 0.36–3.74)
VIT D+METAB SERPL-MCNC: 22.1 NG/ML (ref 30–100)
VLDLC SERPL CALC-MCNC: 18 MG/DL (ref 0–30)
WBC # BLD AUTO: 6.5 X10(3) UL (ref 4–11)

## 2022-11-30 PROCEDURE — 99395 PREV VISIT EST AGE 18-39: CPT | Performed by: PHYSICIAN ASSISTANT

## 2022-11-30 PROCEDURE — 36415 COLL VENOUS BLD VENIPUNCTURE: CPT

## 2022-11-30 PROCEDURE — 84443 ASSAY THYROID STIM HORMONE: CPT

## 2022-11-30 PROCEDURE — 80061 LIPID PANEL: CPT

## 2022-11-30 PROCEDURE — 3074F SYST BP LT 130 MM HG: CPT | Performed by: PHYSICIAN ASSISTANT

## 2022-11-30 PROCEDURE — 82306 VITAMIN D 25 HYDROXY: CPT

## 2022-11-30 PROCEDURE — 80053 COMPREHEN METABOLIC PANEL: CPT

## 2022-11-30 PROCEDURE — 85025 COMPLETE CBC W/AUTO DIFF WBC: CPT

## 2022-11-30 PROCEDURE — 3008F BODY MASS INDEX DOCD: CPT | Performed by: PHYSICIAN ASSISTANT

## 2022-11-30 PROCEDURE — 3078F DIAST BP <80 MM HG: CPT | Performed by: PHYSICIAN ASSISTANT

## 2022-12-05 ENCOUNTER — TELEPHONE (OUTPATIENT)
Dept: FAMILY MEDICINE CLINIC | Facility: CLINIC | Age: 32
End: 2022-12-05

## 2023-01-04 ENCOUNTER — OFFICE VISIT (OUTPATIENT)
Dept: FAMILY MEDICINE CLINIC | Facility: CLINIC | Age: 33
End: 2023-01-04
Payer: COMMERCIAL

## 2023-01-04 VITALS
HEART RATE: 75 BPM | DIASTOLIC BLOOD PRESSURE: 76 MMHG | RESPIRATION RATE: 16 BRPM | OXYGEN SATURATION: 98 % | WEIGHT: 171.19 LBS | SYSTOLIC BLOOD PRESSURE: 136 MMHG | TEMPERATURE: 99 F | HEIGHT: 65 IN | BODY MASS INDEX: 28.52 KG/M2

## 2023-01-04 DIAGNOSIS — J32.9 RHINOSINUSITIS: Primary | ICD-10-CM

## 2023-01-04 DIAGNOSIS — J31.0 RHINOSINUSITIS: Primary | ICD-10-CM

## 2023-01-04 DIAGNOSIS — R05.1 ACUTE COUGH: ICD-10-CM

## 2023-01-04 PROCEDURE — 3078F DIAST BP <80 MM HG: CPT

## 2023-01-04 PROCEDURE — 3008F BODY MASS INDEX DOCD: CPT

## 2023-01-04 PROCEDURE — 99213 OFFICE O/P EST LOW 20 MIN: CPT

## 2023-01-04 PROCEDURE — 3075F SYST BP GE 130 - 139MM HG: CPT

## 2023-01-04 RX ORDER — ALBUTEROL SULFATE 90 UG/1
2 AEROSOL, METERED RESPIRATORY (INHALATION) 4 TIMES DAILY PRN
Qty: 1 EACH | Refills: 0 | Status: SHIPPED | OUTPATIENT
Start: 2023-01-04 | End: 2024-01-04

## 2023-01-04 RX ORDER — BENZONATATE 200 MG/1
200 CAPSULE ORAL 3 TIMES DAILY PRN
Qty: 30 CAPSULE | Refills: 0 | Status: SHIPPED | OUTPATIENT
Start: 2023-01-04

## 2023-01-04 RX ORDER — CEFDINIR 300 MG/1
300 CAPSULE ORAL 2 TIMES DAILY
Qty: 20 CAPSULE | Refills: 0 | Status: SHIPPED | OUTPATIENT
Start: 2023-01-04 | End: 2023-01-14

## 2023-01-10 ENCOUNTER — TELEPHONE (OUTPATIENT)
Dept: INTERNAL MEDICINE CLINIC | Facility: HOSPITAL | Age: 33
End: 2023-01-10

## 2023-01-31 ENCOUNTER — HOSPITAL ENCOUNTER (OUTPATIENT)
Dept: ULTRASOUND IMAGING | Facility: HOSPITAL | Age: 33
Discharge: HOME | End: 2023-01-31
Attending: NURSE PRACTITIONER
Payer: COMMERCIAL

## 2023-01-31 DIAGNOSIS — R92.8 ABNORMAL MAMMOGRAM: ICD-10-CM

## 2023-01-31 PROCEDURE — 76642 ULTRASOUND BREAST LIMITED: CPT | Performed by: NURSE PRACTITIONER

## 2023-02-11 DIAGNOSIS — N63.10 MASS OF RIGHT BREAST, UNSPECIFIED QUADRANT: Primary | ICD-10-CM

## 2023-02-24 DIAGNOSIS — I10 ESSENTIAL HYPERTENSION: ICD-10-CM

## 2023-02-24 RX ORDER — AMLODIPINE BESYLATE 10 MG/1
TABLET ORAL
Qty: 90 TABLET | Refills: 1 | Status: SHIPPED | OUTPATIENT
Start: 2023-02-24

## 2023-02-24 NOTE — TELEPHONE ENCOUNTER
Refill passed per [facility] protocol    Requested Prescriptions   Pending Prescriptions Disp Refills    AMLODIPINE 10 MG Oral Tab [Pharmacy Med Name: AMLODIPINE BESYLATE 10 MG TAB] 90 tablet 1     Sig: TAKE 1 TABLET BY MOUTH EVERY DAY       Hypertensive Medications Protocol Passed - 2/24/2023 12:33 AM        Passed - In person appointment in the past 12 or next 3 months     Recent Outpatient Visits              1 month ago Rhinosinusitis    [facility], [address], [address], [address] [facility], APRN    Office Visit    2 months ago Routine general medical examination at a health care facility    [address], [place], [place]    Office Visit    6 months ago Acute cystitis without hematuria    [facility], [address], [place], [place]    Office Visit    9 months ago     [facility] in [facility], [address] [place] Visit    9 months ago     [facility] in [facility], [address] [facility]    Office Visit                      Passed - Last BP reading less than 140/90     BP Readings from Last 1 Encounters:  01/04/23 : 136/76              Passed - CMP or BMP in past 6 months     Recent Results (from the past 4392 hour(s))   COMP METABOLIC PANEL (14)    Collection Time: 11/30/22  1:45 PM   Result Value Ref Range    Glucose 86 70 - 99 mg/dL    Sodium 136 136 - 145 mmol/L    Potassium 4.2 3.5 - 5.1 mmol/L    Chloride 100 98 - 112 mmol/L    CO2 31.0 21.0 - 32.0 mmol/L    Anion Gap 5 0 - 18 mmol/L    BUN 16 7 - 18 mg/dL    Creatinine 0.85 0.55 - 1.02 mg/dL    BUN/CREA Ratio 18.8 10.0 - 20.0    Calcium, Total 9.9 8.5 - 10.1 mg/dL    Calculated Osmolality 282 275 - 295 mOsm/kg    eGFR-Cr 93 >=60 mL/min/1.73m2    ALT 27 13 - 56 U/L    AST 18 15 - 37 U/L    Alkaline Phosphatase 63 37 - 98 U/L    Bilirubin, Total 0.5 0.1 - 2.0 mg/dL    Total Protein 8.1 6.4 - 8.2 g/dL    Albumin 4.1 3.4 - 5.0 g/dL    Globulin  4.0 2.8 - 4.4 g/dL    A/G Ratio 1.0 1.0 - 2.0    Patient Fasting for CMP? Yes      *Note: Due to a large number of results and/or encounters for the requested time period, some results have not been displayed. A complete set of results can be found in Results Review.                Passed - In person appointment or virtual visit in the past 6 months     Recent Outpatient Visits              1 month ago Rhinosinusitis    [facility], [address], [address] Becka Alvarez, APRN    Office Visit    2 months ago Routine general medical examination at a health care facility    [address],[facility], [address], [place], [employer]    Office Visit    6 months ago Acute cystitis without hematuria    [address],[facility], [address], [place], [employer]    Office Visit    9 months ago     [facility] in [place], [place], [address] Visit    9 months ago     [facility] in [place], [place], [address] or GFRNAA > 50     GFR Evaluation  EGFRCR: 93 , resulted on 11/30/2022

## 2023-03-24 ENCOUNTER — OFFICE VISIT (OUTPATIENT)
Dept: FAMILY MEDICINE CLINIC | Facility: CLINIC | Age: 33
End: 2023-03-24

## 2023-03-24 ENCOUNTER — HOSPITAL ENCOUNTER (OUTPATIENT)
Dept: GENERAL RADIOLOGY | Age: 33
Discharge: HOME | End: 2023-03-24
Attending: PHYSICIAN ASSISTANT
Payer: COMMERCIAL

## 2023-03-24 VITALS
HEIGHT: 65 IN | BODY MASS INDEX: 28.32 KG/M2 | WEIGHT: 170 LBS | DIASTOLIC BLOOD PRESSURE: 85 MMHG | HEART RATE: 78 BPM | SYSTOLIC BLOOD PRESSURE: 117 MMHG

## 2023-03-24 DIAGNOSIS — M54.2 CERVICALGIA: ICD-10-CM

## 2023-03-24 DIAGNOSIS — M54.2 CERVICALGIA: Primary | ICD-10-CM

## 2023-03-24 PROCEDURE — 3074F SYST BP LT 130 MM HG: CPT | Performed by: PHYSICIAN ASSISTANT

## 2023-03-24 PROCEDURE — 72040 X-RAY EXAM NECK SPINE 2-3 VW: CPT | Performed by: PHYSICIAN ASSISTANT

## 2023-03-24 PROCEDURE — 3079F DIAST BP 80-89 MM HG: CPT | Performed by: PHYSICIAN ASSISTANT

## 2023-03-24 PROCEDURE — 99213 OFFICE O/P EST LOW 20 MIN: CPT | Performed by: PHYSICIAN ASSISTANT

## 2023-03-24 PROCEDURE — 3008F BODY MASS INDEX DOCD: CPT | Performed by: PHYSICIAN ASSISTANT

## 2023-04-03 ENCOUNTER — OFFICE VISIT (OUTPATIENT)
Dept: FAMILY MEDICINE CLINIC | Facility: CLINIC | Age: 33
End: 2023-04-03
Payer: COMMERCIAL

## 2023-04-03 VITALS
WEIGHT: 171 LBS | SYSTOLIC BLOOD PRESSURE: 137 MMHG | DIASTOLIC BLOOD PRESSURE: 88 MMHG | HEIGHT: 65 IN | OXYGEN SATURATION: 99 % | BODY MASS INDEX: 28.49 KG/M2 | RESPIRATION RATE: 16 BRPM | TEMPERATURE: 97 F | HEART RATE: 75 BPM

## 2023-04-03 DIAGNOSIS — Z87.898 HX OF WHEEZING: ICD-10-CM

## 2023-04-03 DIAGNOSIS — R05.9 COUGH, UNSPECIFIED TYPE: Primary | ICD-10-CM

## 2023-04-03 LAB
OPERATOR ID: NORMAL
POCT LOT NUMBER: NORMAL
RAPID SARS-COV-2 BY PCR: NOT DETECTED

## 2023-05-23 DIAGNOSIS — I10 ESSENTIAL HYPERTENSION: ICD-10-CM

## 2023-05-23 RX ORDER — LABETALOL 100 MG/1
100 TABLET, FILM COATED ORAL 2 TIMES DAILY
Qty: 180 TABLET | Refills: 3 | Status: SHIPPED | OUTPATIENT
Start: 2023-05-23

## 2023-05-23 NOTE — TELEPHONE ENCOUNTER
Refill passed per [facility] [place] protocol.     Requested Prescriptions   Pending Prescriptions Disp Refills    LABETALOL 100 MG Oral Tab [Pharmacy Med Name: LABETALOL  MG TABLET] 180 tablet 1     Sig: TAKE 1 TABLET BY MOUTH TWICE A DAY       Hypertensive Medications Protocol Passed - 5/23/2023 12:33 AM        Passed - In person appointment in the past 12 or next 3 months     Recent Outpatient Visits              1 month ago Cough, unspecified type    [facility], [facility], [address] Chanetta Carte, [employer]    Office Visit    2 months ago [address] [facility], [facility], [place], [employer]    Office Visit    4 months ago Rhinosinusitis    [address], [facility], [facility] [address], [address]  Karri Radha, APRN    Office Visit    5 months ago Routine general medical examination at a health care facility    [address],[facility], [address] [place] [address], [place] PA-C    Office Visit    9 months ago Acute cystitis without hematuria    [facility], [address], [place] RONI Leija    Office Visit                      Passed - Last BP reading less than 140/90     BP Readings from Last 1 Encounters:  04/03/23 : 137/88                Passed - CMP or BMP in past 6 months     Recent Results (from the past 4392 hour(s))   COMP METABOLIC PANEL (14)    Collection Time: 11/30/22  1:45 PM   Result Value Ref Range    Glucose 86 70 - 99 mg/dL    Sodium 136 136 - 145 mmol/L    Potassium 4.2 3.5 - 5.1 mmol/L    Chloride 100 98 - 112 mmol/L    CO2 31.0 21.0 - 32.0 mmol/L    Anion Gap 5 0 - 18 mmol/L    BUN 16 7 - 18 mg/dL    Creatinine 0.85 0.55 - 1.02 mg/dL    BUN/CREA Ratio 18.8 10.0 - 20.0    Calcium, Total 9.9 8.5 - 10.1 mg/dL    Calculated Osmolality 282 275 - 295 mOsm/kg    eGFR-Cr 93 >=60 mL/min/1.73m2    ALT 27 13 - 56 U/L    AST 18 15 - 37 U/L    Alkaline Phosphatase 63 37 - 98 U/L    Bilirubin, Total 0.5 0.1 - 2.0 mg/dL    Total Protein 8.1 6.4 - 8.2 g/dL    Albumin 4.1 3.4 - 5.0 g/dL    Globulin  4.0 2.8 - 4.4 g/dL    A/G Ratio 1.0 1.0 - 2.0    Patient Fasting for CMP? Yes      *Note: Due to a large number of results and/or encounters for the requested time period, some results have not been displayed. A complete set of results can be found in Results Review.                  Passed - In person appointment or virtual visit in the past 6 months     Recent Outpatient Visits              1 month ago Cough, unspecified type    [facility], [address], [employer] [address], [address] Merryl [place], [place]    Office Visit    2 months ago [address], [address], [place], [place]    Office Visit    4 months ago Rhinosinusitis    [facility], [address], [facility], [address] Luz [place], APRN    Office Visit    5 months ago Routine general medical examination at a health care facility    [facility], [address], [place], [place]    Office Visit    9 months ago Acute cystitis without hematuria    [facility] [address], [place] AndreRONI santoro    Office Visit                      Passed - EGFRCR or GFRNAA > 50     GFR Evaluation  EGFRCR: 93 , resulted on 11/30/2022                 Recent Outpatient Visits              1 month ago Cough, unspecified type    Gloria Colvin, [address], [facility] [address], [address] Mer[place], [place]    Office Visit    2 months ago [address], [address], [place], [place]    Office Visit    4 months ago Rhinosinusitis    [facility], [address], [place] [address], [address] Luz Quarry, APRN    Office Visit    5 months ago Routine general medical examination at a health care facility [facility], [address], [place], [place]    Office Visit    9 months ago Acute cystitis without hematuria    [facility] [address], [place], [place]    Office Visit

## 2023-08-22 DIAGNOSIS — I10 ESSENTIAL HYPERTENSION: ICD-10-CM

## 2023-08-23 RX ORDER — AMLODIPINE BESYLATE 10 MG/1
10 TABLET ORAL DAILY
Qty: 90 TABLET | Refills: 0 | Status: SHIPPED | OUTPATIENT
Start: 2023-08-23

## 2023-08-23 NOTE — TELEPHONE ENCOUNTER
Please review. Protocol failed / Has no protocol. Requested Prescriptions   Pending Prescriptions Disp Refills    AMLODIPINE 10 MG Oral Tab [Pharmacy Med Name: AMLODIPINE BESYLATE 10 MG TAB] 90 tablet 1     Sig: TAKE 1 TABLET BY MOUTH EVERY DAY       Hypertensive Medications Protocol Failed - 8/22/2023 12:38 AM        Failed - CMP or BMP in past 6 months     No results found for this or any previous visit (from the past 4392 hour(s)).             Passed - In person appointment in the past 12 or next 3 months     Recent Outpatient Visits              4 months ago Cough, unspecified type    [facility], [address], [address], [address] [place], [place]    Office Visit    5 months ago [facility], [address], [place], [place]    Office Visit    7 months ago Rhinosinusitis    [address], [address], [address], [address] [facility]    Office Visit    8 months ago Routine general medical examination at a [facility] facility    [address], [address], [place], [place]    Office Visit    1 year ago Acute cystitis without hematuria    [address], [address], [place] Pilot[place]I    Office Visit                      Passed - Last BP reading less than 140/90     BP Readings from Last 1 Encounters:  04/03/23 : 137/88              Passed - In person appointment or virtual visit in the past 6 months     Recent Outpatient Visits              4 months ago Cough, unspecified type    [address], [address], [address], [address] [place], [place]    Office Visit    5 months ago [facility], [address], [place], [place]    Office Visit    7 months ago [facility], [address], [place], Tommy Ramirez, APRN    Office Visit    8 months ago Routine general medical examination at a health care facility    [address] [facility], [place], [place]    Office Visit    1 year ago Acute cystitis without hematuria    [facility], [address], [place], [place]    Office Visit                      Passed - EGFRCR or GFRNAA > 50     GFR Evaluation  EGFRCR: 93 , resulted on 11/30/2022                Recent Outpatient Visits              4 months ago Cough, unspecified type    Mayra Wheeler, [facility] [employer] [address], Tony [place], [place]    Office Visit    5 months ago [address] [facility], [address] [place] [address], [place], [place]    Office Visit    7 months ago [address] [facility], [facility] [employer] [place] [address], Tony [place], [facility]    Office Visit    8 months ago Routine general medical examination at a health care facility    Mayra Wheeler, [address] [place] [address], [place], [place]    Office Visit    1 year ago Acute cystitis without hematuria    Mayra Wheeler, [address] [place] [address], [place], [place]    Office Visit

## 2023-09-24 ENCOUNTER — HOSPITAL ENCOUNTER (OUTPATIENT)
Age: 33
Discharge: HOME | End: 2023-09-24
Payer: COMMERCIAL

## 2023-09-24 ENCOUNTER — APPOINTMENT (OUTPATIENT)
Dept: GENERAL RADIOLOGY | Age: 33
End: 2023-09-24
Attending: PHYSICIAN ASSISTANT
Payer: COMMERCIAL

## 2023-09-24 VITALS
TEMPERATURE: 98 F | DIASTOLIC BLOOD PRESSURE: 87 MMHG | SYSTOLIC BLOOD PRESSURE: 127 MMHG | HEART RATE: 76 BPM | OXYGEN SATURATION: 98 % | RESPIRATION RATE: 18 BRPM
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
## 2023-09-24 DIAGNOSIS — S13.9XXA NECK SPRAIN, INITIAL ENCOUNTER: Primary | ICD-10-CM

## 2023-09-24 PROCEDURE — 72040 X-RAY EXAM NECK SPINE 2-3 VW: CPT | Performed by: PHYSICIAN ASSISTANT

## 2023-09-24 PROCEDURE — 99204 OFFICE O/P NEW MOD 45 MIN: CPT

## 2023-09-24 PROCEDURE — 99213 OFFICE O/P EST LOW 20 MIN: CPT

## 2023-09-24 RX ORDER — IBUPROFEN 600 MG/1
TABLET ORAL
Qty: 20 TABLET | Refills: 0 | Status: SHIPPED | OUTPATIENT
Start: 2023-09-24

## 2023-09-24 RX ORDER — LIDOCAINE 50 MG/G
1 PATCH TOPICAL EVERY 24 HOURS
Qty: 5 PATCH | Refills: 0 | Status: SHIPPED | OUTPATIENT
Start: 2023-09-24 | End: 2023-09-29

## 2023-09-24 RX ORDER — CYCLOBENZAPRINE HCL 10 MG
10 TABLET ORAL 3 TIMES DAILY PRN
Qty: 14 TABLET | Refills: 0 | Status: SHIPPED | OUTPATIENT
Start: 2023-09-24 | End: 2023-10-01
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
## 2023-10-27 NOTE — TELEPHONE ENCOUNTER

## 2023-11-07 ENCOUNTER — TELEPHONE (OUTPATIENT)
Dept: ORTHOPEDICS CLINIC | Facility: CLINIC | Age: 33
End: 2023-11-07

## 2023-11-07 DIAGNOSIS — M25.561 RIGHT KNEE PAIN, UNSPECIFIED CHRONICITY: Primary | ICD-10-CM

## 2023-11-07 DIAGNOSIS — Z01.89 ENCOUNTER FOR LOWER EXTREMITY COMPARISON IMAGING STUDY: ICD-10-CM

## 2023-11-07 NOTE — TELEPHONE ENCOUNTER
[facility] Dami Sevilla on 12/18/23 for Left knee MRI showed 8mm loose body, knee pain. No previous imaging taken or shown in epic. Please advise if imaging is needed prior.

## 2023-11-12 RX ORDER — ERGOCALCIFEROL 1.25 MG/1
50000 CAPSULE ORAL WEEKLY
Qty: 12 CAPSULE | Refills: 3 | OUTPATIENT
Start: 2023-11-12

## 2023-11-12 NOTE — TELEPHONE ENCOUNTER
Please review. Protocol failed / No Protocol.    Requested Prescriptions   Pending Prescriptions Disp Refills    ERGOCALCIFEROL 1.25 MG (36904 UT) Oral Cap [Pharmacy Med Name: VITAMIN D2 1.25MG(50,000 UNIT)] 12 capsule 3     Sig: TAKE 1 CAPSULE BY MOUTH ONE TIME PER WEEK       There is no refill protocol information for this order

## 2023-11-22 DIAGNOSIS — I10 ESSENTIAL HYPERTENSION: ICD-10-CM

## 2023-11-22 RX ORDER — AMLODIPINE BESYLATE 10 MG/1
10 TABLET ORAL DAILY
Qty: 90 TABLET | Refills: 0 | Status: SHIPPED | OUTPATIENT
Start: 2023-11-22

## 2023-11-22 NOTE — TELEPHONE ENCOUNTER
Please review. Protocol failed / Has no protocol. Requested Prescriptions   Pending Prescriptions Disp Refills    AMLODIPINE 10 MG Oral Tab [Pharmacy Med Name: AMLODIPINE BESYLATE 10 MG TAB] 90 tablet 0     Sig: TAKE 1 TABLET BY MOUTH EVERY DAY       Hypertensive Medications Protocol Failed - 11/22/2023 12:36 AM        Failed - CMP or BMP in past 6 months     No results found for this or any previous visit (from the past 4392 hour(s)).             Failed - In person appointment or virtual visit in the past 6 months     Recent Outpatient Visits              7 months ago Cough, unspecified type    [facility], [address], [facility] [address], [address] [facility], [place] [employer]    Office Visit    8 months ago [facility], [address] [facility], [place] DaisyBill Leak, [employer]    Office Visit    10 months ago Rhinosinusitis    Mayra Wheeler, [address], [facility] [address], [address] [facility], APRN    Office Visit    11 months ago Routine general medical examination at a health care facility    Mayra Wheeler, [address], [place] Teola Leak, [employer]    Office Visit    1 year ago Acute cystitis without hematuria    Mayra Wheeler, [address] [place] [address], [place]ak, PA-C    Office Visit          Future Appointments         Provider Department Appt Notes    In 1 week Destiny Mao 94, [place] Annual physical, labs    In 3 weeks 1 [facility] [address] [facility] [address]     In 3 weeks Duane Rayas, MD Juluis Andrea, [address], [place] Left knee MRI showed 8mm loose body, knee pain, ?surgery    In 3 weeks [facility] [address] [facility] - In person appointment in the past 12 or next 3 months     Recent Outpatient Visits              7 months ago Cough, unspecified type    [facility], [address], [place] [address], [address] [place], [place]    Office Visit    8 months ago [facility], [address], [place], [place]    Office Visit    10 months ago Rhinosinusitis    [address], [address], [place] [address], [address] [facility]    Office Visit    11 months ago Routine general medical examination at a health care facility    [address], [address], [place], [place]    Office Visit    1 year ago Acute cystitis without hematuria    [address], [address], [place], [place]    Office Visit          Future Appointments         Provider Department Appt Notes    In 1 week Destiny Suarez 94, [place] Annual physical, labs    In 3 weeks 1 [facility] [address] [facility]     In 3 weeks [facility], [address], [place] Left knee MRI showed 8mm loose body, knee pain, ?surgery    In 3 weeks [facility] [address] - [place]                Passed - Last BP reading less than 140/90     BP Readings from Last 1 Encounters:   09/24/23 127/87               Passed - EGFRCR or GFRNAA > 50     GFR Evaluation  EGFRCR: 93 , resulted on 11/30/2022             Future Appointments         Provider Department Appt Notes    In 1 week JAM Suarez [address] [facility], [place] Annual physical, labs    In 3 weeks 1 [facility] [address] [facility]     In 3 weeks [facility]ce Rivera [facility], [address], [place] Left knee MRI showed 8mm loose body, knee pain, ?surgery    In 3 weeks [facility] [address]            Recent Outpatient Visits              7 months ago Cough, unspecified type    [facility], [address], [place] [address], [address] Saúl Cosby PA-C    Office Visit    8 months ago [address], [address], [place], [place]    Office Visit    10 months ago Rhinosinusitis    [facility], [address] [facility]    Office Visit    11 months ago Routine general medical examination at a [facility] facility    [address], [address], [place], [place]    Office Visit    1 year ago Acute cystitis without hematuria    [facility], [address], [place], [place]    Office Visit

## 2023-11-29 ENCOUNTER — OFFICE VISIT (OUTPATIENT)
Dept: FAMILY MEDICINE CLINIC | Facility: CLINIC | Age: 33
End: 2023-11-29
Payer: COMMERCIAL

## 2023-11-29 ENCOUNTER — LAB ENCOUNTER (OUTPATIENT)
Dept: LAB | Age: 33
End: 2023-11-29
Attending: NURSE PRACTITIONER
Payer: COMMERCIAL

## 2023-11-29 VITALS
WEIGHT: 173 LBS | HEART RATE: 72 BPM | DIASTOLIC BLOOD PRESSURE: 88 MMHG | HEIGHT: 65 IN | BODY MASS INDEX: 28.82 KG/M2 | SYSTOLIC BLOOD PRESSURE: 128 MMHG

## 2023-11-29 DIAGNOSIS — M25.562 CHRONIC PAIN OF LEFT KNEE: Primary | ICD-10-CM

## 2023-11-29 DIAGNOSIS — Z30.41 ENCOUNTER FOR SURVEILLANCE OF CONTRACEPTIVE PILLS: ICD-10-CM

## 2023-11-29 DIAGNOSIS — G47.9 SLEEP DIFFICULTIES: ICD-10-CM

## 2023-11-29 DIAGNOSIS — E55.9 VITAMIN D DEFICIENCY: ICD-10-CM

## 2023-11-29 DIAGNOSIS — G89.29 CHRONIC PAIN OF LEFT KNEE: Primary | ICD-10-CM

## 2023-11-29 DIAGNOSIS — L68.0 HIRSUTISM: ICD-10-CM

## 2023-11-29 DIAGNOSIS — Z23 INFLUENZA VACCINE NEEDED: ICD-10-CM

## 2023-11-29 DIAGNOSIS — Z87.42 HISTORY OF IRREGULAR MENSTRUAL CYCLES: ICD-10-CM

## 2023-11-29 DIAGNOSIS — I10 ESSENTIAL HYPERTENSION: ICD-10-CM

## 2023-11-29 DIAGNOSIS — Z00.00 WELL ADULT EXAM: ICD-10-CM

## 2023-11-29 LAB
CHOLEST SERPL-MCNC: 223 MG/DL (ref ?–200)
FASTING PATIENT GLUCOSE ANSWER: YES
FASTING PATIENT LIPID ANSWER: YES
GLUCOSE BLD-MCNC: 109 MG/DL (ref 70–99)
HDLC SERPL-MCNC: 77 MG/DL (ref 40–59)
LDLC SERPL CALC-MCNC: 132 MG/DL (ref ?–100)
NONHDLC SERPL-MCNC: 146 MG/DL (ref ?–130)
TRIGL SERPL-MCNC: 79 MG/DL (ref 30–149)
VIT D+METAB SERPL-MCNC: 54.4 NG/ML (ref 30–100)
VLDLC SERPL CALC-MCNC: 14 MG/DL (ref 0–30)

## 2023-11-29 PROCEDURE — 90686 IIV4 VACC NO PRSV 0.5 ML IM: CPT | Performed by: NURSE PRACTITIONER

## 2023-11-29 PROCEDURE — 3079F DIAST BP 80-89 MM HG: CPT | Performed by: NURSE PRACTITIONER

## 2023-11-29 PROCEDURE — 82947 ASSAY GLUCOSE BLOOD QUANT: CPT

## 2023-11-29 PROCEDURE — 82306 VITAMIN D 25 HYDROXY: CPT

## 2023-11-29 PROCEDURE — 99395 PREV VISIT EST AGE 18-39: CPT | Performed by: NURSE PRACTITIONER

## 2023-11-29 PROCEDURE — 80061 LIPID PANEL: CPT

## 2023-11-29 PROCEDURE — 3074F SYST BP LT 130 MM HG: CPT | Performed by: NURSE PRACTITIONER

## 2023-11-29 PROCEDURE — 36415 COLL VENOUS BLD VENIPUNCTURE: CPT

## 2023-11-29 PROCEDURE — 90471 IMMUNIZATION ADMIN: CPT | Performed by: NURSE PRACTITIONER

## 2023-11-29 PROCEDURE — 3008F BODY MASS INDEX DOCD: CPT | Performed by: NURSE PRACTITIONER

## 2023-11-29 NOTE — ASSESSMENT & PLAN NOTE
Please aim to eat a diet high in fresh fruits and vegetables, lean protein sources, complex carbohydrates and limited processed and fast foods. Try to get at least 150 minutes of exercise per week-a combination of weight resistance and cardio is preferred.     Screening labs  Flu shot today  Will return for pap (on menses today)

## 2023-11-29 NOTE — ASSESSMENT & PLAN NOTE
Continue amlodipine and labetalol at hs  Check blood pressure twice per day with arm cuff bp monitor for 2 weeks  Record date, time, blood pressure and pulse reading  Send in copy of bp log to me via [employer] or you may call into nurse triage staff  Call if bp consistently > 140/86  Go to ER if any severe headache, chest pain, shortness of breath, visual changes  Please let me know if you have any questions or concerns.

## 2023-11-29 NOTE — ASSESSMENT & PLAN NOTE
Continue to work on sleep hygiene and symptoms have improved  Please call if symptoms worsen or are not resolving.

## 2023-12-14 ENCOUNTER — NURSE TRIAGE (OUTPATIENT)
Dept: FAMILY MEDICINE CLINIC | Facility: CLINIC | Age: 33
End: 2023-12-14

## 2023-12-14 NOTE — TELEPHONE ENCOUNTER
Action Requested: Summary for Provider     []  Critical Lab, Recommendations Needed  [] Need Additional Advice  []   FYI    []   Need Orders  [] Need Medications Sent to Pharmacy  []  Other     SUMMARY: appt made for tomorrow, woke up, wipe blood, have had cycle , no cramping or bleeding, only whn wiped-pinkish, no clots, wearing mini pad, advied if bleeding ,clotting, painful cramps go to ER, takes BCP    Reason for call: Vaginal Bleeding  Onset:today                   Reason for Disposition   Patient wants to be seen    Protocols used: Vaginal Bleeding - [id number]

## 2023-12-15 ENCOUNTER — OFFICE VISIT (OUTPATIENT)
Dept: FAMILY MEDICINE CLINIC | Facility: CLINIC | Age: 33
End: 2023-12-15
Payer: COMMERCIAL

## 2023-12-15 VITALS
BODY MASS INDEX: 28.82 KG/M2 | HEIGHT: 65 IN | WEIGHT: 173 LBS | DIASTOLIC BLOOD PRESSURE: 88 MMHG | HEART RATE: 61 BPM | SYSTOLIC BLOOD PRESSURE: 139 MMHG

## 2023-12-15 DIAGNOSIS — Z12.4 SCREENING FOR CERVICAL CANCER: ICD-10-CM

## 2023-12-15 DIAGNOSIS — N93.9 VAGINAL BLEEDING: Primary | ICD-10-CM

## 2023-12-15 DIAGNOSIS — Z01.419 ENCOUNTER FOR WELL WOMAN EXAM: ICD-10-CM

## 2023-12-15 LAB
APPEARANCE: CLEAR
BILIRUBIN: NEGATIVE
GLUCOSE (URINE DIPSTICK): NEGATIVE MG/DL
KETONES (URINE DIPSTICK): NEGATIVE MG/DL
LEUKOCYTES: NEGATIVE
MULTISTIX LOT#: ABNORMAL NUMERIC
NITRITE, URINE: NEGATIVE
PH, URINE: 7 (ref 4.5–8)
PROTEIN (URINE DIPSTICK): NEGATIVE MG/DL
SPECIFIC GRAVITY: 1.02 (ref 1–1.03)
URINE-COLOR: YELLOW
UROBILINOGEN,SEMI-QN: 0.2 MG/DL (ref 0–1.9)

## 2023-12-15 PROCEDURE — 99395 PREV VISIT EST AGE 18-39: CPT | Performed by: NURSE PRACTITIONER

## 2023-12-15 PROCEDURE — 81002 URINALYSIS NONAUTO W/O SCOPE: CPT | Performed by: NURSE PRACTITIONER

## 2023-12-15 PROCEDURE — 3008F BODY MASS INDEX DOCD: CPT | Performed by: NURSE PRACTITIONER

## 2023-12-15 PROCEDURE — 3079F DIAST BP 80-89 MM HG: CPT | Performed by: NURSE PRACTITIONER

## 2023-12-15 PROCEDURE — 99213 OFFICE O/P EST LOW 20 MIN: CPT | Performed by: NURSE PRACTITIONER

## 2023-12-15 PROCEDURE — 3075F SYST BP GE 130 - 139MM HG: CPT | Performed by: NURSE PRACTITIONER

## 2023-12-16 LAB
BV BACTERIA DNA VAG QL NAA+PROBE: NEGATIVE
C GLABRATA DNA VAG QL NAA+PROBE: NEGATIVE
C KRUSEI DNA VAG QL NAA+PROBE: NEGATIVE
CANDIDA DNA VAG QL NAA+PROBE: NEGATIVE
T VAGINALIS DNA VAG QL NAA+PROBE: NEGATIVE

## 2023-12-18 ENCOUNTER — HOSPITAL ENCOUNTER (OUTPATIENT)
Dept: GENERAL RADIOLOGY | Age: 33
Discharge: HOME | End: 2023-12-18
Attending: ORTHOPAEDIC SURGERY
Payer: COMMERCIAL

## 2023-12-18 ENCOUNTER — OFFICE VISIT (OUTPATIENT)
Dept: ORTHOPEDICS CLINIC | Facility: CLINIC | Age: 33
End: 2023-12-18
Payer: COMMERCIAL

## 2023-12-18 ENCOUNTER — TELEPHONE (OUTPATIENT)
Dept: ORTHOPEDICS CLINIC | Facility: CLINIC | Age: 33
End: 2023-12-18

## 2023-12-18 VITALS — WEIGHT: 173 LBS | HEIGHT: 65 IN | BODY MASS INDEX: 28.82 KG/M2

## 2023-12-18 DIAGNOSIS — Z96.652 CHRONIC KNEE PAIN AFTER TOTAL REPLACEMENT OF LEFT KNEE JOINT: Primary | ICD-10-CM

## 2023-12-18 DIAGNOSIS — M25.562 CHRONIC KNEE PAIN AFTER TOTAL REPLACEMENT OF LEFT KNEE JOINT: Primary | ICD-10-CM

## 2023-12-18 DIAGNOSIS — M23.42 LOOSE BODY IN KNEE, LEFT KNEE: ICD-10-CM

## 2023-12-18 DIAGNOSIS — M25.561 RIGHT KNEE PAIN, UNSPECIFIED CHRONICITY: ICD-10-CM

## 2023-12-18 DIAGNOSIS — M22.42 CHONDROMALACIA OF LEFT PATELLA: ICD-10-CM

## 2023-12-18 DIAGNOSIS — G89.29 CHRONIC KNEE PAIN AFTER TOTAL REPLACEMENT OF LEFT KNEE JOINT: Primary | ICD-10-CM

## 2023-12-18 DIAGNOSIS — Z01.89 ENCOUNTER FOR LOWER EXTREMITY COMPARISON IMAGING STUDY: ICD-10-CM

## 2023-12-18 LAB
C TRACH DNA SPEC QL NAA+PROBE: NEGATIVE
N GONORRHOEA DNA SPEC QL NAA+PROBE: NEGATIVE

## 2023-12-18 PROCEDURE — 73562 X-RAY EXAM OF KNEE 3: CPT | Performed by: ORTHOPAEDIC SURGERY

## 2023-12-18 PROCEDURE — 73564 X-RAY EXAM KNEE 4 OR MORE: CPT | Performed by: ORTHOPAEDIC SURGERY

## 2023-12-19 ENCOUNTER — TELEPHONE (OUTPATIENT)
Dept: ORTHOPEDICS CLINIC | Facility: CLINIC | Age: 33
End: 2023-12-19

## 2023-12-19 DIAGNOSIS — M22.42 CHONDROMALACIA OF LEFT PATELLA: Primary | ICD-10-CM

## 2023-12-19 DIAGNOSIS — M23.42 LOOSE BODY IN KNEE, LEFT KNEE: ICD-10-CM

## 2023-12-19 NOTE — TELEPHONE ENCOUNTER
Called and spoke with Kaya Rangel in regards to getting her scheduled for surgery. I did go over possible surgical dates. She has opted to proceed with surgery on 1/330/24. I did go over the surgical protocol and post op appt date and time. She states understanding with no questions or concerns.

## 2023-12-20 NOTE — TELEPHONE ENCOUNTER
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

## 2023-12-21 LAB
LAST PAP RESULT: NORMAL
PAP HISTORY (OTHER THAN LAST PAP): NORMAL

## 2023-12-27 ENCOUNTER — TELEPHONE (OUTPATIENT)
Dept: FAMILY MEDICINE CLINIC | Facility: CLINIC | Age: 33
End: 2023-12-27

## 2024-01-04 ENCOUNTER — PATIENT MESSAGE (OUTPATIENT)
Dept: ORTHOPEDICS CLINIC | Facility: CLINIC | Age: 34
End: 2024-01-04

## 2024-01-05 ENCOUNTER — EKG ENCOUNTER (OUTPATIENT)
Dept: LAB | Age: 34
End: 2024-01-05
Attending: PHYSICIAN ASSISTANT
Payer: COMMERCIAL

## 2024-01-05 ENCOUNTER — LAB ENCOUNTER (OUTPATIENT)
Dept: LAB | Age: 34
End: 2024-01-05
Attending: PHYSICIAN ASSISTANT
Payer: COMMERCIAL

## 2024-01-05 ENCOUNTER — OFFICE VISIT (OUTPATIENT)
Dept: FAMILY MEDICINE CLINIC | Facility: CLINIC | Age: 34
End: 2024-01-05
Payer: COMMERCIAL

## 2024-01-05 VITALS
WEIGHT: 174 LBS | HEART RATE: 62 BPM | HEIGHT: 65 IN | BODY MASS INDEX: 28.99 KG/M2 | SYSTOLIC BLOOD PRESSURE: 128 MMHG | DIASTOLIC BLOOD PRESSURE: 84 MMHG

## 2024-01-05 DIAGNOSIS — N92.6 IRREGULAR MENSES: ICD-10-CM

## 2024-01-05 DIAGNOSIS — Z01.818 PRE-OP EXAMINATION: ICD-10-CM

## 2024-01-05 DIAGNOSIS — Z01.818 PRE-OP EXAMINATION: Primary | ICD-10-CM

## 2024-01-05 DIAGNOSIS — Z00.00 ROUTINE GENERAL MEDICAL EXAMINATION AT A HEALTH CARE FACILITY: ICD-10-CM

## 2024-01-05 DIAGNOSIS — I10 ESSENTIAL HYPERTENSION: ICD-10-CM

## 2024-01-05 PROBLEM — Z01.419 WELL WOMAN EXAM WITH ROUTINE GYNECOLOGICAL EXAM: Status: RESOLVED | Noted: 2020-11-27 | Resolved: 2024-01-05

## 2024-01-05 PROBLEM — N93.9 VAGINAL BLEEDING: Status: RESOLVED | Noted: 2023-12-15 | Resolved: 2024-01-05

## 2024-01-05 PROBLEM — Z23 INFLUENZA VACCINE NEEDED: Status: RESOLVED | Noted: 2023-11-29 | Resolved: 2024-01-05

## 2024-01-05 PROBLEM — Z12.4 SCREENING FOR CERVICAL CANCER: Status: RESOLVED | Noted: 2023-12-15 | Resolved: 2024-01-05

## 2024-01-05 PROBLEM — Z01.419 ENCOUNTER FOR WELL WOMAN EXAM: Status: RESOLVED | Noted: 2023-12-15 | Resolved: 2024-01-05

## 2024-01-05 LAB
ALBUMIN SERPL-MCNC: 4.7 G/DL (ref 3.2–4.8)
ALBUMIN/GLOB SERPL: 1.3 {RATIO} (ref 1–2)
ALP LIVER SERPL-CCNC: 68 U/L
ALT SERPL-CCNC: 10 U/L
ANION GAP SERPL CALC-SCNC: 6 MMOL/L (ref 0–18)
AST SERPL-CCNC: 12 U/L (ref ?–34)
ATRIAL RATE: 60 BPM
BILIRUB SERPL-MCNC: 0.5 MG/DL (ref 0.3–1.2)
BILIRUB UR QL: NEGATIVE
BUN BLD-MCNC: 10 MG/DL (ref 9–23)
BUN/CREAT SERPL: 11.1 (ref 10–20)
CALCIUM BLD-MCNC: 9.8 MG/DL (ref 8.7–10.4)
CHLORIDE SERPL-SCNC: 101 MMOL/L (ref 98–112)
CLARITY UR: CLEAR
CO2 SERPL-SCNC: 30 MMOL/L (ref 21–32)
CREAT BLD-MCNC: 0.9 MG/DL
DEPRECATED RDW RBC AUTO: 40.1 FL (ref 35.1–46.3)
EGFRCR SERPLBLD CKD-EPI 2021: 87 ML/MIN/1.73M2 (ref 60–?)
ERYTHROCYTE [DISTWIDTH] IN BLOOD BY AUTOMATED COUNT: 12.5 % (ref 11–15)
FASTING STATUS PATIENT QL REPORTED: NO
GLOBULIN PLAS-MCNC: 3.6 G/DL (ref 2.8–4.4)
GLUCOSE BLD-MCNC: 94 MG/DL (ref 70–99)
GLUCOSE UR-MCNC: NORMAL MG/DL
HCT VFR BLD AUTO: 43.3 %
HGB BLD-MCNC: 14 G/DL
KETONES UR-MCNC: NEGATIVE MG/DL
LEUKOCYTE ESTERASE UR QL STRIP.AUTO: NEGATIVE
MCH RBC QN AUTO: 28.1 PG (ref 26–34)
MCHC RBC AUTO-ENTMCNC: 32.3 G/DL (ref 31–37)
MCV RBC AUTO: 86.9 FL
NITRITE UR QL STRIP.AUTO: NEGATIVE
OSMOLALITY SERPL CALC.SUM OF ELEC: 283 MOSM/KG (ref 275–295)
P AXIS: 28 DEGREES
P-R INTERVAL: 144 MS
PH UR: 6.5 [PH] (ref 5–8)
PLATELET # BLD AUTO: 290 10(3)UL (ref 150–450)
POTASSIUM SERPL-SCNC: 3.7 MMOL/L (ref 3.5–5.1)
PROT SERPL-MCNC: 8.3 G/DL (ref 5.7–8.2)
PROT UR-MCNC: NEGATIVE MG/DL
Q-T INTERVAL: 436 MS
QRS DURATION: 90 MS
QTC CALCULATION (BEZET): 436 MS
R AXIS: 76 DEGREES
RBC # BLD AUTO: 4.98 X10(6)UL
SODIUM SERPL-SCNC: 137 MMOL/L (ref 136–145)
SP GR UR STRIP: 1.01 (ref 1–1.03)
T AXIS: 29 DEGREES
TSI SER-ACNC: 0.91 MIU/ML (ref 0.55–4.78)
UROBILINOGEN UR STRIP-ACNC: NORMAL
VENTRICULAR RATE: 60 BPM
WBC # BLD AUTO: 7.5 X10(3) UL (ref 4–11)

## 2024-01-05 PROCEDURE — 99213 OFFICE O/P EST LOW 20 MIN: CPT | Performed by: PHYSICIAN ASSISTANT

## 2024-01-05 PROCEDURE — 80053 COMPREHEN METABOLIC PANEL: CPT

## 2024-01-05 PROCEDURE — 85027 COMPLETE CBC AUTOMATED: CPT

## 2024-01-05 PROCEDURE — 93005 ELECTROCARDIOGRAM TRACING: CPT

## 2024-01-05 PROCEDURE — 81001 URINALYSIS AUTO W/SCOPE: CPT

## 2024-01-05 PROCEDURE — 90715 TDAP VACCINE 7 YRS/> IM: CPT | Performed by: PHYSICIAN ASSISTANT

## 2024-01-05 PROCEDURE — 93010 ELECTROCARDIOGRAM REPORT: CPT | Performed by: INTERNAL MEDICINE

## 2024-01-05 PROCEDURE — 36415 COLL VENOUS BLD VENIPUNCTURE: CPT

## 2024-01-05 PROCEDURE — 3008F BODY MASS INDEX DOCD: CPT | Performed by: PHYSICIAN ASSISTANT

## 2024-01-05 PROCEDURE — 90471 IMMUNIZATION ADMIN: CPT | Performed by: PHYSICIAN ASSISTANT

## 2024-01-05 PROCEDURE — 84410 TESTOSTERONE BIOAVAILABLE: CPT

## 2024-01-05 PROCEDURE — 3074F SYST BP LT 130 MM HG: CPT | Performed by: PHYSICIAN ASSISTANT

## 2024-01-05 PROCEDURE — 3079F DIAST BP 80-89 MM HG: CPT | Performed by: PHYSICIAN ASSISTANT

## 2024-01-05 PROCEDURE — 84443 ASSAY THYROID STIM HORMONE: CPT

## 2024-01-05 RX ORDER — LABETALOL 100 MG/1
100 TABLET, FILM COATED ORAL NIGHTLY
Qty: 90 TABLET | Refills: 3 | Status: SHIPPED | OUTPATIENT
Start: 2024-01-05 | End: 2024-04-04

## 2024-01-05 RX ORDER — AMLODIPINE BESYLATE 10 MG/1
10 TABLET ORAL NIGHTLY
Qty: 90 TABLET | Refills: 3 | Status: SHIPPED | OUTPATIENT
Start: 2024-01-05 | End: 2024-04-04

## 2024-01-05 NOTE — PROGRESS NOTES
HPI:     HPI  33 year-old female is here in the office for Pre-Op. Patient is scheduled for left knee arthroscopy, removal of loose body and chondroplasty on 1/30/24 by Dr Brown.   Patient has a history of irregular menses. Patient is doing fine at this time.  Patient denies of chest pain, SOB, N/V/C/D, fever, dizziness, syncope, abdominal pain. There are no other concerns today.    Medications:     Current Outpatient Medications   Medication Sig Dispense Refill    labetalol 100 MG Oral Tab Take 1 tablet (100 mg total) by mouth nightly. 90 tablet 3    amLODIPine 10 MG Oral Tab Take 1 tablet (10 mg total) by mouth nightly. 90 tablet 3    Norgestim-Eth Estrad Triphasic (TRI-LO-OLEKSANDR) 0.18/0.215/0.25 MG-25 MCG Oral Tab Take 1 tablet by mouth daily. (Patient taking differently: Take 1 tablet by mouth nightly.) 84 tablet 1       Allergies:     Allergies   Allergen Reactions    Triaminic Night Time [Pedia Relief Cough-Cold] HIVES     Pt has taken similar medication as an adult without issue       History:     Health Maintenance   Topic Date Due    COVID-19 Vaccine (4 - 2023-24 season) 09/01/2023    Annual Depression Screening  01/01/2024    DTaP,Tdap,and Td Vaccines (1 - Tdap) 01/05/2025 (Originally 10/24/2009)    Annual Physical  12/15/2024    Pap Smear  12/15/2026    Influenza Vaccine  Completed    Pneumococcal Vaccine: Birth to 64yrs  Aged Out       Patient's last menstrual period was 12/24/2023 (exact date).   Past Medical History:     Past Medical History:   Diagnosis Date    High blood pressure     Hx of motion sickness     when in a moving vehicle    Vertigo     Visual impairment     contact lenses and glasses       Past Surgical History:     Past Surgical History:   Procedure Laterality Date    Needle biopsy left      Upper gi endoscopy,exam         Family History:     Family History   Problem Relation Age of Onset    Hypertension Father     Cancer Father         prostate    Hypertension Mother     Heart Disorder  Maternal Grandmother     Lung Disorder Maternal Grandmother     Diabetes Maternal Grandfather     Cancer Maternal Grandfather     Colon Cancer Maternal Grandfather     Heart Disorder Maternal Grandfather     Other (Other) Paternal Grandmother     Other (Other) Paternal Grandfather     Pancreatic Cancer Paternal Uncle     Breast Cancer Other         mat great aunt       Social History:     Social History     Socioeconomic History    Marital status:      Spouse name: Not on file    Number of children: Not on file    Years of education: Not on file    Highest education level: Not on file   Occupational History    Not on file   Tobacco Use    Smoking status: Never    Smokeless tobacco: Never   Vaping Use    Vaping Use: Never used   Substance and Sexual Activity    Alcohol use: Yes     Alcohol/week: 0.0 standard drinks of alcohol     Comment: socially    Drug use: No    Sexual activity: Not on file   Other Topics Concern    Not on file   Social History Narrative    Not on file     Social Determinants of Health     Financial Resource Strain: Not on file   Food Insecurity: Not on file   Transportation Needs: Not on file   Physical Activity: Not on file   Stress: Not on file   Social Connections: Not on file   Housing Stability: Not on file       Review of Systems:   Review of Systems   Constitutional: Negative.    HENT: Negative.     Eyes: Negative.    Respiratory: Negative.     Cardiovascular: Negative.    Gastrointestinal: Negative.    Genitourinary: Negative.    Musculoskeletal: Negative.    Skin: Negative.    Neurological: Negative.    Psychiatric/Behavioral: Negative.          Vitals:    01/05/24 1327 01/05/24 1355   BP: (!) 130/93 128/84   Pulse: 62    Weight: 174 lb (78.9 kg)    Height: 5' 5\" (1.651 m)      Body mass index is 28.96 kg/m².    Physical Exam:   Physical Exam  Vitals reviewed.   Constitutional:       Appearance: She is well-developed.   HENT:      Head: Normocephalic and atraumatic.      Right  Ear: Tympanic membrane, ear canal and external ear normal. There is no impacted cerumen.      Left Ear: Tympanic membrane, ear canal and external ear normal. There is no impacted cerumen.      Nose: Nose normal.      Mouth/Throat:      Mouth: Mucous membranes are moist.      Pharynx: Oropharynx is clear. No oropharyngeal exudate or posterior oropharyngeal erythema.   Eyes:      General:         Right eye: No discharge.         Left eye: No discharge.      Conjunctiva/sclera: Conjunctivae normal.   Cardiovascular:      Rate and Rhythm: Normal rate and regular rhythm.      Heart sounds: Normal heart sounds.   Pulmonary:      Effort: Pulmonary effort is normal.      Breath sounds: Normal breath sounds.   Abdominal:      General: Abdomen is flat. Bowel sounds are normal. There is no distension.      Palpations: Abdomen is soft.      Tenderness: There is no abdominal tenderness. There is no right CVA tenderness or left CVA tenderness.   Genitourinary:     Vagina: Normal.   Musculoskeletal:         General: Normal range of motion.      Cervical back: Normal range of motion and neck supple.   Skin:     Findings: No rash.   Neurological:      Mental Status: She is alert and oriented to person, place, and time.   Psychiatric:         Behavior: Behavior normal.         Thought Content: Thought content normal.         Judgment: Judgment normal.          Assessment and Plan::     Problem List Items Addressed This Visit          Cardiac and Vasculature    Essential hypertension    Relevant Medications    labetalol 100 MG Oral Tab    amLODIPine 10 MG Oral Tab     Other Visit Diagnoses       Pre-op examination    -  Primary    Relevant Orders    CBC, Platelet; No Differential (Completed)    Comp Metabolic Panel (14) (Completed)    TSH W Reflex To Free T4 (Completed)    Urinalysis with Culture Reflex (Completed)    EKG 12 Lead (Completed)    Patient is scheduled for left knee arthroscopy, removal of loose body and chondroplasty on  1/30/24 by Dr Brown.       Routine general medical examination at a health care facility        Relevant Orders    CBC, Platelet; No Differential (Completed)    Comp Metabolic Panel (14) (Completed)    TSH W Reflex To Free T4 (Completed)    Irregular menses        Relevant Medications            Other Relevant Orders    Insulin [E]    Testosterone,Free And Total [E]        Patient is cleared for surgery.

## 2024-01-05 NOTE — TELEPHONE ENCOUNTER
From: Kimi Mariscal  To: Nury Brown  Sent: 1/4/2024 5:05 PM CST  Subject: Question about time off post surgery     Hello, I'm in the process of requesting FMLA for my procedure. Knee scope removal of loose body, approximately how much time off do I request if you have any idea?   Also, what is your fax number. Thank you so much!

## 2024-01-06 ENCOUNTER — NURSE TRIAGE (OUTPATIENT)
Dept: FAMILY MEDICINE CLINIC | Facility: CLINIC | Age: 34
End: 2024-01-06

## 2024-01-06 NOTE — TELEPHONE ENCOUNTER
Action Requested: Summary for Provider     []  Critical Lab, Recommendations Needed  [] Need Additional Advice  [x]   FYI    []   Need Orders  [] Need Medications Sent to Pharmacy  []  Other     SUMMARY: Going to ER now    Reason for call: Stiff neck and neck pain and shoulder pain.   Onset: last night. Worse today.     Patient called office. Date of birth and full name both confirmed.   Asking if Tdap vaccine can cause stiff neck.   Stiff neck - worse than yesterday , even after taking a muscle relaxer.   Unable to touch chin to chest.   Denies shortness of breath, difficulty breathing, chest pain, chest pressure.   Triaged per protocol and advised care advice per protocol.   More details regarding Symptoms under Additional Documentation > Protocols Used.     Evaluation advised in ER now, she says her  will drive her now.     Future Appointments   Date Time Provider Department Center   2/8/2024 12:40 PM Jackelyn Holguin [facility]-C [id number]     Reason for Disposition   Patient sounds very sick or weak to the triager    Protocols used: Neck Pain or [id number]

## 2024-01-09 ENCOUNTER — TELEPHONE (OUTPATIENT)
Dept: FAMILY MEDICINE CLINIC | Facility: CLINIC | Age: 34
End: 2024-01-09

## 2024-01-09 NOTE — TELEPHONE ENCOUNTER
Per Chema Damon, patient is cleared for Surgery.   Clearance notes, lab results and EKG have been faxed over to Dr. Brown's office and P.A.T. faxed numbers: [phone] and [phone].    Waiting to receive fax confirmation.

## 2024-01-10 LAB
SEX HORM BIND GLOB: 93.5 NMOL/L
TESTOST % FREE+WEAK BND: 7.8 %
TESTOST FREE+WEAK BND: 2.8 NG/DL
TESTOSTERONE TOT /MS: 36.2 NG/DL

## 2024-01-11 ENCOUNTER — TELEPHONE (OUTPATIENT)
Dept: ORTHOPEDICS CLINIC | Facility: CLINIC | Age: 34
End: 2024-01-11

## 2024-01-11 NOTE — TELEPHONE ENCOUNTER
Received fax to be completed for pts STD. No RUIZ or fee has been completed. I have sent forms to forms dept via email and interoffice.

## 2024-01-19 NOTE — TELEPHONE ENCOUNTER
Medical Certification was received via fax, from [employer]. I have sent forms to be completed to the forms dept.

## 2024-01-26 NOTE — TELEPHONE ENCOUNTER
Per [employer] message on 1/18/24, forms can be uploaded to pt's EVaultt upon completion.

## 2024-01-29 NOTE — TELEPHONE ENCOUNTER
Jackelyn,    *The ACKNOWLEDGE button has been moved to the top right ribbon*    Please sign off on form if you agree to: Disab due to LEFT KNEE ARTHROSCOPY, REMOVAL OF LOOSE BODY AND CHONDROPLASTY. Start date 01/30/24-8-12 wks.  (place your signature on the first page only)    -From your Inbasket, Highlight the patient and click Chart   -Double click the 01/11/2024 Forms Completion telephone encounter  -Scroll down to the Media section   -Click the blue Hyperlink: Soni Obregondenver 01/29/24  -Click Acknowledge located in the top right ribbon/menu   -Drag the mouse into the blank space of the document and a + sign will appear. Left click to   electronically sign the document.     Thank you,    Edna

## 2024-01-30 ENCOUNTER — HOSPITAL ENCOUNTER (OUTPATIENT)
Facility: HOSPITAL | Age: 34
Setting detail: HOSPITAL OUTPATIENT SURGERY
Discharge: HOME | End: 2024-01-30
Attending: ORTHOPAEDIC SURGERY | Admitting: ORTHOPAEDIC SURGERY
Payer: COMMERCIAL

## 2024-01-30 ENCOUNTER — ANESTHESIA (OUTPATIENT)
Dept: SURGERY | Facility: HOSPITAL | Age: 34
End: 2024-01-30
Payer: COMMERCIAL

## 2024-01-30 ENCOUNTER — ANESTHESIA EVENT (OUTPATIENT)
Dept: SURGERY | Facility: HOSPITAL | Age: 34
End: 2024-01-30
Payer: COMMERCIAL

## 2024-01-30 VITALS
HEIGHT: 65 IN | HEART RATE: 66 BPM | SYSTOLIC BLOOD PRESSURE: 123 MMHG | DIASTOLIC BLOOD PRESSURE: 87 MMHG | WEIGHT: 178.56 LBS | OXYGEN SATURATION: 98 % | TEMPERATURE: 98 F | RESPIRATION RATE: 16 BRPM | BODY MASS INDEX: 29.75 KG/M2

## 2024-01-30 DIAGNOSIS — M23.42 LOOSE BODY IN KNEE, LEFT KNEE: ICD-10-CM

## 2024-01-30 DIAGNOSIS — Z98.890 STATUS POST ARTHROSCOPY OF LEFT KNEE: Primary | ICD-10-CM

## 2024-01-30 DIAGNOSIS — M22.42 CHONDROMALACIA OF LEFT PATELLA: ICD-10-CM

## 2024-01-30 LAB — B-HCG UR QL: NEGATIVE

## 2024-01-30 PROCEDURE — 0SCD4ZZ EXTIRPATION OF MATTER FROM LEFT KNEE JOINT, PERCUTANEOUS ENDOSCOPIC APPROACH: ICD-10-PCS | Performed by: ORTHOPAEDIC SURGERY

## 2024-01-30 PROCEDURE — 81025 URINE PREGNANCY TEST: CPT

## 2024-01-30 PROCEDURE — 0SBD4ZZ EXCISION OF LEFT KNEE JOINT, PERCUTANEOUS ENDOSCOPIC APPROACH: ICD-10-PCS | Performed by: ORTHOPAEDIC SURGERY

## 2024-01-30 PROCEDURE — 88304 TISSUE EXAM BY PATHOLOGIST: CPT | Performed by: ORTHOPAEDIC SURGERY

## 2024-01-30 RX ORDER — LIDOCAINE HYDROCHLORIDE 10 MG/ML
INJECTION, SOLUTION INFILTRATION; PERINEURAL AS NEEDED
Status: DISCONTINUED | OUTPATIENT
Start: 2024-01-30 | End: 2024-01-30

## 2024-01-30 RX ORDER — BUPIVACAINE HYDROCHLORIDE AND EPINEPHRINE 2.5; 5 MG/ML; UG/ML
INJECTION, SOLUTION EPIDURAL; INFILTRATION; INTRACAUDAL; PERINEURAL AS NEEDED
Status: DISCONTINUED | OUTPATIENT
Start: 2024-01-30 | End: 2024-01-30

## 2024-01-30 RX ORDER — ACETAMINOPHEN 500 MG
1000 TABLET ORAL ONCE AS NEEDED
Status: DISCONTINUED | OUTPATIENT
Start: 2024-01-30 | End: 2024-01-30

## 2024-01-30 RX ORDER — SODIUM CHLORIDE, SODIUM LACTATE, POTASSIUM CHLORIDE, CALCIUM CHLORIDE 600; 310; 30; 20 MG/100ML; MG/100ML; MG/100ML; MG/100ML
INJECTION, SOLUTION INTRAVENOUS CONTINUOUS
Status: DISCONTINUED | OUTPATIENT
Start: 2024-01-30 | End: 2024-01-30

## 2024-01-30 RX ORDER — ONDANSETRON 2 MG/ML
4 INJECTION INTRAMUSCULAR; INTRAVENOUS EVERY 6 HOURS PRN
Status: DISCONTINUED | OUTPATIENT
Start: 2024-01-30 | End: 2024-01-30

## 2024-01-30 RX ORDER — KETOROLAC TROMETHAMINE 30 MG/ML
INJECTION, SOLUTION INTRAMUSCULAR; INTRAVENOUS AS NEEDED
Status: DISCONTINUED | OUTPATIENT
Start: 2024-01-30 | End: 2024-01-30 | Stop reason: SURG

## 2024-01-30 RX ORDER — HYDROMORPHONE HYDROCHLORIDE 1 MG/ML
INJECTION, SOLUTION INTRAMUSCULAR; INTRAVENOUS; SUBCUTANEOUS
Status: COMPLETED
Start: 2024-01-30 | End: 2024-01-30

## 2024-01-30 RX ORDER — DEXAMETHASONE SODIUM PHOSPHATE 4 MG/ML
VIAL (ML) INJECTION AS NEEDED
Status: DISCONTINUED | OUTPATIENT
Start: 2024-01-30 | End: 2024-01-30 | Stop reason: SURG

## 2024-01-30 RX ORDER — HYDROCODONE BITARTRATE AND ACETAMINOPHEN 5; 325 MG/1; MG/1
2 TABLET ORAL ONCE AS NEEDED
Status: DISCONTINUED | OUTPATIENT
Start: 2024-01-30 | End: 2024-01-30

## 2024-01-30 RX ORDER — LIDOCAINE HYDROCHLORIDE 10 MG/ML
INJECTION, SOLUTION EPIDURAL; INFILTRATION; INTRACAUDAL; PERINEURAL AS NEEDED
Status: DISCONTINUED | OUTPATIENT
Start: 2024-01-30 | End: 2024-01-30 | Stop reason: SURG

## 2024-01-30 RX ORDER — ONDANSETRON 2 MG/ML
INJECTION INTRAMUSCULAR; INTRAVENOUS AS NEEDED
Status: DISCONTINUED | OUTPATIENT
Start: 2024-01-30 | End: 2024-01-30 | Stop reason: SURG

## 2024-01-30 RX ORDER — METOCLOPRAMIDE HYDROCHLORIDE 5 MG/ML
INJECTION INTRAMUSCULAR; INTRAVENOUS AS NEEDED
Status: DISCONTINUED | OUTPATIENT
Start: 2024-01-30 | End: 2024-01-30 | Stop reason: SURG

## 2024-01-30 RX ORDER — SCOLOPAMINE TRANSDERMAL SYSTEM 1 MG/1
1 PATCH, EXTENDED RELEASE TRANSDERMAL ONCE
Status: DISCONTINUED | OUTPATIENT
Start: 2024-01-30 | End: 2024-01-30

## 2024-01-30 RX ORDER — HYDROMORPHONE HYDROCHLORIDE 1 MG/ML
0.4 INJECTION, SOLUTION INTRAMUSCULAR; INTRAVENOUS; SUBCUTANEOUS EVERY 5 MIN PRN
Status: DISCONTINUED | OUTPATIENT
Start: 2024-01-30 | End: 2024-01-30

## 2024-01-30 RX ORDER — ACETAMINOPHEN AND CODEINE PHOSPHATE 300; 30 MG/1; MG/1
1 TABLET ORAL EVERY 6 HOURS PRN
Qty: 20 TABLET | Refills: 0 | Status: SHIPPED | OUTPATIENT
Start: 2024-01-30

## 2024-01-30 RX ORDER — ACETAMINOPHEN AND CODEINE PHOSPHATE 300; 30 MG/1; MG/1
1 TABLET ORAL ONCE
Status: DISCONTINUED | OUTPATIENT
Start: 2024-01-30 | End: 2024-01-30

## 2024-01-30 RX ORDER — CEFAZOLIN SODIUM IN 0.9 % NACL 3 G/100 ML
INTRAVENOUS SOLUTION, PIGGYBACK (ML) INTRAVENOUS
Status: DISCONTINUED
Start: 2024-01-30 | End: 2024-01-30

## 2024-01-30 RX ORDER — PROCHLORPERAZINE EDISYLATE 5 MG/ML
5 INJECTION INTRAMUSCULAR; INTRAVENOUS EVERY 8 HOURS PRN
Status: DISCONTINUED | OUTPATIENT
Start: 2024-01-30 | End: 2024-01-30

## 2024-01-30 RX ORDER — NALOXONE HYDROCHLORIDE 0.4 MG/ML
0.08 INJECTION, SOLUTION INTRAMUSCULAR; INTRAVENOUS; SUBCUTANEOUS AS NEEDED
Status: DISCONTINUED | OUTPATIENT
Start: 2024-01-30 | End: 2024-01-30

## 2024-01-30 RX ORDER — ACETAMINOPHEN AND CODEINE PHOSPHATE 300; 30 MG/1; MG/1
2 TABLET ORAL ONCE
Status: DISCONTINUED | OUTPATIENT
Start: 2024-01-30 | End: 2024-01-30

## 2024-01-30 RX ORDER — HYDROMORPHONE HYDROCHLORIDE 1 MG/ML
0.2 INJECTION, SOLUTION INTRAMUSCULAR; INTRAVENOUS; SUBCUTANEOUS EVERY 5 MIN PRN
Status: DISCONTINUED | OUTPATIENT
Start: 2024-01-30 | End: 2024-01-30

## 2024-01-30 RX ORDER — HYDROMORPHONE HYDROCHLORIDE 1 MG/ML
0.6 INJECTION, SOLUTION INTRAMUSCULAR; INTRAVENOUS; SUBCUTANEOUS EVERY 5 MIN PRN
Status: DISCONTINUED | OUTPATIENT
Start: 2024-01-30 | End: 2024-01-30

## 2024-01-30 RX ORDER — ACETAMINOPHEN 500 MG
1000 TABLET ORAL ONCE
Status: DISCONTINUED | OUTPATIENT
Start: 2024-01-30 | End: 2024-01-30 | Stop reason: HOSPADM

## 2024-01-30 RX ORDER — MORPHINE SULFATE 2 MG/ML
INJECTION, SOLUTION INTRAMUSCULAR; INTRAVENOUS AS NEEDED
Status: DISCONTINUED | OUTPATIENT
Start: 2024-01-30 | End: 2024-01-30

## 2024-01-30 RX ORDER — HYDROCODONE BITARTRATE AND ACETAMINOPHEN 5; 325 MG/1; MG/1
1 TABLET ORAL ONCE AS NEEDED
Status: DISCONTINUED | OUTPATIENT
Start: 2024-01-30 | End: 2024-01-30

## 2024-01-30 RX ORDER — CEFAZOLIN SODIUM/WATER 2 G/20 ML
2 SYRINGE (ML) INTRAVENOUS ONCE
Status: COMPLETED | OUTPATIENT
Start: 2024-01-30 | End: 2024-01-30

## 2024-01-30 RX ADMIN — LIDOCAINE HYDROCHLORIDE 50 MG: 10 INJECTION, SOLUTION EPIDURAL; INFILTRATION; INTRACAUDAL; PERINEURAL at 07:11:00

## 2024-01-30 RX ADMIN — KETOROLAC TROMETHAMINE 30 MG: 30 INJECTION, SOLUTION INTRAMUSCULAR; INTRAVENOUS at 08:09:00

## 2024-01-30 RX ADMIN — ONDANSETRON 4 MG: 2 INJECTION INTRAMUSCULAR; INTRAVENOUS at 08:09:00

## 2024-01-30 RX ADMIN — CEFAZOLIN SODIUM/WATER 2 G: 2 G/20 ML SYRINGE (ML) INTRAVENOUS at 07:15:00

## 2024-01-30 RX ADMIN — DEXAMETHASONE SODIUM PHOSPHATE 8 MG: 4 MG/ML VIAL (ML) INJECTION at 07:33:00

## 2024-01-30 RX ADMIN — METOCLOPRAMIDE HYDROCHLORIDE 10 MG: 5 INJECTION INTRAMUSCULAR; INTRAVENOUS at 07:33:00

## 2024-01-30 RX ADMIN — SODIUM CHLORIDE, SODIUM LACTATE, POTASSIUM CHLORIDE, CALCIUM CHLORIDE: 600; 310; 30; 20 INJECTION, SOLUTION INTRAVENOUS at 08:18:00

## 2024-01-30 NOTE — DISCHARGE INSTRUCTIONS
Post-operative Knee Arthroscopy    Medication: Before you leave the hospital, you will be given a prescription for a pain reliever. This medication contains a narcotic with acetaminophen (Tylenol), so do not take any additional Tylenol.    You may take Tylenol or Ibuprofen instead of the prescription as the pain subsides. If you take a daily Aspirin you may resume taking your Aspirin the evening of surgery.     Day of Surgery: When you return home, elevate the extremity on some pillows, if applicable.  Place an ice bag over the dressing for about 20 minutes three or four times a day for the first 5 days while protecting the skin with a sterile dressing plus Ace wrap (for knee).    Dressings: The dressing should remain in place for 48 hours. After 48 hours, remove the bulky Ace wrap and the gauze dressings. Apply a small amount of Betadine ointment to sutures and cover with a breathable band-aid.     Showering: Before showering, apply waterproof band-aids to sutures to ensure these areas do not get wet. Once finished, remove band-aids, let area air dry, and apply another small amount of Betadine ointment and regular band-aids. Do not immerse or soak the surgical wound (no baths). You may continue to use the Ace bandage if it makes you more comfortable.     Activity: You may walk on the leg as tolerated, unless instructed differently. You may use crutches or a cane as needed to minimize discomfort.     For knee arthroscopy: Flex and extend the knee, foot, and ankle to full range of motion as soon as possible to prevent stiffness.       Follow up: You will be scheduled for a follow up office visit in 7-10 days to have your sutures taken out. Your plan for physical therapy, driving, and returning to work will be discussed at this appointment.     Please don't hesitate to contact our office at [phone] if you have any post-operative questions or concerns.     You received a drug called Toradol which is an Anti  Inflammatory at: 8:00am  If you are allowed to take Anti inflammatories:    Do not take any Anti Inflammatory like Motrin, Aleve or Ibuprophen until after: 2:00pm  Please report any suspected allergic reactions or bleeding issues to your doctor

## 2024-01-30 NOTE — ANESTHESIA PREPROCEDURE EVALUATION
PRE-OP EVALUATION    Patient Name: Kimi Mariscal    Admit Diagnosis: Chondromalacia of left patella [M22.42]  Loose body in knee, left knee [M23.42]    Pre-op Diagnosis: Chondromalacia of left patella [M22.42]  Loose body in knee, left knee [M23.42]    LEFT KNEE ARTHROSCOPY, REMOVAL OF LOOSE BODY AND CHONDROPLASTY.    Anesthesia Procedure: LEFT KNEE ARTHROSCOPY, REMOVAL OF LOOSE BODY AND CHONDROPLASTY. (Left)    Surgeon(s) and Role:     * Nury Brown MD - Primary    Pre-op vitals reviewed.  Temp: 98.1 °F (36.7 °C)  Pulse: 78  Resp: 16  BP: 125/91  SpO2: 100 %  Body mass index is 29.72 kg/m².    Current medications reviewed.  Hospital Medications:   acetaminophen (Tylenol Extra Strength) tab 1,000 mg  1,000 mg Oral Once    scopolamine (Transderm-Scop) 1 MG/3DAYS patch 1 patch  1 patch Transdermal Once    lactated ringers infusion   Intravenous Continuous    ceFAZolin (Ancef) 2 g in 20mL IV syringe premix  2 g Intravenous Once    ceFAZolin in sodium chloride 0.9% (Ancef) 3-0.9 GM/100ML-% IVPB premix           Outpatient Medications:     Medications Prior to Admission   Medication Sig Dispense Refill Last Dose    labetalol 100 MG Oral Tab Take 1 tablet (100 mg total) by mouth nightly. 90 tablet 3 1/29/2024 at 2300    Norgestim-Eth Estrad Triphasic (TRI-LO-OLEKSANDR) 0.18/0.215/0.25 MG-25 MCG Oral Tab Take 1 tablet by mouth daily. (Patient taking differently: Take 1 tablet by mouth nightly.) 84 tablet 1 1/29/2024 at 2300    amLODIPine 10 MG Oral Tab Take 1 tablet (10 mg total) by mouth nightly. 90 tablet 3 1/28/2024 at 2300       Allergies: Triaminic night time [pedia relief cough-cold]      Anesthesia Evaluation    Patient summary reviewed.    Anesthetic Complications  (-) history of anesthetic complications         GI/Hepatic/Renal    Negative GI/hepatic/renal ROS.                             Cardiovascular    Negative cardiovascular ROS.  ECG reviewed.  Exercise tolerance: good     MET: >4      (+)  hypertension                                     Endo/Other    Negative endo/other ROS.                              Pulmonary    Negative pulmonary ROS.                       Neuro/Psych    Negative neuro/psych ROS.                                  Past Surgical History:   Procedure Laterality Date    NEEDLE BIOPSY LEFT      UPPER GI ENDOSCOPY,EXAM       Social History     Socioeconomic History    Marital status:    Tobacco Use    Smoking status: Never    Smokeless tobacco: Never   Vaping Use    Vaping Use: Never used   Substance and Sexual Activity    Alcohol use: Yes     Alcohol/week: 0.0 standard drinks of alcohol     Comment: socially    Drug use: No     History   Drug Use No     Available pre-op labs reviewed.  Lab Results   Component Value Date    WBC 7.5 01/05/2024    RBC 4.98 01/05/2024    HGB 14.0 01/05/2024    HCT 43.3 01/05/2024    MCV 86.9 01/05/2024    MCH 28.1 01/05/2024    MCHC 32.3 01/05/2024    RDW 12.5 01/05/2024    .0 01/05/2024     Lab Results   Component Value Date     01/05/2024    K 3.7 01/05/2024     01/05/2024    CO2 30.0 01/05/2024    BUN 10 01/05/2024    CREATSERUM 0.90 01/05/2024    GLU 94 01/05/2024    CA 9.8 01/05/2024            Airway      Mallampati: II  Mouth opening: 3 FB  TM distance: 4 - 6 cm  Neck ROM: full Cardiovascular    Cardiovascular exam normal.  Rhythm: regular  Rate: normal  (-) murmur   Dental             Pulmonary    Pulmonary exam normal.  Breath sounds clear to auscultation bilaterally.               Other findings              ASA: 2   Plan: general  NPO status verified and patient meets guidelines.  Patient has taken beta blockers in last 24 hours.  Post-procedure pain management plan discussed with surgeon and patient.      Plan/risks discussed with: patient  Use of blood product(s) discussed with: patient    Consented to blood products.          Present on Admission:  **None**

## 2024-01-30 NOTE — ANESTHESIA POSTPROCEDURE EVALUATION
[facility]    Kimi Mariscal Patient Status:  Hospital Outpatient Surgery   Age/Gender 33 year old female MRN [id number]   Location [facility] SURGERY Attending Nury Brown MD   Hosp Day # 0 [facility] Solo Damon PA-C       Anesthesia Post-op Note    LEFT KNEE ARTHROSCOPY, REMOVAL OF LOOSE BODY, CHONDROPLASTY, PARTIAL SYNOVECTOMY    Procedure Summary       Date: 01/30/24 Room / Location:  MAIN OR 14 /  MAIN OR    Anesthesia Start: 0704 Anesthesia Stop:     Procedure: LEFT KNEE ARTHROSCOPY, REMOVAL OF LOOSE BODY, CHONDROPLASTY, PARTIAL SYNOVECTOMY (Left: Knee) Diagnosis:       Chondromalacia of left patella      Loose body in knee, left knee      (Chondromalacia of left patella [M22.42]Loose body in knee, left knee [M23.42])    Surgeons: Nury Brown MD Anesthesiologist: Susan Smith DO    Anesthesia Type: general ASA Status: 2            Anesthesia Type: general    Vitals Value Taken Time   /92 01/30/24 0818   Temp 99.1 °F (37.3 °C) 01/30/24 0818   Pulse 88 01/30/24 0818   Resp 16 01/30/24 0818   SpO2 98 % 01/30/24 0818       Patient Location: PACU    Anesthesia Type: general    Airway Patency: patent    Postop Pain Control: adequate    Mental Status: mildly sedated but able to meaningfully participate in the post-anesthesia evaluation    Nausea/Vomiting: none    Cardiopulmonary/Hydration status: stable euvolemic    Complications: no apparent anesthesia related complications    Postop vital signs: stable    Dental Exam: Unchanged from Preop    Patient to be discharged from PACU when criteria met.

## 2024-01-30 NOTE — ANESTHESIA PROCEDURE NOTES
Airway  Date/Time: 1/30/2024 7:13 AM  Urgency: elective    Airway not difficult    General Information and Staff    Patient location during procedure: OR  Anesthesiologist: Susan Smith DO  Performed: anesthesiologist   Performed by: Susan Smith DO  Authorized by: Susan Smith DO      Indications and Patient Condition  Indications for airway management: anesthesia  Spontaneous ventilation: present  Sedation level: deep  Preoxygenated: yes  Patient position: sniffing  Mask difficulty assessment: 1 - vent by mask    Final Airway Details  Final airway type: supraglottic airway      Successful airway: classic  Size 4       Number of attempts at approach: 1  Ventilation between attempts: none  Number of other approaches attempted: 0

## 2024-01-30 NOTE — OPERATIVE REPORT
[facility]    PATIENT'S NAME: MAHAMED GARCIA   ATTENDING PHYSICIAN: Nury Brown M.D.   OPERATING PHYSICIAN: Nury Brown M.D.   PATIENT ACCOUNT#:   [id number]    LOCATION:  [facility] [place], [place] 12 [facility] 10  MEDICAL RECORD #:   [id number]       YOB: 1990  ADMISSION DATE:       01/30/2024      OPERATION DATE:  01/30/2024    OPERATIVE REPORT    PREOPERATIVE DIAGNOSIS:  Left knee synovial loose body.  POSTOPERATIVE DIAGNOSIS:    1.   Left knee infrapatellar synovial loose body.  2.   Chondromalacia, inferolateral patella.  3.   Hypertrophic infrapatellar synovitis.  PROCEDURE:    1.   Left knee arthroscopy with removal of synovial loose body.   2.   Chondroplasty, patella.  3.   Partial synovectomy and excision of inferolateral hypertrophic fat pad.    ASSISTANT:  Jackelyn Holguin PA-C, and SHAWNEE Benitez student.     ANESTHESIA:  General.    COMPLICATIONS:  None.    DRAINS:  None.    SPECIMENS:  Left knee synovial loose body.    CONDITION:  Stable.    BLOOD LOSS:  10 mL.    TOURNIQUET TIME:  Less than 1 hour.    INDICATIONS:  The patient is a 33-year-old healthy female who has struggled with intermittent pain at the inferolateral aspect of her left knee.  No recent injury is reported, but she has had episodes of significant pain that would flare up in a fairly consistent location inferolaterally.  There is a history of MRI obtained in June 2022 identifying a possible inferolateral loose body.  Symptoms progressed to bother her during ADLs and normal gait, and, therefore, she presented for definitive treatment options.  After reviewing the imaging studies, I suggested that there was a clear loose body inferolaterally which correlated with her location of pain.  Conservative versus arthroscopic treatment options were discussed, and she elected to proceed with definitive management with removal.  Risks, benefits, and alternatives to surgery were discussed including, but not limited to,  possible infection, bleeding, neurovascular injury, as well as postop stiffness, continued pain, and failed improvement following surgery.  Risks of anesthesia were also discussed including, but not limited to, possible cardiac, pulmonary, or cerebrovascular complications, any of which could be serious.  Given the patient's relatively stable health, these risks were felt to be low but not zero.  She expressed understanding and a desire to proceed.  Informed consent was obtained.    OPERATIVE TECHNIQUE:  The patient was identified in the preop holding area where the left lower extremity was initialed by me.  She was taken to the operating room and placed supine on the operating table with all bony prominences well padded.  After successful induction of general LMA anesthesia, the left lower extremity was placed in a well-padded proximal thigh tourniquet preset to 250 mmHg.  Contralateral leg was stabilized in lower leg bone foam cradle with all bony prominences well padded in slight hip flexion and 70 degrees of knee flexion.  A sequential compression device was placed on the lower calf.  Left leg was then prepped and draped in the usual sterile fashion to the proximal thigh.    After confirming consent, side, prophylactic antibiotics with an appropriate time-out, the left lower extremity was exsanguinated with an Esmarch and the tourniquet was elevated.  Standard diagnostic arthroscopy was initiated at the anterolateral portal site through a horizontal stab incision made with an 11-blade.  Semi-blunt entry into the patellofemoral pouch revealed intact-appearing articular cartilage of the patella at its apex, medial and lateral facets.  Arthroscope was swept medially and no significant trochlear wear was seen.  Mild lateral tracking of the patella was suspected.  Medial gutter was inspected and free of loose bodies or osteophytes.  Medial compartment was inspected after establishing a medial working portal with  spinal needle localization.  Meticulous probing confirmed no significant pathology at the medial meniscus or articular surfaces.  Intercondylar notch was inspected demonstrating normal-appearing ACL and PCL.  These were probed and felt to be intact.  Lateral compartment was inspected, and there was no evidence of high-grade chondral wear or lateral meniscal tear.  Popliteus was intact.  There was fairly significant inferolateral hypertrophic synovium somewhat obstructing view into the lateral gutter.  Arthroscope was, therefore, switched to the medial portal looking laterally.  We were able to assess the inferolateral patellofemoral articulation where there was hypertrophic fat which required synovectomy.  Excision of the hypertrophic synovitis was performed with a 4 mm shaver.  As we were excising some of the inferolateral patellofemoral fat, we encountered a synovial loose body measuring about 8 to 10 mm.  This was enucleated with the shaver until a small stalk remained.  I was then able to use the grasper to remove the loose body and mass from the lateral portal which was slightly enlarged with a hemostats at the capsular level for smooth removal.  It was placed in a specimen cup and sent to Pathology given that it did not appear to be a typical osteochondral loose body but had some vascular semisolid components to it.  The surrounding and investing infrapatellar fat was excised to prevent inferolateral fat pad impingement.  Gentle chondroplasty was then performed at the inferolateral aspect of the patella where there was clear grade 2 to grade 3 chondromalacia adjacent to the location of the loose body.  Trochlea was essentially unscathed.  Lateral gutter was free of loose bodies and was now better visualized after excising some of the synovitis.  Final photographs were obtained, and all 3 compartments were evacuated of arthroscopic debris.  An equal mixture of 1% Xylocaine and 0.25% Marcaine mixed with 2 mg of  Duramorph was infused into the knee for postop pain control.  The patient tolerated the procedure well with no evidence of intraoperative complication.  Portals were reapproximated with 3-0 nylon in a simple fashion followed by application of a sterile nonadhesive dressing.  This included Betadine ointment, Adaptic, fluffs, ABDs, and sterile Webril.  A compressive 6-inch Ace was applied.    Dedicated assistance from Jackelyn Holguin PA-C, and Libby Tee, PA student, was paramount for patient positioning, leg holding, and assistance with activating the motorized shaver.  They also performed wound closure and application of the dressing.  The patient was taken to the postanesthesia recovery room in stable condition.  All sponge, needle, and instrument counts were correct.    Dictated By Nury Brown M.D.  d: 01/30/2024 08:41:42  t: 01/30/2024 09:29:48  Job [id number]  NICOLE/

## 2024-01-30 NOTE — H&P
Orthopaedic H&P Update    H&P performed by Sammy Damon PA-C on 1/5/24 was reviewed by Nury Brown MD on 1/30/2024, the patient was examined and no significant changes have occurred in the patient's condition since the H&P was performed.  Risks and benefits were discussed, proceed with procedure as planned.      Nury Brown MD

## 2024-01-30 NOTE — BRIEF OP NOTE
Pre-Operative Diagnosis: Chondromalacia of left patella [M22.42]  Loose body in knee, left knee [M23.42]     Post-Operative Diagnosis: Chondromalacia of left patella [M22.42]Loose body in knee, left knee [M23.42]      Procedure Performed:   LEFT KNEE ARTHROSCOPY, REMOVAL OF LOOSE BODY, CHONDROPLASTY, PARTIAL SYNOVECTOMY    Surgeon(s) and Role:     * Nury Bronw MD - Primary    Assistant(s):  PA: Jackelyn Holguin PA-C and Libby ESPARZAS     Surgical Findings: see operative report      Specimen: left knee synovial loose body     Estimated Blood Loss: 10cc      Jackelyn Holguin PA-C  1/30/2024  8:15 AM

## 2024-01-30 NOTE — OR NURSING
Dr. Smith made aware diastolic BP mid to upper 90's, latest 101. Patient is asymptomatic. States she has not taken her norvasc x2 days. Ok to discharge.

## 2024-02-08 ENCOUNTER — OFFICE VISIT (OUTPATIENT)
Dept: ORTHOPEDICS CLINIC | Facility: CLINIC | Age: 34
End: 2024-02-08
Payer: COMMERCIAL

## 2024-02-08 VITALS — BODY MASS INDEX: 29.66 KG/M2 | WEIGHT: 178 LBS | HEIGHT: 65 IN

## 2024-02-08 DIAGNOSIS — Z98.890 STATUS POST ARTHROSCOPY OF LEFT KNEE: ICD-10-CM

## 2024-02-08 DIAGNOSIS — M25.062 HEMARTHROSIS OF LEFT KNEE: Primary | ICD-10-CM

## 2024-02-08 DIAGNOSIS — Z48.89 AFTERCARE FOLLOWING SURGERY: ICD-10-CM

## 2024-02-08 PROCEDURE — 20610 DRAIN/INJ JOINT/BURSA W/O US: CPT | Performed by: PHYSICIAN ASSISTANT

## 2024-02-08 PROCEDURE — 99024 POSTOP FOLLOW-UP VISIT: CPT | Performed by: PHYSICIAN ASSISTANT

## 2024-02-08 NOTE — PROGRESS NOTES
[facility] Post Op Progress Note    Date of Surgery: 01/30/2024      Subjective: Kimi Mariscal is a 33 year old female who is here for follow-up of her left knee.  She underwent left knee arthroscopy with removal of the synovial loose body, chondroplasty patella and partial synovectomy approximately 9 days ago.  She was doing well over the weekend but pain and swelling worsened 3 days ago.  She states that she was doing a little bit more stretching but otherwise no significant activity.  Currently, she notes pain in the back of the knee as well as tightness and swelling with movement.  She also noticed a rash that started yesterday.  It does travel into her medial thigh and she notes redness around the portals.  She denies fever or chills.  She recently discontinued taking a baby aspirin daily.  She has been taking the Tylenol with codeine since earlier in the week due to pain.      Objective: Exam of the left knee and lower extremity reveals that the portals are clean, dry, and intact.  She does have erythema surrounding the portals that may suggest a hypersensitivity reaction to the Betadine ointment.  She does also have a slightly raised rash to the medial thigh.  She has a moderate to significant effusion.  She lacks 10 degrees of full extension and flexion to 90 degrees with discomfort.  Sensation is present to light touch.  No calf tenderness upon palpation.  No calf swelling or erythema.      Assessment: Left knee hemarthrosis and hypersensitivity skin rash status post arthroscopy with removal of synovial loose body, chondroplasty patella and partial synovectomy      Plan: Arthroscopic pictures and surgical findings were discussed today.  There was a synovial loose body that was removed and sent to pathology.  This was consistent with a hemangioma which I explained is a benign collection of cells.  Unfortunately, it does appear that she has developed a hemarthrosis over the last few days.  I discussed  treatment options including continued conservative management with compression, ice and heat alternating, and elevation and taking it easy.  We also aspirating of the hemarthrosis to progress her symptoms faster.  She would like to try this today.  She will apply hydrocortisone cream to the rash in her thigh and discontinue the Betadine ointment as this may be a hypersensitivity reaction.  She will follow-up with me in 2 weeks.  At that time, we can consider formal therapy if she has stiffness but overall is doing better.  Wound care was discussed and she will avoid submersion of the wounds.  She will follow-up with me sooner with worsening symptoms, questions or concerns.    Procedure: Risk, benefits, and alternatives to the aspiration including but not limited to risk of needle infection, flareup, and failure to improve was discussed.  She would like to proceed under meticulous sterile technique, 4 cc Xylocaine was used to anesthetize the lateral patellofemoral joint of the left knee.  Then through an 18-gauge needle, 30cc of blood was aspirated.  A sterile compression dressing was applied including nonadhesive gauze, ABD and Ace wrap.  She will follow-up with me with any adverse reactions.      Jackelyn Holguin, [facility], PA-C Orthopedic Surgery   [facility]  [address] [place] [id number]   t: [phone]  f: [phone]

## 2024-02-14 ENCOUNTER — HOSPITAL ENCOUNTER (EMERGENCY)
Facility: HOSPITAL | Age: 34
Discharge: HOME | End: 2024-02-14
Attending: EMERGENCY MEDICINE
Payer: COMMERCIAL

## 2024-02-14 ENCOUNTER — APPOINTMENT (OUTPATIENT)
Dept: ULTRASOUND IMAGING | Facility: HOSPITAL | Age: 34
End: 2024-02-14
Attending: EMERGENCY MEDICINE
Payer: COMMERCIAL

## 2024-02-14 ENCOUNTER — HOSPITAL ENCOUNTER (OUTPATIENT)
Age: 34
Discharge: ANOTHER HOSPITAL | End: 2024-02-14
Payer: COMMERCIAL

## 2024-02-14 VITALS
HEIGHT: 65 IN | OXYGEN SATURATION: 99 % | RESPIRATION RATE: 20 BRPM | TEMPERATURE: 98 F | WEIGHT: 170 LBS | HEART RATE: 89 BPM | SYSTOLIC BLOOD PRESSURE: 126 MMHG | DIASTOLIC BLOOD PRESSURE: 91 MMHG | BODY MASS INDEX: 28.32 KG/M2

## 2024-02-14 VITALS
HEART RATE: 117 BPM | RESPIRATION RATE: 20 BRPM | TEMPERATURE: 99 F | SYSTOLIC BLOOD PRESSURE: 163 MMHG | OXYGEN SATURATION: 100 % | DIASTOLIC BLOOD PRESSURE: 107 MMHG

## 2024-02-14 DIAGNOSIS — T81.9XXA REACTION AT SURGICAL SITE, INITIAL ENCOUNTER: ICD-10-CM

## 2024-02-14 DIAGNOSIS — L25.9 CONTACT DERMATITIS, UNSPECIFIED CONTACT DERMATITIS TYPE, UNSPECIFIED TRIGGER: ICD-10-CM

## 2024-02-14 DIAGNOSIS — M25.562 PAIN AND SWELLING OF LEFT KNEE: Primary | ICD-10-CM

## 2024-02-14 DIAGNOSIS — R21 RASH: Primary | ICD-10-CM

## 2024-02-14 DIAGNOSIS — M25.462 PAIN AND SWELLING OF LEFT KNEE: Primary | ICD-10-CM

## 2024-02-14 DIAGNOSIS — R21 RASH: ICD-10-CM

## 2024-02-14 LAB
ALBUMIN SERPL-MCNC: 4.6 G/DL (ref 3.2–4.8)
ALP LIVER SERPL-CCNC: 62 U/L
ALT SERPL-CCNC: 16 U/L
ANION GAP SERPL CALC-SCNC: 9 MMOL/L (ref 0–18)
AST SERPL-CCNC: 18 U/L (ref ?–34)
BASOPHILS # BLD AUTO: 0.03 X10(3) UL (ref 0–0.2)
BASOPHILS NFR BLD AUTO: 0.3 %
BILIRUB DIRECT SERPL-MCNC: <0.1 MG/DL (ref ?–0.3)
BILIRUB SERPL-MCNC: 0.3 MG/DL (ref 0.3–1.2)
BUN BLD-MCNC: 19 MG/DL (ref 9–23)
BUN/CREAT SERPL: 24.4 (ref 10–20)
CALCIUM BLD-MCNC: 9.1 MG/DL (ref 8.7–10.4)
CHLORIDE SERPL-SCNC: 106 MMOL/L (ref 98–112)
CO2 SERPL-SCNC: 25 MMOL/L (ref 21–32)
CREAT BLD-MCNC: 0.78 MG/DL
DEPRECATED RDW RBC AUTO: 39.6 FL (ref 35.1–46.3)
EGFRCR SERPLBLD CKD-EPI 2021: 103 ML/MIN/1.73M2 (ref 60–?)
EOSINOPHIL # BLD AUTO: 0.62 X10(3) UL (ref 0–0.7)
EOSINOPHIL NFR BLD AUTO: 6.7 %
ERYTHROCYTE [DISTWIDTH] IN BLOOD BY AUTOMATED COUNT: 12.5 % (ref 11–15)
GLUCOSE BLD-MCNC: 89 MG/DL (ref 70–99)
HCT VFR BLD AUTO: 41.2 %
HGB BLD-MCNC: 13.4 G/DL
IMM GRANULOCYTES # BLD AUTO: 0.02 X10(3) UL (ref 0–1)
IMM GRANULOCYTES NFR BLD: 0.2 %
LYMPHOCYTES # BLD AUTO: 2.57 X10(3) UL (ref 1–4)
LYMPHOCYTES NFR BLD AUTO: 27.7 %
MCH RBC QN AUTO: 28.1 PG (ref 26–34)
MCHC RBC AUTO-ENTMCNC: 32.5 G/DL (ref 31–37)
MCV RBC AUTO: 86.4 FL
MONOCYTES # BLD AUTO: 0.5 X10(3) UL (ref 0.1–1)
MONOCYTES NFR BLD AUTO: 5.4 %
NEUTROPHILS # BLD AUTO: 5.53 X10 (3) UL (ref 1.5–7.7)
NEUTROPHILS # BLD AUTO: 5.53 X10(3) UL (ref 1.5–7.7)
NEUTROPHILS NFR BLD AUTO: 59.7 %
OSMOLALITY SERPL CALC.SUM OF ELEC: 292 MOSM/KG (ref 275–295)
PLATELET # BLD AUTO: 345 10(3)UL (ref 150–450)
POTASSIUM SERPL-SCNC: 4 MMOL/L (ref 3.5–5.1)
PROT SERPL-MCNC: 8 G/DL (ref 5.7–8.2)
RBC # BLD AUTO: 4.77 X10(6)UL
SODIUM SERPL-SCNC: 140 MMOL/L (ref 136–145)
WBC # BLD AUTO: 9.3 X10(3) UL (ref 4–11)

## 2024-02-14 PROCEDURE — 80076 HEPATIC FUNCTION PANEL: CPT | Performed by: EMERGENCY MEDICINE

## 2024-02-14 PROCEDURE — 93971 EXTREMITY STUDY: CPT | Performed by: EMERGENCY MEDICINE

## 2024-02-14 PROCEDURE — 80048 BASIC METABOLIC PNL TOTAL CA: CPT | Performed by: EMERGENCY MEDICINE

## 2024-02-14 PROCEDURE — 99284 EMERGENCY DEPT VISIT MOD MDM: CPT

## 2024-02-14 PROCEDURE — 99285 EMERGENCY DEPT VISIT HI MDM: CPT

## 2024-02-14 PROCEDURE — 36415 COLL VENOUS BLD VENIPUNCTURE: CPT

## 2024-02-14 PROCEDURE — 85025 COMPLETE CBC W/AUTO DIFF WBC: CPT | Performed by: EMERGENCY MEDICINE

## 2024-02-14 PROCEDURE — 99213 OFFICE O/P EST LOW 20 MIN: CPT

## 2024-02-14 RX ORDER — PREDNISONE 20 MG/1
40 TABLET ORAL DAILY
Qty: 6 TABLET | Refills: 0 | Status: SHIPPED | OUTPATIENT
Start: 2024-02-14 | End: 2024-02-17

## 2024-02-14 RX ORDER — TRIAMCINOLONE ACETONIDE 1 MG/G
1 CREAM TOPICAL 2 TIMES DAILY
Qty: 28.4 G | Refills: 0 | Status: SHIPPED | OUTPATIENT
Start: 2024-02-14 | End: 2024-02-21

## 2024-02-15 ENCOUNTER — TELEPHONE (OUTPATIENT)
Dept: ORTHOPEDICS CLINIC | Facility: CLINIC | Age: 34
End: 2024-02-15

## 2024-02-15 NOTE — TELEPHONE ENCOUNTER
Called patient to follow-up on her ED visit.  States she was placed on topical and oral steroids.  To started the first dose this morning.  Rash is unchanged.  She is going to call to schedule up with dermatology hopefully this week or early next week.  She is scheduled to see me next week.  Will continue to monitor.  Will follow-up with me sooner with any worsening symptoms questions or concerns.

## 2024-02-15 NOTE — ED PROVIDER NOTES
Patient Seen in: [facility] Emergency Department      History     Chief Complaint   Patient presents with    Rash Skin Problem     Stated Complaint: From [place] [facility] w/ c/o rash to left leg post surgery    Subjective:   33-year-old female presenting with a itchy raised rash to her left knee and leg that started 1 week ago.  She had arthroscopic knee surgery about 16 days ago.  She was using Betadine ointment.  She said the day after the rash started, she saw her orthopedics and they felt this was related to the ointment.  They told her to stop using it and then the drain some blood from her joint.  She said the joint swelling is better than when they first saw her.  She has significant itching so she is taking liquid Benadryl today and the rash has been spreading.  It is not particularly painful.  No fever or chills.  No dizziness.  No vomiting or diarrhea.            Objective:   Past Medical History:   Diagnosis Date    Essential hypertension     GERD (gastroesophageal reflux disease)     High blood pressure     Hx of motion sickness     when in a moving vehicle    Vertigo     Visual impairment     contact lenses and glasses              No pertinent past surgical history.              No pertinent social history.            Review of Systems    Positive for stated complaint: From [place] [facility] w/ c/o rash to left leg post surgery  Other systems are as noted in HPI.  Constitutional and vital signs reviewed.      All other systems reviewed and negative except as noted above.    Physical Exam     ED Triage Vitals [02/14/24 1911]   BP (!) 146/95   Pulse 94   Resp 18   Temp 98.5 °F (36.9 °C)   Temp src Oral   SpO2 98 %   O2 Device None (Room air)       Current:BP (!) 126/91   Pulse 89   Temp 98.4 °F (36.9 °C) (Temporal)   Resp 20   Ht 165.1 cm (5' 5\")   Wt 77.1 kg   LMP 01/20/2024 (Exact Date)   SpO2 99%   BMI 28.29 kg/m²         Physical Exam  HENT:      Head: Normocephalic.      Right Ear: External ear  normal.      Left Ear: External ear normal.      Nose: Nose normal.      Mouth/Throat:      Mouth: Mucous membranes are moist.   Eyes:      Extraocular Movements: Extraocular movements intact.      Pupils: Pupils are equal, round, and reactive to light.   Cardiovascular:      Rate and Rhythm: Normal rate and regular rhythm.      Pulses: Normal pulses.   Abdominal:      Palpations: Abdomen is soft.   Musculoskeletal:      Comments: Left lower extremity able to flex the knee but a little stiff.  Diffuse thickened lichenified rash which is papular.  It is violaceous.  Not erythematous.  Distally neurovascular tact.  It spares the ankle and above the proximal thigh.  No significant warmth.  Please see photos.   Skin:     General: Skin is warm.      Capillary Refill: Capillary refill takes less than 2 seconds.   Neurological:      Mental Status: She is alert.      Sensory: No sensory deficit.      Motor: No weakness.                         ED Course     Labs Reviewed   BASIC METABOLIC PANEL (8) - Abnormal; Notable for the following components:       Result Value    BUN/CREA Ratio 24.4 (*)     All other components within normal limits   HEPATIC FUNCTION PANEL (7) - Normal   CBC WITH DIFFERENTIAL WITH PLATELET    Narrative:     The following orders were created for panel order CBC With Differential With Platelet.  Procedure                               Abnormality         Status                     ---------                               -----------         ------                     CBC W/ DIFFERENTIAL[[id number]]                              Final result                 Please view results for these tests on the individual orders.   CBC W/ DIFFERENTIAL          ED Course as of 02/14/24 2230  ------------------------------------------------------------  Time: 02/14 2022  Comment: Discussed with Dr. Cotton.  Does not think this is septic arthritis.  She can move it easily without pain.  He wants her to call her  operating team tomorrow to get in quickly.  He is recommending oral steroids.  ------------------------------------------------------------  Time: 02/14 2123  Comment: Labs independently interpreted by me.  Unremarkable CBC and BMP and hepatic profile.  Awaiting ultrasound  ------------------------------------------------------------  Time: 02/14 2227  Comment: US shows no DVT.  Independently interpreted by me.              [facility]      US VENOUS DOPPLER LEG LEFT - DIAG IMG (CPT=93971)    Result Date: 2/14/2024  CONCLUSION:    No sonographic evidence of acute deep venous thrombosis.     Dictated by (CST): Joanna Hernandes MD on 2/14/2024 at 10:16 PM     Finalized by (CST): Joanna Hernandes MD on 2/14/2024 at 10:16 PM                                            Medical Decision Making  Patient with pruritic rash that started in her knee about a week after surgery.  She had been using Betadine ointment in the area.  Her doctors told her to stop using it but the rash continued to spread.  She is not sure if she used any other type of irritant in the area or with her dressings.  She has no fever or chills.  Able to flex the knee and seems to be less swollen since they drained blood out of it 6 days ago.  Differential could include but is not limited to infectious process, contact dermatitis, fungal infection, septic knee, DVT, other dermatologic process.  Will discuss with orthopedics and have her follow-up with dermatology soon as possible.    Amount and/or Complexity of Data Reviewed  External Data Reviewed: notes.     Details: OR note from 1/30:  PREOPERATIVE DIAGNOSIS:  Left knee synovial loose body.  POSTOPERATIVE DIAGNOSIS:    1.       Left knee infrapatellar synovial loose body.  2.       Chondromalacia, inferolateral patella.  3.       Hypertrophic infrapatellar synovitis.  PROCEDURE:    1.       Left knee arthroscopy with removal of synovial loose body.   2.       Chondroplasty, patella.  3.       Partial  synovectomy and excision of inferolateral hypertrophic fat pad.    Labs: ordered. Decision-making details documented in ED Course.  Radiology: ordered and independent interpretation performed. Decision-making details documented in ED Course.    Risk  Prescription drug management.        Disposition and Plan     Clinical Impression:  1. Rash    2. Contact dermatitis, unspecified contact dermatitis type, unspecified trigger         Disposition:  Discharge  2/14/2024 10:29 pm    Follow-up:  Nury Brown MD  [address] [place] [id number]  [phone]    Follow up in 2 day(s)      Mor Sky MD  [address] [facility]  [address]  [facility] [address] [phone]    Follow up in 2 day(s)            Medications Prescribed:  Current Discharge Medication List        START taking these medications    Details   predniSONE 20 MG Oral Tab Take 2 tablets (40 mg total) by mouth daily for 3 days.  Qty: 6 tablet, Refills: 0      triamcinolone 0.1 % External Cream Apply 1 Application topically 2 (two) times daily for 7 days.  Qty: 28.4 g, Refills: 0

## 2024-02-15 NOTE — DISCHARGE INSTRUCTIONS
Take Benadryl every 6 hours as needed.  Use the triamcinolone cream twice a day as directed.  Take the prednisone for 3 days as directed.  Follow-up with the dermatologist as soon as possible.  Call in the morning and let them know you are an ER patient.  Avoid any scented soaps or creams.  Read all instructions for further recommendations.  Return to the ER for changes worsening or new problems.

## 2024-02-15 NOTE — ED INITIAL ASSESSMENT (HPI)
Patient arrived ambulatory to room c/o a rash to the left leg. Patient states she had a knee scope done on 1/30 and noticed the urticarial rash 7 days ago. Progressively worsening. +itchy. No fevers.

## 2024-02-15 NOTE — ED INITIAL ASSESSMENT (HPI)
Pt presents to the ED with c/o worsening left leg rash for two weeks. Pt had knee surgery on 1/30 and had fluid removed one week ago from her knee. Pt reports now she has developed blisters. Pt concerned for cellulitis. Denies fevers.

## 2024-02-15 NOTE — ED PROVIDER NOTES
Patient Seen in: [facility]      History     Chief Complaint   Patient presents with    Rash     Stated Complaint: surgical site rash    Subjective:   HPI.  Kimi Mariscal is a 33 year old female accompanied by family here for increased left knee swelling, and rash that is worsening over the last 7 days.  Left knee arthroscopy 01/30/2024.  knee effusion after surgery, and had drained at orthopedic surgeons office 2/8/2024.  Denies pain out of proportion.  Like itches more than she has pain.  Now developed rash with blisters.  No shortness of breath, chest pain, dizziness, syncope, or other complaints at this time.      Objective:   Past Medical History:   Diagnosis Date    High blood pressure     Hx of motion sickness     when in a moving vehicle    Vertigo     Visual impairment     contact lenses and glasses              Past Surgical History:   Procedure Laterality Date    NEEDLE BIOPSY LEFT      UPPER GI ENDOSCOPY,EXAM                  Social History     Socioeconomic History    Marital status:    Tobacco Use    Smoking status: Never    Smokeless tobacco: Never   Vaping Use    Vaping Use: Never used   Substance and Sexual Activity    Alcohol use: Yes     Alcohol/week: 0.0 standard drinks of alcohol     Comment: socially    Drug use: No              Review of Systems    Positive for stated complaint: surgical site rash  Other systems are as noted in HPI.  Constitutional and vital signs reviewed.      All other systems reviewed and negative except as noted above.    Physical Exam     ED Triage Vitals [02/14/24 1803]   BP (!) 163/107   Pulse 117   Resp 20   Temp 98.7 °F (37.1 °C)   Temp src    SpO2 100 %   O2 Device None (Room air)       Current:BP (!) 163/107   Pulse 117   Temp 98.7 °F (37.1 °C)   Resp 20   LMP 01/20/2024 (Exact Date)   SpO2 100%         Physical Exam  Vitals and nursing note reviewed.   Constitutional:       Appearance: Normal appearance. She is not ill-appearing.    HENT:      Head: Normocephalic.      Nose: Nose normal.   Eyes:      Pupils: Pupils are equal, round, and reactive to light.   Cardiovascular:      Rate and Rhythm: Regular rhythm. Tachycardia present.      Comments: Repeat heart rate 116 bpm from previous 130. Regular rhythm.  Pulmonary:      Effort: Pulmonary effort is normal. No respiratory distress.      Breath sounds: Normal breath sounds.   Musculoskeletal:      Comments: Decreased range of motion to left knee.  Surrounding compartments soft.  Good pulses.  Patella effusion noted.    Skin:     Capillary Refill: Capillary refill takes less than 2 seconds.      Findings: Erythema present.      Comments: Rash to left leg.    Neurological:      General: No focal deficit present.      Mental Status: She is alert and oriented to person, place, and time.      Sensory: No sensory deficit.   Psychiatric:         Mood and Affect: Mood normal.         Behavior: Behavior normal.         Thought Content: Thought content normal.         Judgment: Judgment normal.             ED Course   Labs Reviewed - No data to display                   MDM                                        Medical Decision Making  [facility] for further evaluation of knee swelling, and rash.  Differential diagnosis includes but not limited to DVT, atypical PE, septic joint, necrotizing fasciitis, cellulitic MRSA.  She is of sound mind with decision making capabilities without suspicion of altered mental status, drugs, or etoh.  Stable to go to ER via car.  Mother with patient.  No acute distress and stable for self transfer    Problems Addressed:  Pain and swelling of left knee: acute illness or injury  Rash: acute illness or injury  Reaction at surgical site, initial encounter: acute illness or injury        Disposition and Plan     Clinical Impression:  1. Pain and swelling of left knee    2. Reaction at surgical site, initial encounter    3. Rash          Disposition:  Ic to ed  2/14/2024  6:25 pm    Follow-up:  No follow-up provider specified.        Medications Prescribed:  Discharge Medication List as of 2/14/2024  6:27 PM

## 2024-02-18 DIAGNOSIS — Z30.41 ENCOUNTER FOR SURVEILLANCE OF CONTRACEPTIVE PILLS: ICD-10-CM

## 2024-02-19 ENCOUNTER — APPOINTMENT (OUTPATIENT)
Dept: URBAN - METROPOLITAN AREA CLINIC 244 | Age: 34
Setting detail: DERMATOLOGY
End: 2024-02-20

## 2024-02-19 DIAGNOSIS — L259 CONTACT DERMATITIS AND OTHER ECZEMA, UNSPECIFIED CAUSE: ICD-10-CM

## 2024-02-19 PROBLEM — L23.9 ALLERGIC CONTACT DERMATITIS, UNSPECIFIED CAUSE: Status: ACTIVE | Noted: 2024-02-19

## 2024-02-19 PROCEDURE — OTHER DIAGNOSIS COMMENT: OTHER

## 2024-02-19 PROCEDURE — OTHER COUNSELING: OTHER

## 2024-02-19 PROCEDURE — 99203 OFFICE O/P NEW LOW 30 MIN: CPT

## 2024-02-19 PROCEDURE — OTHER PRESCRIPTION: OTHER

## 2024-02-19 RX ORDER — TRIAMCINOLONE ACETONIDE 1 MG/G
CREAM TOPICAL
Qty: 453.6 | Refills: 0 | Status: ERX | COMMUNITY
Start: 2024-02-19

## 2024-02-19 RX ORDER — NORGESTIMATE AND ETHINYL ESTRADIOL
1 KIT DAILY
Qty: 84 TABLET | Refills: 3 | Status: SHIPPED | OUTPATIENT
Start: 2024-02-19

## 2024-02-19 ASSESSMENT — LOCATION SIMPLE DESCRIPTION DERM
LOCATION SIMPLE: LEFT THIGH
LOCATION SIMPLE: LEFT PRETIBIAL REGION

## 2024-02-19 ASSESSMENT — LOCATION DETAILED DESCRIPTION DERM
LOCATION DETAILED: LEFT PROXIMAL PRETIBIAL REGION
LOCATION DETAILED: LEFT ANTERIOR DISTAL THIGH

## 2024-02-19 ASSESSMENT — LOCATION ZONE DERM: LOCATION ZONE: LEG

## 2024-02-19 NOTE — TELEPHONE ENCOUNTER
PAP 12/15/23-repeat in 3 years  Refill passed per [facility] protocol.      Requested Prescriptions   Pending Prescriptions Disp Refills    TRI-LO-OLEKSANDR 0.18/0.215/0.25 MG-25 MCG Oral Tab [Pharmacy Med Name: TRI-LO-OLEKSANDR TABLET] 84 tablet 1     Sig: TAKE 1 TABLET BY MOUTH EVERY DAY       Gynecology Medication Protocol Passed - 2/18/2024 12:29 AM        Passed - PASS-PENDING LAST PAP WNL--VIA MANUAL LOOKUP        Passed - Physical or Pelvic/Breast in past 12 or next 3 mos--VIA MANUAL LOOKUP              Future Appointments         Provider Department Appt Notes    In 2 days Jackelyn Holguin PA-C [address] Follow up on left knee aspiration, rash            Recent Outpatient Visits              1 week ago Hemarthrosis of left knee    [address] Jackelyn Holguin PA-C    Office Visit    [facility]    Office Visit    2 months ago 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

## 2024-02-19 NOTE — PROCEDURE: DIAGNOSIS COMMENT
Detail Level: Simple
Comment: Patient had knee surgery done at end of January \\Jenifer rx 3 days prednisone and Triamcinolone \\nRefer patient to Dr. Kenneth Dos Santos for patch testing
Render Risk Assessment In Note?: no

## 2024-02-21 ENCOUNTER — OFFICE VISIT (OUTPATIENT)
Dept: ORTHOPEDICS CLINIC | Facility: CLINIC | Age: 34
End: 2024-02-21
Payer: COMMERCIAL

## 2024-02-21 VITALS — WEIGHT: 170 LBS | HEIGHT: 65 IN | BODY MASS INDEX: 28.32 KG/M2

## 2024-02-21 DIAGNOSIS — M25.062 HEMARTHROSIS OF LEFT KNEE: Primary | ICD-10-CM

## 2024-02-21 DIAGNOSIS — Z48.89 AFTERCARE FOLLOWING SURGERY: ICD-10-CM

## 2024-02-21 DIAGNOSIS — Z98.890 STATUS POST ARTHROSCOPY OF LEFT KNEE: ICD-10-CM

## 2024-02-21 PROCEDURE — 99024 POSTOP FOLLOW-UP VISIT: CPT | Performed by: PHYSICIAN ASSISTANT

## 2024-02-21 NOTE — PROGRESS NOTES
[facility] Post Op Progress Note    Date of Surgery: 01/30/2024      Subjective: Kimi Mariscal is a 33 year old female who is here for follow-up of her left knee.  She underwent left knee arthroscopy with removal of loose synovial body, chondroplasty patella and partial synovectomy approximately 3 weeks ago.  She did unfortunately have a hemarthrosis at her last visit which was aspirated.  At that time she was experiencing the beginning of a rash to her thigh and lower leg.  Unfortunately over the last few weeks this progressed.  She was seen and evaluated at the emergency department at which time a Doppler ultrasound was negative for DVT and she was given topical and oral prednisone.  She was advised to follow-up with dermatology who diagnosed her with the contact dermatitis.  She is continued with the topical anti-inflammatory and notes improvements in the rash but has some itching as well.  She is also scheduled to follow-up with the allergist.  As for the knee, it does feel tight and swollen but this is improving.  She has been using crutches for ambulatory assistance.      Objective: Exam of the left knee and lower extremity reveals that the portals are well-healed.  She does have a moderate effusion.  She lacks 2 degrees of full extension and flexion to 95 degrees.  She does have a resolving rash from her mid thigh to her mid shin.  No blistering.  Sensation is present to light touch.      Assessment: Resolving contact dermatitis and hemarthrosis status post left knee arthroscopy with removal of synovial loose body, chondroplasty, and partial synovectomy      Plan: Fortunately, her rash is improving.  She continues to have swelling in the knee.  We recommend continued conservative treatment including oral anti-inflammatories, rest, ice, elevation and compression.  She will work on range of motion exercises on her own to regain range of motion and pushed through any adhesions that may be forming.  We did  offer her a formal therapy program.  She will try doing exercises on her own at home for the time being but will go to PT in the next week or so if she is not progressing significantly.  She is also able to discontinue the crutches when she feels ready and stable.  We will follow-up with her in 3 weeks for reevaluation.  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  She will follow-up with us in the interim with worsening symptoms, questions or concerns. The patient was seen and evaluated by Dr. Brown who agrees with the assessment and plan.        Jackelyn Holguin, JIGNESH, PA-C Orthopedic Surgery   [facility]  [address] [place] [address]   t: [phone]  f: [phone]

## 2024-02-24 DIAGNOSIS — I10 ESSENTIAL HYPERTENSION: ICD-10-CM

## 2024-02-26 RX ORDER — AMLODIPINE BESYLATE 10 MG/1
10 TABLET ORAL DAILY
Qty: 90 TABLET | Refills: 3 | OUTPATIENT
Start: 2024-02-26

## 2024-03-11 ENCOUNTER — OFFICE VISIT (OUTPATIENT)
Dept: ORTHOPEDICS CLINIC | Facility: CLINIC | Age: 34
End: 2024-03-11
Payer: COMMERCIAL

## 2024-03-11 VITALS — BODY MASS INDEX: 28.32 KG/M2 | HEIGHT: 65 IN | WEIGHT: 170 LBS

## 2024-03-11 DIAGNOSIS — Z48.89 AFTERCARE FOLLOWING SURGERY: Primary | ICD-10-CM

## 2024-03-11 DIAGNOSIS — Z98.890 STATUS POST ARTHROSCOPY OF LEFT KNEE: ICD-10-CM

## 2024-03-11 NOTE — PROGRESS NOTES
[facility] Post Op Progress Note    Date of Surgery: 01/30/2024      Subjective:  Kimi Mariscal is a 33 year old female who is here for follow-up of her left knee.  She underwent left knee arthroscopy with removal of loose synovial body, chondroplasty patella and partial synovectomy approximately 6 weeks ago.  Unfortunately, her postoperative recovery was complicated by a hemarthrosis and contact dermatitis rash.  Fortunately over the past few weeks, the rash has significantly improved as well as her swelling and her range of motion.  She has been doing therapy on her own.  She notes some endrange tightness and continues to use 1 crutch but is doing much better.  She does have some pain anteriorly otherwise no significant complaints.      Objective: Exam of the left knee and lower extremity reveals that the portals are well-healed.  She has near full extension and flexion to 125 degrees.  She does have a mild effusion.  No calf tenderness upon palpation.  Sensation is present to light touch.      Assessment: Resolving contact dermatitis and hemarthrosis status post left knee arthroscopy with removal of loose body, chondroplasty and partial synovectomy      Plan: Overall, she is doing much better.  She has improving range of motion and decreasing effusion.  She will continue to work on swelling control as well as range of motion and quad activation.  She will try doing this on her own and will initiate formal therapy if she feels that she is not progressing enough.  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  She will contact me in a week and a half to discuss her symptoms and how she is doing.  We will discuss return to work at that time.  She will otherwise will follow-up with me in 3 to 4 weeks for recheck in hopes of resolving effusion and full range of motion.  She will follow-up sooner with questions, concerns, or worsening symptoms.      Jackelyn Holguin, [facility], PA-C Orthopedic Surgery    [address] [place] [address]   t: [phone]  f: [phone]

## 2024-03-20 ENCOUNTER — PATIENT MESSAGE (OUTPATIENT)
Dept: ORTHOPEDICS CLINIC | Facility: CLINIC | Age: 34
End: 2024-03-20

## 2024-03-20 NOTE — TELEPHONE ENCOUNTER
From: Kimi Mariscal  To: Jackelyn Holguin  Sent: 3/20/2024 1:35 PM CDT  Subject: Updates on left knee    Hi, just wanted to update on my progress and determine a return to work date. Per short term disability, should be 3/26/24, but they called me and said if I needed an extension, the office can fill out a return to work form?   I started walking without a crutch on Monday and started PT yesterday, 2nd appt today at 2p, 3 sessions next week, and 2 the following week. Trying to build up time standing to get ready for work. Knee is still swollen, but I have been icing and compressing. Therapist was hoping to have next week off still. Wondering if I need to have the swelling looked at still next week, or when should I expect it to go down?

## 2024-03-21 NOTE — TELEPHONE ENCOUNTER
LOV 3/11/24  DOS 1/30/24    Per notes, pt to contact Jackelyn in 1-1/2 weeks to update how she is doing to discuss return to work.     Please see pts update below. Thank you!

## 2024-04-03 ENCOUNTER — TELEPHONE (OUTPATIENT)
Dept: ORTHOPEDICS CLINIC | Facility: CLINIC | Age: 34
End: 2024-04-03

## 2024-04-03 DIAGNOSIS — Z48.89 AFTERCARE FOLLOWING SURGERY: ICD-10-CM

## 2024-04-03 DIAGNOSIS — M25.062 HEMARTHROSIS OF LEFT KNEE: Primary | ICD-10-CM

## 2024-04-03 DIAGNOSIS — Z98.890 STATUS POST ARTHROSCOPY OF LEFT KNEE: ICD-10-CM

## 2024-04-03 NOTE — TELEPHONE ENCOUNTER
Jackelyn Holguin PA-C   to [facility] Clinical [facility]       4/3/24 12:55 PM  Can we fax existing order or do they need a new one?  Thank you!

## 2024-04-10 ENCOUNTER — OFFICE VISIT (OUTPATIENT)
Dept: ORTHOPEDICS CLINIC | Facility: CLINIC | Age: 34
End: 2024-04-10
Payer: COMMERCIAL

## 2024-04-10 VITALS — HEIGHT: 65 IN | WEIGHT: 170 LBS | BODY MASS INDEX: 28.32 KG/M2

## 2024-04-10 DIAGNOSIS — Z48.89 AFTERCARE FOLLOWING SURGERY: ICD-10-CM

## 2024-04-10 DIAGNOSIS — Z98.890 STATUS POST ARTHROSCOPY OF LEFT KNEE: Primary | ICD-10-CM

## 2024-04-10 PROCEDURE — 99024 POSTOP FOLLOW-UP VISIT: CPT | Performed by: PHYSICIAN ASSISTANT

## 2024-04-10 NOTE — PROGRESS NOTES
[facility] Post Op Progress Note    Date of Surgery: 01/30/2024      Subjective: Kimi Mariscal is a 33 year old female who is here for follow-up of her left knee.  She underwent left knee arthroscopy with removal of loose synovial body, chondroplasty patella and partial synovectomy approximately 10 weeks ago.  Unfortunately her postoperative recovery was complicated by hemarthrosis and contact dermatitis rash.  She has been attending outpatient physical therapy and notes improvements in range of motion and strength.  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  She does note occasional swelling but this is improving.        Objective: Exam of the left knee and lower extremity reveals that the overlying skin is intact.  She has full extension and full flexion without pain.  Trace palpable effusion.  No significant atrophy with quad activation.  She is able to do a straight leg raise without pain.      Assessment: Status post left knee arthroscopy with removal of loose body, chondroplasty and partial synovectomy      Plan: She is doing much better.  She has normalizing range of motion and good strength and will continue with physical therapy and transition to a home exercise program.  She will continue to work as tolerated.  She will follow-up in 6 weeks for final recheck only as needed with persistent symptoms.      Jackelyn Holguin, [facility], PA-C Orthopedic Surgery   [address] [place] [address]   t: [phone]  f: [phone]

## 2024-05-25 DIAGNOSIS — I10 ESSENTIAL HYPERTENSION: ICD-10-CM

## 2024-05-29 RX ORDER — LABETALOL 100 MG/1
100 TABLET, FILM COATED ORAL 2 TIMES DAILY
Qty: 180 TABLET | Refills: 3 | OUTPATIENT
Start: 2024-05-29

## 2024-09-19 ENCOUNTER — PATIENT MESSAGE (OUTPATIENT)
Dept: FAMILY MEDICINE CLINIC | Facility: CLINIC | Age: 34
End: 2024-09-19

## 2024-09-19 DIAGNOSIS — R92.8 ABNORMAL MAMMOGRAM: Primary | ICD-10-CM

## 2024-09-20 PROBLEM — R92.8 ABNORMAL MAMMOGRAM: Status: ACTIVE | Noted: 2024-09-20

## 2024-09-20 NOTE — TELEPHONE ENCOUNTER
Last office visit was 01/2024.  Last mammogram was 06/28/22 and last ultrasound of the right breast was 01/31/23.  Please advise as her request for orders did not pass protocol.

## 2024-10-16 ENCOUNTER — OFFICE VISIT (OUTPATIENT)
Dept: FAMILY MEDICINE CLINIC | Facility: CLINIC | Age: 34
End: 2024-10-16
Payer: COMMERCIAL

## 2024-10-16 VITALS
DIASTOLIC BLOOD PRESSURE: 84 MMHG | RESPIRATION RATE: 20 BRPM | TEMPERATURE: 98 F | OXYGEN SATURATION: 98 % | SYSTOLIC BLOOD PRESSURE: 128 MMHG | HEIGHT: 65 IN | WEIGHT: 182 LBS | BODY MASS INDEX: 30.32 KG/M2 | HEART RATE: 83 BPM

## 2024-10-16 DIAGNOSIS — H10.31 ACUTE CONJUNCTIVITIS OF RIGHT EYE, UNSPECIFIED ACUTE CONJUNCTIVITIS TYPE: Primary | ICD-10-CM

## 2024-10-16 PROCEDURE — 99213 OFFICE O/P EST LOW 20 MIN: CPT | Performed by: NURSE PRACTITIONER

## 2024-10-16 RX ORDER — OFLOXACIN 3 MG/ML
1 SOLUTION/ DROPS OPHTHALMIC 4 TIMES DAILY
Qty: 5 ML | Refills: 0 | Status: SHIPPED | OUTPATIENT
Start: 2024-10-16 | End: 2024-10-23

## 2024-10-16 RX ORDER — LABETALOL 100 MG/1
100 TABLET, FILM COATED ORAL 2 TIMES DAILY
COMMUNITY

## 2024-10-16 NOTE — PROGRESS NOTES
CHIEF COMPLAINT:     Chief Complaint   Patient presents with    Eye Problem     Possible right eye scratch on Thursday.         HPI:   Kimi Mariscal is a 33 year old female who presents with chief complaint of \"possible eye scratch\".  Symptoms began  6  days ago.  On Thursday, she felt an eyelash under her contact so she took the contacts out.  She said she had a sensation like she scratched her eye.  Symptoms have been improving since onset.   Patient reports slight right eye dryness, slightly sensitive, slight right eye drainage, slight redness.  Denies eyelid crusting, itching, eyelid swelling, tearing. Contact lens wearer: yes.   Denies fever, change in vision, sensitivity to light, pain with eye movement, cold symptoms, eye injury, or contact with irritant.     Treatments tried: changed to wearing glasses.   Previous eye surgery: denies  No exposure to COVID    Current Outpatient Medications   Medication Sig Dispense Refill    labetalol 100 MG Oral Tab Take 1 tablet (100 mg total) by mouth 2 (two) times daily.      ofloxacin 0.3 % Ophthalmic Solution Place 1 drop into the right eye 4 (four) times daily for 7 days. 5 mL 0    Norgestim-Eth Estrad Triphasic (TRI-LO-OLEKSANDR) 0.18/0.215/0.25 MG-25 MCG Oral Tab TAKE 1 TABLET BY MOUTH EVERY DAY 84 tablet 3      Past Medical History:    Essential hypertension    GERD (gastroesophageal reflux disease)    High blood pressure    Hx of motion sickness    when in a moving vehicle    Vertigo    Visual impairment    contact lenses and glasses      Past Surgical History:   Procedure Laterality Date    Needle biopsy left      Upper gi endoscopy,exam        Family History   Problem Relation Age of Onset    Hypertension Father     Cancer Father         prostate    Hypertension Mother     Heart Disorder Maternal Grandmother     Lung Disorder Maternal Grandmother     Diabetes Maternal Grandfather     Cancer Maternal Grandfather     Colon Cancer Maternal Grandfather     Heart  Disorder Maternal Grandfather     Other (Other) Paternal Grandmother     Other (Other) Paternal Grandfather     Pancreatic Cancer Paternal Uncle     Breast Cancer Other         mat great aunt      Social History     Socioeconomic History    Marital status:    Tobacco Use    Smoking status: Never    Smokeless tobacco: Never   Vaping Use    Vaping status: Never Used   Substance and Sexual Activity    Alcohol use: Yes     Alcohol/week: 0.0 standard drinks of alcohol     Comment: socially    Drug use: No         REVIEW OF SYSTEMS:   GENERAL: feels well otherwise  SKIN: no rashes  EYES:denies blurred vision or double vision. See HPI  HENT: denies ear pain, congestion, sore throat  LUNGS: denies shortness of breath or cough  CARDIOVASCULAR: denies chest pain or palpitations   GI: denies N/V/C or abdominal pain  NEURO: denies headaches     EXAM:   /84 (BP Location: Right arm, Patient Position: Sitting, Cuff Size: adult)   Pulse 83   Temp 98.3 °F (36.8 °C) (Oral)   Resp 20   Ht 5' 5\" (1.651 m)   Wt 182 lb (82.6 kg)   LMP 09/28/2024 (Exact Date)   SpO2 98%   BMI 30.29 kg/m²   GENERAL: well developed, well nourished,in no apparent distress  SKIN: no rashes,no suspicious lesions  EYES: PERRLA, EOMI, Right conjunctiva minimally erythematous, no discharge, slight tearing,  no lid swelling.  No swelling or redness of periorbital tissue.  Vision corrected - right eye : 20/25, left eye 20/25.  No abnormal dye uptake noted to right eye.   HENT: atraumatic, normocephalic, throat clear  NECK: supple, non tender  LUNGS: clear to auscultation bilaterally.   CARDIO: RRR without murmur  LYMPH: No preauricular lymphadenopathy. No cervical lymphadenopathy  Fluorescein Eye Stain Procedure   Patient gave verbal consent. Risks and Benefits of removal were discussed with the patient, who agreed to proceed with procedure.   Site: Right Eye   Indication: Right eye redness, possible irritant  Prep: Fluorescein stain paper was  prepped with sterile saline   Procedure: Fluroescein stain paper was touched directly to eye just inside outer canthus. Blue flash light was used to check cornea.   No corneal epithelial defect highlighted by fluorescein staining.    Patient Status Tolerated Well   No complications    ASSESSMENT AND PLAN:   Kimi Mariscal is a 33 year old female who presents with:    ASSESSMENT:   Encounter Diagnosis   Name Primary?    Acute conjunctivitis of right eye, unspecified acute conjunctivitis type Yes       PLAN:   It has been 6 days.  Mild symptoms. No sign of cornea scratch    Hygiene and comfort care as listed in patient instructions.    Medication and instructions as listed below  Warm compresses to affected eye prn.  Advised patient to avoid touching eyes; importance stressed of good handwashing.    Risks, benefits, complications and side effects of meds discussed.  Follow up with PCP or eye doctor if not improved in 2-3 days    Requested Prescriptions     Signed Prescriptions Disp Refills    ofloxacin 0.3 % Ophthalmic Solution 5 mL 0     Sig: Place 1 drop into the right eye 4 (four) times daily for 7 days.             Patient Instructions   Avoid touching eyes, you can hold off on eye drops and monitor.  If start the drops, complete the entire 7 day prescription even after the symptoms have gone away.   Launder your pillow case used last night and tomorrow morning.   Apply a new clean warm moist compress to the affected eye as often as possible today. This is comforting and the warm compress increases the blood flow to the area and promotes healing.   Discard any contact lenses that were worn recently and do not wear contacts for about 7 days.   Discard any eye makeup worn recently.   If you develop any eye pain or vision changes, seek immediate care.  Follow up with your PCP if you do not continue to improve with each day.     The patient verbalizes understanding and is in agreement with treatment plan.

## 2024-10-17 ENCOUNTER — HOSPITAL ENCOUNTER (OUTPATIENT)
Dept: ULTRASOUND IMAGING | Facility: HOSPITAL | Age: 34
Discharge: HOME | End: 2024-10-17
Attending: NURSE PRACTITIONER
Payer: COMMERCIAL

## 2024-10-17 ENCOUNTER — HOSPITAL ENCOUNTER (OUTPATIENT)
Dept: MAMMOGRAPHY | Facility: HOSPITAL | Age: 34
Discharge: HOME | End: 2024-10-17
Attending: NURSE PRACTITIONER
Payer: COMMERCIAL

## 2024-10-17 DIAGNOSIS — R92.8 ABNORMAL MAMMOGRAM: ICD-10-CM

## 2024-10-17 PROCEDURE — 77062 BREAST TOMOSYNTHESIS BI: CPT | Performed by: NURSE PRACTITIONER

## 2024-10-17 PROCEDURE — 76642 ULTRASOUND BREAST LIMITED: CPT | Performed by: NURSE PRACTITIONER

## 2024-10-17 PROCEDURE — 77066 DX MAMMO INCL CAD BI: CPT | Performed by: NURSE PRACTITIONER

## 2024-10-17 NOTE — DISCHARGE INSTRUCTIONS
The Doctor (Radiologist) who performed your procedure was: DR CERNA    Place an ice pack over the biopsy site on top of your bra or on top of the ACE wrap (never apply ice directly over skin) for 10-15 minutes of every hour until bedtime for your comfort and to decrease bleeding.  Keep your sports bra or the ACE wrap (stereotactic and MRI biopsy) in place for 24 hours after your biopsy. This compression decreases bleeding and breast movement for your comfort. Wear a supportive bra for the next couple of days for comfort (sports bra for sleep).   Continue to wear, preferably, a sports bra or good supportive bra for 1 week and take off only to shower.  No baths or showers during the first 24 hours after biopsy. After this time you may take a shower. It's okay if the strips get wet but do not soak them. NO saunas, hot tubs or swimming until steri-strips fall off (approx. 5 days). This prevents infection and allows time for them to completely close and heal.  DO NOT remove the steri-strips. They will fall off in 5 days. If any type of irritation (redness, itching or blisters) develops in the area around the steri-strips, remove them gently. If the steri-strips do not fall off after 5 days, gently remove them. Keep the area clean and dry.  It is normal to have mild discomfort and bruising at the biopsy site.  You may take Tylenol as needed for discomfort, as long as you have no allergies to Tylenol. Do not take aspirin, motrin, ibuprofen or any medication containing NSAID (non-steroidal anti-inflammatory drug) product for 48 hours.  DO NOT participate in strenuous activity (aerobics, heavy lifting, housework, gardening, etc.) 48 hours after your biopsy to prevent bleeding.  You will receive results in 2-3 business days.  If you are having an MRI breast biopsy or an Ultrasound guided breast biopsy, you will be billed for the biopsy and unilateral mammogram separately.  If you have any questions about the procedure or your  results, please contact the Breast Care Coordinator Nurse at [phone].  Notify your ordering physician or primary physician for increased bleeding, pain or fever over 100. Or contact a Radiology Nurse at [phone] between 8am-4pm (after 4pm, your call will be directed to the [facility]).

## 2024-10-17 NOTE — PATIENT INSTRUCTIONS
Avoid touching eyes, you can hold off on eye drops and monitor.  If start the drops, complete the entire 7 day prescription even after the symptoms have gone away.   Launder your pillow case used last night and tomorrow morning.   Apply a new clean warm moist compress to the affected eye as often as possible today. This is comforting and the warm compress increases the blood flow to the area and promotes healing.   Discard any contact lenses that were worn recently and do not wear contacts for about 7 days.   Discard any eye makeup worn recently.   If you develop any eye pain or vision changes, seek immediate care.  Follow up with your PCP if you do not continue to improve with each day.

## 2024-10-17 NOTE — IMAGING NOTE
This nurse introduced self and role of breast coordinator.  Discussed recommended breast biopsy with patient. Pt was recommended by Dr. Powell to have a right breast ultrasound guided biopsy.Super orders placed. Spouses name is Maximiliano. Pt does not have any children. Pt is  leaving out of town 10/25/25 requesting to have Bx after pt returns.  Pt wants  Nov 15 Friday. 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  1250 pm I noted in Md report pt has nipple ring in rt breast I called pt and informed pt that the nipple ring would have to be removed for the biopsy and will need to remain out for 24 hours post Bx to decrease risk for infection. Pt verbalizes\" will takes out never took nipple ring  out before will figure out how to and have it removed for the Bx\". Pt verbalizes understanding  and agreement.  RECOMMENDATIONS:      ULTRASOUND-GUIDED CORE BIOPSY: RIGHT BREAST  x 1          Pt history discussed as below:  Pt history of biopsy: Yes 2020 left was a fibroadenoma       Family history of cancer: dad prostate dx 68 great mat aunt breast can dx 70's  Pt history of breast cancer: no   Hx BCP use:   currently 15 years                 HRT use:  no ivf no          Recommedations :                      see [facility] for dictated radiology report   Reviewed pertinent patient history, family history of cancer, and patient medications.     -All herbal supplements, Vitamin E, Fish Oil    -All NSAIDs (Ibuprofen, Motrin, Advil, Aleve, or other antiinflammatory medication)  and Aspirin  81mg currently being taken    not  recommended or prescribed by  your physician  should be held for 5  days prior to biopsy.  Denies usage   -Aspirin 81 mg being taken related to a cardiac condition  or prescribed by your  physician should be held at the  direction of your physician.  Informed patient to call ordering physician for guidelines denies usage   -Blood  thinners/antiplatelet medications (Coumadin, Plavix ect) should be held at the  direction of your physician.  Informed denies usage patient to call ordering physician for guidelines  denies usage   Reviewed US guided biopsy procedure, as below.  You will be lying on your back, potentially slightly toward one side, for this procedure.  The US technician will use the ultrasound machine to locate the area in question that was seen on your previous breast imaging.  The Radiologist will then inject a local numbing medication into the area. This may burn and sting for several seconds .  Then use a needle to collect cells or tissue from the site.  A marker, or clip, will then be placed in the biopsied area.  This marker is placed so this biopsy site is able to be accurately located upon future breast imaging.  After the clip is placed, steri strips will be applied to  the biopsy site and should be kept on for 5 days.  Additional mammography films will then be taken to assure correct placement of the placed marker.    Educated the patient they will be awake during this procedure and are able to drive themselves home if they wish.  Educated patient that they should eat breakfast and park in green lot and check in with [facility] .  Educated patient that some soreness  may occur after biopsy.  Discussed use of a supportive bra and ice packs after procedure, to decrease soreness.  Tylenol only for discomfort unless they have an allergy to tylenol .  Discussed with patient no swimming, bathing,  hot tubs or submerging underwater  for 5 days post procedure until the incision is closed and healed.   Educated patient on lifting restrictions - nothing heavier than a gallon of milk for 24-48 hours after the procedure.      Discussed with patient that some soreness and bruising is normal after biopsy but that prolonged or increased pain and bruising should be reported to the ordering physician.   Educated patient that they  should bring a sports bra or form fitting bra on day of procedure. Educated patient that this helps with comfort after the biopsy and decrease swelling.  Reviewed results process with patient and shared that pathology results will be available within 2-3 business days of their biopsy.  Discussed results will be communicated by their ordering physician unless otherwise indicated.  Educated patient that once we receive an order from her physician  our radiology secretaries would be calling   to schedule the biopsy.

## 2024-11-15 ENCOUNTER — HOSPITAL ENCOUNTER (OUTPATIENT)
Dept: MAMMOGRAPHY | Facility: HOSPITAL | Age: 34
Discharge: HOME | End: 2024-11-15
Attending: NURSE PRACTITIONER
Payer: COMMERCIAL

## 2024-11-15 ENCOUNTER — HOSPITAL ENCOUNTER (OUTPATIENT)
Dept: ULTRASOUND IMAGING | Facility: HOSPITAL | Age: 34
Discharge: HOME | End: 2024-11-15
Attending: NURSE PRACTITIONER
Payer: COMMERCIAL

## 2024-11-15 DIAGNOSIS — N63.10 BREAST MASS, RIGHT: ICD-10-CM

## 2024-11-15 PROCEDURE — 88305 TISSUE EXAM BY PATHOLOGIST: CPT | Performed by: NURSE PRACTITIONER

## 2024-11-15 PROCEDURE — 19083 BX BREAST 1ST LESION US IMAG: CPT | Performed by: NURSE PRACTITIONER

## 2024-11-15 PROCEDURE — 77065 DX MAMMO INCL CAD UNI: CPT | Performed by: NURSE PRACTITIONER

## 2024-11-15 NOTE — PROCEDURES
[facility]  part of [facility]  Procedure Note    Kimi Mariscal Patient Status:  Outpatient    10/24/1990 MRN [id number]   Location [facility] Attending Chaya Fontanez APRN   Hosp Day # 0 PCP Jayme Dwyer DO     Procedure: Right breast ultrasound guided biopsy    Pre-Procedure Diagnosis:  Right breast mass    Post-Procedure Diagnosis: Right breast mass    Anesthesia:  Local    Findings:  Right breast mass    Specimens: multiple cores    Blood Loss:  minimal    Tourniquet Time: none  Complications:  None  Drains:  none        Yadira Hernandez MD  11/15/2024

## 2024-11-19 ENCOUNTER — TELEPHONE (OUTPATIENT)
Dept: ULTRASOUND IMAGING | Facility: HOSPITAL | Age: 34
End: 2024-11-19

## 2024-11-19 NOTE — TELEPHONE ENCOUNTER
Kimi Mariscal is s/p biopsy .  Phoned and introduced myself as breast coordinator .  Reinforced to patient  post biopsy care and instructions . Pt was informed Dr Dwyer want pt to follow up with  Dr Beckwith . Pt verbalizes \"my mom was seen by Dr Gillette I want to go see Dr Gillette she took care of my mom this year\" supportive care given.. 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 Pathology findings are benign and concordant.  Surgical excision is recommended for the fibroepithelial lesion, marked by Q shaped clip.          Kimi Mariscalacknowledges the above and denies questions. Kimi Mariscal was also instructed to perform breast self exams and if any changes  develops any changes to contact ordering  physician immediately  for re evaluation..  Kimi Mariscalverbalizes understanding and agreement.

## 2024-11-21 ENCOUNTER — OFFICE VISIT (OUTPATIENT)
Dept: FAMILY MEDICINE CLINIC | Facility: CLINIC | Age: 34
End: 2024-11-21

## 2024-11-21 VITALS
HEIGHT: 65 IN | DIASTOLIC BLOOD PRESSURE: 87 MMHG | HEART RATE: 79 BPM | BODY MASS INDEX: 29.82 KG/M2 | WEIGHT: 179 LBS | SYSTOLIC BLOOD PRESSURE: 124 MMHG

## 2024-11-21 DIAGNOSIS — L25.9 CONTACT DERMATITIS, UNSPECIFIED CONTACT DERMATITIS TYPE, UNSPECIFIED TRIGGER: ICD-10-CM

## 2024-11-21 DIAGNOSIS — T78.40XA ALLERGY, INITIAL ENCOUNTER: Primary | ICD-10-CM

## 2024-11-21 RX ORDER — AMLODIPINE BESYLATE 10 MG/1
TABLET ORAL
COMMUNITY

## 2024-11-21 RX ORDER — PREDNISONE 20 MG/1
40 TABLET ORAL DAILY
Qty: 6 TABLET | Refills: 0 | Status: SHIPPED | OUTPATIENT
Start: 2024-11-21 | End: 2024-11-24

## 2024-11-21 NOTE — PROGRESS NOTES
HPI:     HPI  A 34-year-old female presents with  rashes around her right breast for the past 5 days following a biopsy. The rash is red and itchy. She also has slight redness and itching in her eyebrows and cheeks.    Medications:     Current Outpatient Medications   Medication Sig Dispense Refill    amLODIPine 10 MG Oral Tab       predniSONE 20 MG Oral Tab Take 2 tablets (40 mg total) by mouth daily for 3 days. 6 tablet 0    labetalol 100 MG Oral Tab Take 1 tablet (100 mg total) by mouth 2 (two) times daily.      Norgestim-Eth Estrad Triphasic (TRI-LO-OLEKSANDR) 0.18/0.215/0.25 MG-25 MCG Oral Tab TAKE 1 TABLET BY MOUTH EVERY DAY (Patient not taking: Reported on 11/21/2024) 84 tablet 3       Allergies:   Allergies[1]    History:     Health Maintenance   Topic Date Due    Annual Depression Screening  01/01/2024    COVID-19 Vaccine (4 - 2024-25 season) 09/01/2024    Influenza Vaccine (1) 10/01/2024    Annual Physical  12/15/2024    Pap Smear  12/15/2026    DTaP,Tdap,and Td Vaccines (2 - Td or Tdap) 01/05/2034    Pneumococcal Vaccine: Birth to 64yrs  Aged Out       Patient's last menstrual period was 10/27/2024 (approximate).   Past Medical History:     Past Medical History:    Essential hypertension    GERD (gastroesophageal reflux disease)    High blood pressure    Hx of motion sickness    when in a moving vehicle    Vertigo    Visual impairment    contact lenses and glasses       Past Surgical History:     Past Surgical History:   Procedure Laterality Date    Needle biopsy left      Needle biopsy right  11/15/2024    Upper gi endoscopy,exam         Family History:     Family History   Problem Relation Age of Onset    Hypertension Father     Cancer Father         prostate    Hypertension Mother     Heart Disorder Maternal Grandmother     Lung Disorder Maternal Grandmother     Diabetes Maternal Grandfather     Cancer Maternal Grandfather     Colon Cancer Maternal Grandfather     Heart Disorder Maternal Grandfather      Other (Other) Paternal Grandmother     Other (Other) Paternal Grandfather     Pancreatic Cancer Paternal Uncle     Breast Cancer Other         mat great aunt       Social History:     Social History     Socioeconomic History    Marital status:      Spouse name: Not on file    Number of children: Not on file    Years of education: Not on file    Highest education level: Not on file   Occupational History    Not on file   Tobacco Use    Smoking status: Never    Smokeless tobacco: Never   Vaping Use    Vaping status: Never Used   Substance and Sexual Activity    Alcohol use: Yes     Alcohol/week: 0.0 standard drinks of alcohol     Comment: socially    Drug use: No    Sexual activity: Not on file   Other Topics Concern    Not on file   Social History Narrative    Not on file     Social Drivers of Health     Financial Resource Strain: Not on file   Food Insecurity: Not on file   Transportation Needs: Not on file   Physical Activity: Not on file   Stress: Not on file   Social Connections: Not on file   Housing Stability: Not on file       Review of Systems:   Review of Systems   Skin:  Positive for rash.        Vitals:    11/21/24 1412   BP: 124/87   Pulse: 79   Weight: 179 lb (81.2 kg)   Height: 5' 5\" (1.651 m)     Body mass index is 29.79 kg/m².    Physical Exam:   Physical Exam  Vitals reviewed.   Skin:     Findings: Rash present.      There are erythema macular around the right breast and mild erythema on her eyebrows and cheek.    Assessment and Plan::     Problem List Items Addressed This Visit    None  Visit Diagnoses       Allergy, initial encounter    -  Primary    Relevant Medications    predniSONE 20 MG Oral Tab    Other Relevant Orders    Allergy Referral - In Network    Contact dermatitis, unspecified contact dermatitis type, unspecified trigger        Relevant Medications    predniSONE 20 MG Oral Tab          Discussed plan of care with pt and pt is in agreement.All questions answered. Pt to call with  questions or concerns.         [1]   Allergies  Allergen Reactions    Adhesive Tape ITCHING and RASH    Triaminic Night Time [Pedia Relief Cough-Cold] HIVES     Pt has taken similar medication as an adult without issue

## 2024-12-04 ENCOUNTER — OFFICE VISIT (OUTPATIENT)
Age: 34
End: 2024-12-04
Payer: COMMERCIAL

## 2024-12-04 VITALS
TEMPERATURE: 98 F | HEIGHT: 65 IN | SYSTOLIC BLOOD PRESSURE: 135 MMHG | BODY MASS INDEX: 29.99 KG/M2 | WEIGHT: 180 LBS | RESPIRATION RATE: 16 BRPM | OXYGEN SATURATION: 100 % | DIASTOLIC BLOOD PRESSURE: 94 MMHG | HEART RATE: 76 BPM

## 2024-12-04 DIAGNOSIS — Z01.818 PRE-OP TESTING: ICD-10-CM

## 2024-12-04 DIAGNOSIS — N63.10 MASS OF RIGHT BREAST, UNSPECIFIED QUADRANT: Primary | ICD-10-CM

## 2024-12-04 NOTE — PROGRESS NOTES
Breast Surgery New Patient Consultation    This is the first visit for this 34 year old woman, referred by Dr. Dwyer, who presents for evaluation of right breast mass.    History of Present Illness:   Ms. Kimi Mariscal is a 34 year old woman who presents with history of bilateral breast biopsies.  Patient had initially been found to have a palpable concern in her contralateral breast several years ago at which time she underwent biopsy of 2 sites of fibroadenomas in her left breast.  In  she was also noted to have an 8 mm mass at the 10 o'clock position of her right breast as well as a benign-appearing mass in the 9 o'clock position of her right breast.  She had a bilateral diagnostic evaluation on 2024 and was found to have interval enlargement of the 10:00 mass up to 3.3 cm in size for which biopsy was recommended.  She had the ultrasound-guided biopsy on November 15, 2024 which confirmed fragments of a fibroepithelial lesion.  She does not have any known family history of breast cancer.  She denies any nipple discharge, skin changes or other concerns.  She does report she had a severe chlorhexidine allergy at the time of the right breast biopsy. She is here today for evaluation and recommendations for further therapy.        Past Medical History:    Essential hypertension    GERD (gastroesophageal reflux disease)    High blood pressure    Hx of motion sickness    when in a moving vehicle    Vertigo    Visual impairment    contact lenses and glasses       Past Surgical History:   Procedure Laterality Date    Needle biopsy left      Needle biopsy right  11/15/2024    Upper gi endoscopy,exam         Gynecological History:  Pt is a   She achieved menarche at age 10 and LMP 2024  She denies any history of hormone replacement therapy   She has history of oral contraceptive use for 17 years, last in .  She denies infertility treatment to achieve pregnancy.    Medications:    No  outpatient medications have been marked as taking for the 12/4/24 encounter (Appointment) with Leida Gillette MD.       Allergies:    Allergies[1]    Family History:   Family History   Problem Relation Age of Onset    Hypertension Father     Cancer Father         prostate    Hypertension Mother     Heart Disorder Maternal Grandmother     Lung Disorder Maternal Grandmother     Diabetes Maternal Grandfather     Cancer Maternal Grandfather     Colon Cancer Maternal Grandfather     Heart Disorder Maternal Grandfather     Other (Other) Paternal Grandmother     Other (Other) Paternal Grandfather     Pancreatic Cancer Paternal Uncle     Breast Cancer Other         mat great aunt       She is not of Ashkenazi Anabaptist ancestry.    Social History:  History   Alcohol Use    0.0 standard drinks of alcohol/week     Comment: socially       History   Smoking Status    Never   Smokeless Tobacco    Never     Ms. Kimi Mariscal is  with 0 children. She has 2 siblings. She is currently Employed full-time    Review of Systems:  General:   The patient denies, fever, chills, night sweats, fatigue, generalized weakness, change in appetite or weight loss.    HEENT:     The patient denies eye irritation, cataracts, redness, glaucoma, yellowing of the eyes, change in vision, color blindness, or +wearing contacts/glasses. The patient denies hearing loss, ringing in the ears, ear drainage, earaches, nasal congestion, nose bleeds, snoring, pain in mouth/throat, hoarseness, change in voice, facial trauma.    Respiratory:  The patient denies chronic cough, phlegm, hemoptysis, pleurisy/chest pain, pneumonia, asthma, wheezing, difficulty in breathing with exertion, emphysema, chronic bronchitis, shortness of breath or abnormal sound when breathing.     Cardiovascular:  There is no history of chest pain, chest pressure/discomfort, palpitations, irregular heartbeat, +fainting or near-fainting, difficulty breathing when lying flat,  SOB/Coughing at night, swelling of the legs or chest pain while walking.    Breasts:  See history of present illness    Gastrointestinal:     There is no history of difficulty or pain with swallowing, +reflux symptoms, vomiting, dark or bloody stools, constipation, yellowing of the skin, indigestion, nausea, change in bowel habits, diarrhea, abdominal pain or vomiting blood.     Genitourinary:  The patient denies frequent urination, needing to get up at night to urinate, urinary hesitancy or retaining urine, painful urination, urinary incontinence, decreased urine stream, blood in the urine or vaginal/penile discharge.    Skin:    The patient denies +rash, itching, skin lesions, dry skin, change in skin color or change in moles.     Hematologic/Lymphatic:  The patient denies easily bruising or bleeding or persistent swollen glands or lymph nodes.     Musculoskeletal:  The patient denies muscle aches/pain, joint pain, stiff joints, neck pain, back pain or bone pain.    Neuropsychiatric:  There is no history of migraines or severe headaches, seizure/epilepsy, speech problems, coordination problems, trembling/tremors, fainting/black outs, dizziness, memory problems, loss of sensation/numbness, problems walking, weakness, tingling or burning in hands/feet. There is no history of abusive relationship, bipolar disorder, sleep disturbance, anxiety, depression or feeling of despair.    Endocrine:    There is no history of poor/slow wound healing, weight loss/gain, fertility or hormone problems, cold intolerance, thyroid disease.     Allergic/Immunologic:  There is no history of hives, hay fever, angioedema or anaphylaxis.    BP (!) 135/94 (BP Location: Left arm, Patient Position: Sitting, Cuff Size: adult)   Pulse 76   Temp 98.2 °F (36.8 °C) (Temporal)   Resp 16   Ht 1.651 m (5' 5\")   Wt 81.6 kg (180 lb)   LMP 10/27/2024 (Approximate)   SpO2 100%   BMI 29.95 kg/m²     Physical Exam:  The patient is an alert,  oriented, well-nourished and  well-developed woman who appears her stated age. Her speech patterns and movements are normal. Her affect is appropriate.    HEENT: The head is normocephalic. The neck is supple. The thyroid is not enlarged and is without palpable masses/nodules. There are no palpable masses. The trachea is in the midline. Conjunctiva are clear, non-icteric.    Chest: The chest expands symmetrically. The lungs are clear to auscultation.    Heart: The rhythm is regular.  There are no murmurs, rubs, gallops or thrills.    Breasts:  Her breasts are symmetrical with a cup size 34D.  Right breast: The skin, nipple with piercing,and areola appear normal. There is no skin dimpling with movement of the pectoralis. There is no nipple retraction. No nipple discharge can be elicited. The parenchyma is mildly nodular. There is a dominant smooth and mobile palpable mass measuring approximately 3 x 3 cm at 10:00, 7 cm from the nipple. The axillary tail is normal.  Left breast:   The skin, nipple, and areola appear normal. There is no skin dimpling with movement of the pectoralis. There is no nipple retraction. No nipple discharge can be elicited. The parenchyma is mildly nodular. There are no dominant masses in the breast. The axillary tail is normal.    Abdomen:  The abdomen is soft, flat and non tender. The liver is not enlarged. There are no palpable masses.    Lymph Nodes:  The supraclavicular, axillary and cervical regions are free of significant lymphadenopathy.    Back: There is no vertebral column tenderness.    Skin: The skin appears normal. There are no suspicious appearing rashes or lesions.    Extremities: The extremities are without deformity, cyanosis or edema.    Impression:   Ms. Kimi Mariscal is a 34 year old woman presents with history of bilateral breast fibroadenomas with biopsy confirmed, enlarging right breast fibroepithelial lesion.    Discussion and Plan:  I had a discussion with the  Patient regarding her breast exam. On exam today I found palpable mass corresponding the biopsy confirmed fibroepithelial lesion in the right breast with no other clinical findings bilaterally.  I personally reviewed the recent imaging and pathology and we discussed this at length.    We did discuss that fibroadenomas are not thought to be direct precursor lesions nor are they considered to be markers of a higher risk for developing breast cancer in the future. However, fibroadenomas can grown with time and become symptomatic which can prompt surgical excision. Growth of the lesion over time warrants excision to distinguish the lesion from a proliferative Phyllodes tumor. In light of the fact that this lesion has increased in size and symptomatology and cannot be confirmed pathologically with the core biopsy alone I am recommending excision of the right breast mass.   The risks and possible complications of the procedure were explained to the patient and her family and she understood and agreed to the proposed plan. She was given ample opportunity for questions and those questions were answered to her satisfaction. She has been  encouraged to contact the office with any questions or concerns prior to her next appointment.     This note was created by [employer] voice recognition. Errors in content may be related to improper recognition by the system; efforts to review and correct have been done but errors may still exist. Please be advised the primary purpose of this note is for me to communicate medical care. Standard sentence structure is not always used. Medical terminology and medical abbreviations may be used. There may be grammatical, typographical, and automated fill ins that may have errors missed in proofreading.           [1]   Allergies  Allergen Reactions    Adhesive Tape ITCHING and RASH    Triaminic Night Time [Pedia Relief Cough-Cold] HIVES     Pt has taken similar medication as an adult without issue

## 2024-12-04 NOTE — PATIENT INSTRUCTIONS
Dr. Leida Gillette  Tel: [phone]  Fax: [phone] [facility]  [address] [place], [place] [id number] [phone]     Surgery/Procedure: Excision of right breast mass     Anesthesia:   MAC  Surgery Length:   45 minutes CPT:  62015   Wire LOC:   No   Nuc Med:   No   Tiarra Seed:  No       Dx & ICD-10: Mass of right breast, unspecified quadrant (N63.10)   Radiology Instructions: N/A   _______________________________________________________________________________    Someone must accompany you the day of the procedure to drive you home safely, because of anesthesia.  You will need an adult  to stay with you the first night following your surgery.  You must remove any kind of makeup, acrylic nails, lotions, powders, creams or deodorant.  [facility] ONLY: Pre-admission will give instruct you on when to take Gatorade and Tylenol/acetaminophen prior to your surgery, purchase 2 - 12oz bottles of regular Gatorade (NOT RED/SUGAR FREE). Otherwise, you may not eat or drink anything else after 11PM the night before surgery.    [facility]: You may not eat or drink anything after midnight the day of your surgery.   Wear comfortable clothing that can be easily removed.  If you wear dentures, contacts lenses, or any prosthesis, you will be asked to remove them.  Do not drink alcohol or smoke 24 hours prior to your procedure.  Bring a picture ID and your insurance card.  Covid-19 testing is no longer required before surgery unless you are experiencing symptoms such as fever, cough, congestion, etc.   The Pre-Admission Testing Department will call the day before to confirm your procedure, give you the time you need to arrive by and directions on where to go. They begin making calls after 2pm, if you are not contacted by 4pm, please call the surgeon's office listed above.  Do not take any blood thinners at least one week prior to the procedure/surgery. This includes aspirin, baby aspirin, Ibuprofen  products, herbal supplements, diet medications, vitamin E, fish oil and green tea supplements. Please check other supplements for these ingredients. *TYLENOL or acetaminophen is acceptable*  If you take Coumadin, Plavix, Xarelto, or Eliquis, please contact your prescribing physician for special instructions on how long to hold. If you take insulin contact your primary care physician for special instructions.  Our surgery scheduler, Nazia, will be contacting you to discuss surgery dates. If you have any questions related to scheduling your surgery, please reach out to her at [phone].  _____________________________________________________________________  PRE-OPERATIVE TESTING IF INDICATED BELOW  PLEASE COMPLETE ASAP (AT LEAST 14-21 DAYS PRIOR TO SURGERY)  [] CBC [x] BMP [] CMP [x] EKG    [] PT, PTT, INR [] Cardiac Clearance  [x] H&P Medical Clearance [] Chest X-ray     Please call Central Scheduling to schedule an appointment for pre-operative labs/tests once your orders are placed @ [phone]  Does the patient have a pacemaker or ICD?                              Faxitron/Trident in OR?  [] Yes   [x] No                               [] Yes   [x] No

## 2024-12-05 ENCOUNTER — TELEPHONE (OUTPATIENT)
Dept: FAMILY MEDICINE CLINIC | Facility: CLINIC | Age: 34
End: 2024-12-05

## 2024-12-05 NOTE — TELEPHONE ENCOUNTER
Provider Chema Damon received medical clearance request.  Patient has annual physical schedule for 12/6.   No surgery date.

## 2024-12-06 ENCOUNTER — OFFICE VISIT (OUTPATIENT)
Dept: FAMILY MEDICINE CLINIC | Facility: CLINIC | Age: 34
End: 2024-12-06

## 2024-12-06 ENCOUNTER — TELEPHONE (OUTPATIENT)
Dept: FAMILY MEDICINE CLINIC | Facility: CLINIC | Age: 34
End: 2024-12-06

## 2024-12-06 VITALS
HEART RATE: 71 BPM | DIASTOLIC BLOOD PRESSURE: 88 MMHG | HEIGHT: 65 IN | BODY MASS INDEX: 30.16 KG/M2 | SYSTOLIC BLOOD PRESSURE: 132 MMHG | WEIGHT: 181 LBS

## 2024-12-06 DIAGNOSIS — Z00.00 ROUTINE GENERAL MEDICAL EXAMINATION AT A HEALTH CARE FACILITY: Primary | ICD-10-CM

## 2024-12-06 DIAGNOSIS — I10 ESSENTIAL HYPERTENSION: ICD-10-CM

## 2024-12-06 DIAGNOSIS — E66.811 CLASS 1 OBESITY DUE TO EXCESS CALORIES WITHOUT SERIOUS COMORBIDITY WITH BODY MASS INDEX (BMI) OF 30.0 TO 30.9 IN ADULT: ICD-10-CM

## 2024-12-06 DIAGNOSIS — E66.09 CLASS 1 OBESITY DUE TO EXCESS CALORIES WITHOUT SERIOUS COMORBIDITY WITH BODY MASS INDEX (BMI) OF 30.0 TO 30.9 IN ADULT: ICD-10-CM

## 2024-12-06 RX ORDER — LABETALOL 100 MG/1
100 TABLET, FILM COATED ORAL DAILY
Qty: 90 TABLET | Refills: 3 | Status: SHIPPED | OUTPATIENT
Start: 2024-12-06 | End: 2025-03-06

## 2024-12-06 RX ORDER — AMLODIPINE BESYLATE 10 MG/1
10 TABLET ORAL DAILY
Qty: 90 TABLET | Refills: 3 | Status: SHIPPED | OUTPATIENT
Start: 2024-12-06 | End: 2025-03-06

## 2024-12-06 NOTE — PROGRESS NOTES
HPI:   Kimi Mariscal is a 34 year old female who presents for an Annual Health Visit.   The patient is currently feeling well. The patient denies chest pain, SOB, N/V/C/D, fever, dizziness, syncope, and abdominal pain. There are no other concerns today.      Allergies:   Allergies[1]    CURRENT MEDICATIONS   Current Outpatient Medications   Medication Sig Dispense Refill    amLODIPine 10 MG Oral Tab Take 1 tablet (10 mg total) by mouth daily. 90 tablet 3    labetalol 100 MG Oral Tab Take 1 tablet (100 mg total) by mouth daily. 90 tablet 3      HISTORICAL INFORMATION   Past Medical History:    Essential hypertension    GERD (gastroesophageal reflux disease)    High blood pressure    Hx of motion sickness    when in a moving vehicle    Vertigo    Visual impairment    contact lenses and glasses      Past Surgical History:   Procedure Laterality Date    Needle biopsy left      Needle biopsy right  11/15/2024    Upper gi endoscopy,exam        Family History   Problem Relation Age of Onset    Hypertension Father     Cancer Father         prostate    Hypertension Mother     Heart Disorder Maternal Grandmother     Lung Disorder Maternal Grandmother     Diabetes Maternal Grandfather     Cancer Maternal Grandfather     Colon Cancer Maternal Grandfather     Heart Disorder Maternal Grandfather     Other (Other) Paternal Grandmother     Other (Other) Paternal Grandfather     Pancreatic Cancer Paternal Uncle     Breast Cancer Other         mat great aunt      SOCIAL HISTORY   Social History     Socioeconomic History    Marital status:    Tobacco Use    Smoking status: Never    Smokeless tobacco: Never   Vaping Use    Vaping status: Never Used   Substance and Sexual Activity    Alcohol use: Yes     Alcohol/week: 0.0 standard drinks of alcohol     Comment: socially    Drug use: No     Social History     Social History Narrative    Not on file        REVIEW OF SYSTEMS:     Review of Systems   Constitutional:  Negative.    HENT: Negative.     Eyes: Negative.    Respiratory: Negative.     Cardiovascular: Negative.    Gastrointestinal: Negative.    Genitourinary: Negative.    Musculoskeletal: Negative.    Skin: Negative.    Neurological: Negative.    Psychiatric/Behavioral: Negative.           EXAM:   /88   Pulse 71   Ht 5' 5\" (1.651 m)   Wt 181 lb (82.1 kg)   LMP 11/23/2024 (Approximate)   BMI 30.12 kg/m²    Wt Readings from Last 6 Encounters:   12/06/24 181 lb (82.1 kg)   12/04/24 180 lb (81.6 kg)   11/21/24 179 lb (81.2 kg)   10/16/24 182 lb (82.6 kg)   04/10/24 170 lb (77.1 kg)   03/11/24 170 lb (77.1 kg)     Body mass index is 30.12 kg/m².    Physical Exam  Vitals reviewed.   Constitutional:       Appearance: She is well-developed.   HENT:      Head: Normocephalic and atraumatic.      Right Ear: Tympanic membrane, ear canal and external ear normal. There is no impacted cerumen.      Left Ear: Tympanic membrane, ear canal and external ear normal. There is no impacted cerumen.      Nose: Nose normal.      Mouth/Throat:      Mouth: Mucous membranes are moist.      Pharynx: Oropharynx is clear. No oropharyngeal exudate or posterior oropharyngeal erythema.   Eyes:      General:         Right eye: No discharge.         Left eye: No discharge.      Conjunctiva/sclera: Conjunctivae normal.   Cardiovascular:      Rate and Rhythm: Normal rate and regular rhythm.      Heart sounds: Normal heart sounds.   Pulmonary:      Effort: Pulmonary effort is normal.      Breath sounds: Normal breath sounds.   Abdominal:      General: Abdomen is flat. Bowel sounds are normal. There is no distension.      Palpations: Abdomen is soft.      Tenderness: There is no abdominal tenderness. There is no right CVA tenderness or left CVA tenderness.   Genitourinary:     Vagina: Normal.   Musculoskeletal:         General: Normal range of motion.      Cervical back: Normal range of motion and neck supple.   Skin:     Findings: No rash.    Neurological:      Mental Status: She is alert and oriented to person, place, and time.   Psychiatric:         Mood and Affect: Mood normal.         Behavior: Behavior normal.         Thought Content: Thought content normal.         Judgment: Judgment normal.          ASSESSMENT AND PLAN:   Claribel was seen today for routine physical.    Diagnoses and all orders for this visit:    Routine general medical examination at a health care facility  -     CBC With Differential With Platelet; Future  -     Comp Metabolic Panel (14); Future  -     Hemoglobin A1C; Future  -     Lipid Panel; Future  -     TSH W Reflex To Free T4; Future  -     Urinalysis with Culture Reflex; Future  -     Prothrombin Time (PT); Future  -     PTT, Activated [E]; Future    Class 1 obesity due to excess calories without serious comorbidity with body mass index (BMI) of 30.0 to 30.9 in adult  -     CBC With Differential With Platelet; Future  -     Comp Metabolic Panel (14); Future  -     Hemoglobin A1C; Future  -     Lipid Panel; Future  -     TSH W Reflex To Free T4; Future    Essential hypertension  -     amLODIPine 10 MG Oral Tab; Take 1 tablet (10 mg total) by mouth daily.  -     labetalol 100 MG Oral Tab; Take 1 tablet (100 mg total) by mouth daily.    Other orders  -     INFLUENZA VACCINE, TRI, PRESERV FREE, 0.5 ML      Overall health discussed, exercise/activity appropriate for age and health status, heathy diety, preventive care, and upcoming screening discussed. Routine labs ordered.    There are no Patient Instructions on file for this visit.    The patient indicates understanding of these issues and agrees to the plan.    Problem List:  Patient Active Problem List   Diagnosis    Encounter for surveillance of contraceptive pills    Sleep difficulties    Mass of right breast    Essential hypertension    Snoring    Fibroadenoma of breast, left    History of 2019 novel coronavirus disease (COVID-19)    Chronic pain of left knee    Vitamin  D deficiency    History of irregular menstrual cycles    Hirsutism    Abnormal mammogram    Class 1 obesity due to excess calories without serious comorbidity with body mass index (BMI) of 30.0 to 30.9 in adult       Solo Damon PA-C  12/6/2024  10:28 AM               [1]   Allergies  Allergen Reactions    Adhesive Tape RASH and ITCHING     USE DERMABOND NOT STERISTRIPS    Chlorhexidine RASH    Triaminic Night Time [Pedia Relief Cough-Cold] HIVES     Pt has taken similar medication as an adult without issue

## 2024-12-09 ENCOUNTER — TELEPHONE (OUTPATIENT)
Dept: SURGERY | Facility: CLINIC | Age: 34
End: 2024-12-09

## 2024-12-09 DIAGNOSIS — N63.10 MASS OF RIGHT BREAST, UNSPECIFIED QUADRANT: Primary | ICD-10-CM

## 2024-12-09 NOTE — TELEPHONE ENCOUNTER
Patient called stating that someone from the office called but did not leave a massage. Informed the patient that the only note I see is from the  that she is scheduled for surgery on 1/31/25 with Dr. Gillette. Informed patient that if they have not left massage they will probably call back. Asked patient if she has any questions. Patient stated she does not have any questions at present. Encouraged patient to call our office if she has any further questions. Patient verbalized understanding.

## 2024-12-09 NOTE — TELEPHONE ENCOUNTER
Calling pt in regards to scheduling surgery.  Informed pt that I have 12/23/2024 available patient took 01/31/2025 at [facility] with Dr. Gillette.  Pt verbalized understanding and in agreement with date and location.  All questions answered.   Encouraged pt to call or [employer] message office with any other questions or concerns.

## 2025-01-15 ENCOUNTER — TELEPHONE (OUTPATIENT)
Dept: SURGERY | Facility: CLINIC | Age: 35
End: 2025-01-15

## 2025-01-15 NOTE — TELEPHONE ENCOUNTER
Returned patient call . Patient stated that she will have her blood work and EKG for her pre admission testing done this weekend. Informed patient that once she has the testing done her PCP has to review the results and send us the clearance for her procedure. Patient verbalized understanding.

## 2025-01-15 NOTE — TELEPHONE ENCOUNTER
Called patient regarding clearance for surgery.LVM for patient to call back to inform us when she will be getting pre-op clearance.s

## 2025-01-21 DIAGNOSIS — Z30.41 ENCOUNTER FOR SURVEILLANCE OF CONTRACEPTIVE PILLS: ICD-10-CM

## 2025-01-21 RX ORDER — NORGESTIMATE AND ETHINYL ESTRADIOL
1 KIT DAILY
Qty: 84 TABLET | Refills: 3 | OUTPATIENT
Start: 2025-01-21

## 2025-01-22 ENCOUNTER — EKG ENCOUNTER (OUTPATIENT)
Dept: LAB | Age: 35
End: 2025-01-22
Payer: COMMERCIAL

## 2025-01-22 ENCOUNTER — PATIENT MESSAGE (OUTPATIENT)
Dept: FAMILY MEDICINE CLINIC | Facility: CLINIC | Age: 35
End: 2025-01-22

## 2025-01-22 ENCOUNTER — LAB ENCOUNTER (OUTPATIENT)
Dept: LAB | Age: 35
End: 2025-01-22
Payer: COMMERCIAL

## 2025-01-22 ENCOUNTER — TELEPHONE (OUTPATIENT)
Facility: CLINIC | Age: 35
End: 2025-01-22

## 2025-01-22 DIAGNOSIS — E66.09 CLASS 1 OBESITY DUE TO EXCESS CALORIES WITHOUT SERIOUS COMORBIDITY WITH BODY MASS INDEX (BMI) OF 30.0 TO 30.9 IN ADULT: ICD-10-CM

## 2025-01-22 DIAGNOSIS — E66.811 CLASS 1 OBESITY DUE TO EXCESS CALORIES WITHOUT SERIOUS COMORBIDITY WITH BODY MASS INDEX (BMI) OF 30.0 TO 30.9 IN ADULT: ICD-10-CM

## 2025-01-22 DIAGNOSIS — Z00.00 ROUTINE GENERAL MEDICAL EXAMINATION AT A HEALTH CARE FACILITY: ICD-10-CM

## 2025-01-22 DIAGNOSIS — Z01.818 PRE-OP TESTING: ICD-10-CM

## 2025-01-22 LAB
ALBUMIN SERPL-MCNC: 4.6 G/DL (ref 3.2–4.8)
ALBUMIN/GLOB SERPL: 1.4 {RATIO} (ref 1–2)
ALP LIVER SERPL-CCNC: 74 U/L
ALT SERPL-CCNC: 51 U/L
ANION GAP SERPL CALC-SCNC: 8 MMOL/L (ref 0–18)
APTT PPP: 34.4 SECONDS (ref 23–36)
AST SERPL-CCNC: 75 U/L (ref ?–34)
ATRIAL RATE: 59 BPM
BASOPHILS # BLD AUTO: 0.05 X10(3) UL (ref 0–0.2)
BASOPHILS NFR BLD AUTO: 0.8 %
BILIRUB SERPL-MCNC: 0.4 MG/DL (ref 0.3–1.2)
BILIRUB UR QL: NEGATIVE
BUN BLD-MCNC: 12 MG/DL (ref 9–23)
BUN/CREAT SERPL: 13.5 (ref 10–20)
CALCIUM BLD-MCNC: 9.6 MG/DL (ref 8.7–10.4)
CHLORIDE SERPL-SCNC: 104 MMOL/L (ref 98–112)
CHOLEST SERPL-MCNC: 186 MG/DL (ref ?–200)
CLARITY UR: CLEAR
CO2 SERPL-SCNC: 29 MMOL/L (ref 21–32)
COLOR UR: COLORLESS
CREAT BLD-MCNC: 0.89 MG/DL
DEPRECATED RDW RBC AUTO: 43.1 FL (ref 35.1–46.3)
EGFRCR SERPLBLD CKD-EPI 2021: 87 ML/MIN/1.73M2 (ref 60–?)
EOSINOPHIL # BLD AUTO: 0.13 X10(3) UL (ref 0–0.7)
EOSINOPHIL NFR BLD AUTO: 2.1 %
ERYTHROCYTE [DISTWIDTH] IN BLOOD BY AUTOMATED COUNT: 13.2 % (ref 11–15)
EST. AVERAGE GLUCOSE BLD GHB EST-MCNC: 120 MG/DL (ref 68–126)
FASTING PATIENT LIPID ANSWER: YES
FASTING STATUS PATIENT QL REPORTED: YES
GLOBULIN PLAS-MCNC: 3.2 G/DL (ref 2–3.5)
GLUCOSE BLD-MCNC: 105 MG/DL (ref 70–99)
GLUCOSE UR-MCNC: NORMAL MG/DL
HBA1C MFR BLD: 5.8 % (ref ?–5.7)
HCT VFR BLD AUTO: 40.6 %
HDLC SERPL-MCNC: 59 MG/DL (ref 40–59)
HGB BLD-MCNC: 13.4 G/DL
IMM GRANULOCYTES # BLD AUTO: 0.01 X10(3) UL (ref 0–1)
IMM GRANULOCYTES NFR BLD: 0.2 %
INR BLD: 0.94 (ref 0.8–1.2)
KETONES UR-MCNC: NEGATIVE MG/DL
LDLC SERPL CALC-MCNC: 113 MG/DL (ref ?–100)
LEUKOCYTE ESTERASE UR QL STRIP.AUTO: 25
LYMPHOCYTES # BLD AUTO: 2.49 X10(3) UL (ref 1–4)
LYMPHOCYTES NFR BLD AUTO: 41 %
MCH RBC QN AUTO: 29.4 PG (ref 26–34)
MCHC RBC AUTO-ENTMCNC: 33 G/DL (ref 31–37)
MCV RBC AUTO: 89 FL
MONOCYTES # BLD AUTO: 0.43 X10(3) UL (ref 0.1–1)
MONOCYTES NFR BLD AUTO: 7.1 %
NEUTROPHILS # BLD AUTO: 2.97 X10 (3) UL (ref 1.5–7.7)
NEUTROPHILS # BLD AUTO: 2.97 X10(3) UL (ref 1.5–7.7)
NEUTROPHILS NFR BLD AUTO: 48.8 %
NITRITE UR QL STRIP.AUTO: NEGATIVE
NONHDLC SERPL-MCNC: 127 MG/DL (ref ?–130)
OSMOLALITY SERPL CALC.SUM OF ELEC: 292 MOSM/KG (ref 275–295)
P AXIS: 35 DEGREES
P-R INTERVAL: 144 MS
PH UR: 7 [PH] (ref 5–8)
PLATELET # BLD AUTO: 278 10(3)UL (ref 150–450)
POTASSIUM SERPL-SCNC: 4.3 MMOL/L (ref 3.5–5.1)
PROT SERPL-MCNC: 7.8 G/DL (ref 5.7–8.2)
PROT UR-MCNC: NEGATIVE MG/DL
PROTHROMBIN TIME: 13.1 SECONDS (ref 11.6–14.8)
Q-T INTERVAL: 468 MS
QRS DURATION: 90 MS
QTC CALCULATION (BEZET): 463 MS
R AXIS: 77 DEGREES
RBC # BLD AUTO: 4.56 X10(6)UL
SODIUM SERPL-SCNC: 141 MMOL/L (ref 136–145)
SP GR UR STRIP: 1.01 (ref 1–1.03)
T AXIS: 55 DEGREES
TRIGL SERPL-MCNC: 74 MG/DL (ref 30–149)
TSI SER-ACNC: 1.22 UIU/ML (ref 0.55–4.78)
UROBILINOGEN UR STRIP-ACNC: NORMAL
VENTRICULAR RATE: 59 BPM
VLDLC SERPL CALC-MCNC: 13 MG/DL (ref 0–30)
WBC # BLD AUTO: 6.1 X10(3) UL (ref 4–11)

## 2025-01-22 PROCEDURE — 85610 PROTHROMBIN TIME: CPT

## 2025-01-22 PROCEDURE — 87086 URINE CULTURE/COLONY COUNT: CPT

## 2025-01-22 PROCEDURE — 85730 THROMBOPLASTIN TIME PARTIAL: CPT

## 2025-01-22 PROCEDURE — 81001 URINALYSIS AUTO W/SCOPE: CPT

## 2025-01-22 PROCEDURE — 83036 HEMOGLOBIN GLYCOSYLATED A1C: CPT

## 2025-01-22 PROCEDURE — 36415 COLL VENOUS BLD VENIPUNCTURE: CPT

## 2025-01-22 PROCEDURE — 93010 ELECTROCARDIOGRAM REPORT: CPT | Performed by: INTERNAL MEDICINE

## 2025-01-22 PROCEDURE — 93005 ELECTROCARDIOGRAM TRACING: CPT

## 2025-01-22 PROCEDURE — 80053 COMPREHEN METABOLIC PANEL: CPT

## 2025-01-22 PROCEDURE — 80061 LIPID PANEL: CPT

## 2025-01-22 PROCEDURE — 85025 COMPLETE CBC W/AUTO DIFF WBC: CPT

## 2025-01-22 PROCEDURE — 84443 ASSAY THYROID STIM HORMONE: CPT

## 2025-01-23 NOTE — TELEPHONE ENCOUNTER
[Last office visit on 12/6/24 for physical [not listed as a pre-op visit]; scheduled excision of the right breast scheduled on 1/31/25]    I left detailed message on verbal release verified and identified voicemail # [phone] made aware of need for pre-op visit with Solo Damon PA-C [last visit was an annual physical on 12/6/24; also making aware of [employer] message being sent.

## 2025-01-23 NOTE — TELEPHONE ENCOUNTER
Patient called office. Date of birth and full name both confirmed.   She has work today, but able to go to office tomorrow.     Presurgical visit scheduled for tomorrow.     Future Appointments   Date Time Provider Department Center   1/24/2025 10:00 AM Solo Damon PA-C [facility] [place]   2/5/2025  1:00 PM Leida Gillette MD [facility]

## 2025-01-24 ENCOUNTER — OFFICE VISIT (OUTPATIENT)
Dept: FAMILY MEDICINE CLINIC | Facility: CLINIC | Age: 35
End: 2025-01-24

## 2025-01-24 VITALS
HEART RATE: 63 BPM | BODY MASS INDEX: 29.99 KG/M2 | WEIGHT: 180 LBS | DIASTOLIC BLOOD PRESSURE: 74 MMHG | SYSTOLIC BLOOD PRESSURE: 113 MMHG | HEIGHT: 65 IN

## 2025-01-24 DIAGNOSIS — Z01.818 PRE-OP EXAMINATION: Primary | ICD-10-CM

## 2025-01-24 LAB
APPEARANCE: CLEAR
BILIRUBIN: NEGATIVE
GLUCOSE (URINE DIPSTICK): NEGATIVE MG/DL
KETONES (URINE DIPSTICK): NEGATIVE MG/DL
LEUKOCYTES: NEGATIVE
MULTISTIX LOT#: ABNORMAL NUMERIC
NITRITE, URINE: NEGATIVE
PH, URINE: 6.5 (ref 4.5–8)
PROTEIN (URINE DIPSTICK): NEGATIVE MG/DL
SPECIFIC GRAVITY: 1.02 (ref 1–1.03)
URINE-COLOR: YELLOW
UROBILINOGEN,SEMI-QN: 0.2 MG/DL (ref 0–1.9)

## 2025-01-24 PROCEDURE — 81002 URINALYSIS NONAUTO W/O SCOPE: CPT | Performed by: PHYSICIAN ASSISTANT

## 2025-01-24 PROCEDURE — 99213 OFFICE O/P EST LOW 20 MIN: CPT | Performed by: PHYSICIAN ASSISTANT

## 2025-01-24 NOTE — PROGRESS NOTES
HPI:     HPI  A 34-year-old woman is in the office for Pre-Op. The patient is scheduled for an excision of right breast mass on 01/31/2025.   The patient denies chest pain, SOB, N/V/C/D, fever, dizziness, syncope, and abdominal pain. There are no other concerns today.    Medications:     Current Outpatient Medications   Medication Sig Dispense Refill    amLODIPine 10 MG Oral Tab Take 1 tablet (10 mg total) by mouth daily. 90 tablet 3    labetalol 100 MG Oral Tab Take 1 tablet (100 mg total) by mouth daily. 90 tablet 3       Allergies:   Allergies[1]    History:     Health Maintenance   Topic Date Due    COVID-19 Vaccine (4 - 2024-25 season) 09/01/2024    Annual Depression Screening  01/01/2025    Annual Physical  12/06/2025    Pap Smear  12/15/2026    DTaP,Tdap,and Td Vaccines (2 - Td or Tdap) 01/05/2034    Influenza Vaccine  Completed    Pneumococcal Vaccine: Birth to 50yrs  Aged Out    Meningococcal B Vaccine  Aged Out       Patient's last menstrual period was 01/21/2025 (exact date).   Past Medical History:     Past Medical History:    Essential hypertension    GERD (gastroesophageal reflux disease)    High blood pressure    Hx of motion sickness    when in a moving vehicle    Vertigo    Visual impairment    contact lenses and glasses       Past Surgical History:     Past Surgical History:   Procedure Laterality Date    Needle biopsy left      Needle biopsy right  11/15/2024    Upper gi endoscopy,exam         Family History:     Family History   Problem Relation Age of Onset    Hypertension Father     Cancer Father         prostate    Hypertension Mother     Heart Disorder Maternal Grandmother     Lung Disorder Maternal Grandmother     Diabetes Maternal Grandfather     Cancer Maternal Grandfather     Colon Cancer Maternal Grandfather     Heart Disorder Maternal Grandfather     Other (Other) Paternal Grandmother     Other (Other) Paternal Grandfather     Pancreatic Cancer Paternal Uncle     Breast Cancer Other          mat great aunt       Social History:     Social History     Socioeconomic History    Marital status:      Spouse name: Not on file    Number of children: Not on file    Years of education: Not on file    Highest education level: Not on file   Occupational History    Not on file   Tobacco Use    Smoking status: Never    Smokeless tobacco: Never   Vaping Use    Vaping status: Never Used   Substance and Sexual Activity    Alcohol use: Yes     Alcohol/week: 0.0 standard drinks of alcohol     Comment: socially    Drug use: No    Sexual activity: Not on file   Other Topics Concern    Not on file   Social History Narrative    Not on file     Social Drivers of Health     Financial Resource Strain: Not on file   Food Insecurity: Not on file   Transportation Needs: Not on file   Physical Activity: Not on file   Stress: Not on file   Social Connections: Not on file   Housing Stability: Not on file       Review of Systems:   Review of Systems   Constitutional: Negative.  Negative for activity change, chills, fatigue and fever.   HENT: Negative.  Negative for congestion, ear discharge, ear pain, postnasal drip, rhinorrhea, sinus pressure, sinus pain and sore throat.    Eyes: Negative.    Respiratory: Negative.  Negative for cough, chest tightness, shortness of breath and wheezing.    Cardiovascular: Negative.  Negative for chest pain and palpitations.   Gastrointestinal: Negative.  Negative for abdominal distention, abdominal pain, blood in stool, constipation, diarrhea, nausea and vomiting.   Genitourinary: Negative.    Musculoskeletal: Negative.    Skin: Negative.  Negative for rash.   Neurological: Negative.    Psychiatric/Behavioral: Negative.          Vitals:    01/24/25 1003   BP: 113/74   Pulse: 63   Weight: 180 lb (81.6 kg)   Height: 5' 5\" (1.651 m)     Body mass index is 29.95 kg/m².    Physical Exam:   Physical Exam  Vitals reviewed.   Constitutional:       Appearance: She is well-developed.   HENT:       Head: Normocephalic and atraumatic.      Right Ear: Tympanic membrane, ear canal and external ear normal. There is no impacted cerumen.      Left Ear: Tympanic membrane, ear canal and external ear normal. There is no impacted cerumen.      Nose: Nose normal.      Mouth/Throat:      Mouth: Mucous membranes are moist.      Pharynx: Oropharynx is clear. No oropharyngeal exudate or posterior oropharyngeal erythema.   Eyes:      General:         Right eye: No discharge.         Left eye: No discharge.      Conjunctiva/sclera: Conjunctivae normal.   Cardiovascular:      Rate and Rhythm: Normal rate and regular rhythm.      Heart sounds: Normal heart sounds.   Pulmonary:      Effort: Pulmonary effort is normal.      Breath sounds: Normal breath sounds.   Abdominal:      General: Abdomen is flat. Bowel sounds are normal. There is no distension.      Palpations: Abdomen is soft.      Tenderness: There is no abdominal tenderness. There is no right CVA tenderness or left CVA tenderness.   Genitourinary:     Vagina: Normal.   Musculoskeletal:         General: Normal range of motion.      Cervical back: Normal range of motion and neck supple.   Skin:     Findings: No rash.   Neurological:      Mental Status: She is alert and oriented to person, place, and time.   Psychiatric:         Behavior: Behavior normal.         Thought Content: Thought content normal.         Judgment: Judgment normal.          Assessment and Plan::     Assessment & Plan  Pre-op examination    Orders:    POC Urinalysis, Manual Dip without microscopy [[id number]]  The patient is scheduled for an excision of right breast mass on 01/31/2025.    Reviewed and discussed lab results and EKG result with the patient.  The patient is cleared for surgery.         [1]   Allergies  Allergen Reactions    Adhesive Tape RASH and ITCHING     USE DERMABOND NOT STERISTRIPS    Chlorhexidine RASH    Triaminic Night Time [Pedia Relief Cough-Cold] HIVES     Pt has taken similar  medication as an adult without issue

## 2025-01-26 ENCOUNTER — TELEPHONE (OUTPATIENT)
Dept: FAMILY MEDICINE CLINIC | Facility: CLINIC | Age: 35
End: 2025-01-26

## 2025-01-27 NOTE — TELEPHONE ENCOUNTER
Pre-op notes, labs and EKG have been faxed over to surgeon's office.   Waiting to receive fax confirmation

## 2025-01-29 NOTE — DISCHARGE INSTRUCTIONS
Breast Surgery  Post-operative Instructions  Excisional Biopsy, Lumpectomy, Mastectomy, [place] Node Biopsy, or Axillary  Lymph Node Dissection  Leida Gillette MD  General Instructions  The following instructions will provide helpful information that will assist your recovery. These are designed to be general guidelines. Please remember that everyone heals and recovers differently. Listen to your body and rest when you are tired. If you have any questions or concerns, please do not hesitate to contact my office. I would like to see you in the office about one week after surgery, please schedule and appointment through my office to make a post-operative appointment if you do not already have one.     Restrictions  There are no lifting weight restrictions for the arm on the surgical side. You may gradually increase the amount of weight based on your comfort level. You should avoid a lot of repetitious activity with the arm until the drain is out (if one was placed) and the wound is well-healed (about two weeks).   You should not drive a car until you believe you can react to an emergency situation and you’re no longer taking narcotic pain medications.   You may shower the day after surgery. You should not bathe or swim (i.e. submerge wound) until the wound is well healed (about two weeks).  There are no dietary restrictions.    Exercise  You may begin arm exercises within a couple days. Do these 2 or 3 times per day, beginning with light exercise and gradually increase your range of motion and repetitions. This will help your arm regain full mobility. We will address your activity level again at your post-operative visit.   You will have pain medication prescribed before discharge. Take this as directed to relieve pain. It is important that you be comfortable so that you may continue your stretching exercises.   If you find the medication prescribed is too strong, try Tylenol (Acetaminophen) or  Ibuprofen.    Wound Care  You may remove the gauze dressing on the first or second postoperative day and then shower. You should leave the steri-strips in place; they will start to peel off about 10 days after your surgery. The stitches are all underneath the skin and will dissolve on their own. You will not need any stitches removed except if you have a drain in place.  I encourage you to shower once the outer bandage is removed, you may use soap and water directly over the steri-strips and pat dry following.  You should keep gauze dressing on the wound until the wound is completely dry and without drainage-usually 1-3 days.   If a surgical bra was placed after the surgery, I encourage you to wear it as much as possible during the week following the procedure (including during sleep). Alternatively, you may choose to wear your own bra provided it is comfortable, provides support and does not have an underwire. If the breast doesn’t move it is less painful.  If an elastic bandage was placed around your chest after the surgery you may remove it on the 1st or 2nd day after surgery. If you prefer to leave it on longer, you may.  It is normal to feel a lump in the area of the incisions for up to 6 months. This is part of the healing process. Eventually the breast will return to its normal condition.   Pain Medication  You will be given a prescription for a narcotic pain medication (usually Norco) upon discharge. Many patients have very little pain and don’t want to use the narcotic. Don’t be afraid to use it if you’re uncomfortable. If you’d prefer you may substitute Tylenol or Ibuprofen (Motrin, Advil). Using an ice pack for a few minutes over the incision can also alleviate pain. If you do use the narcotic medication, use an over the counter stool softener or gentle laxative and stay well-hydrated as constipation is not uncommon with narcotics.    Pathology Report  The Pathology report is usually available 4-5  business days following the surgery. I will call you  with the results once the report is available.    Notify my office if:   Your temperature is over 101.5 F   You notice increasing swelling, redness, warmth or drainage from around the incision or drain site.    If you experience any problems please call my office and either my nurse or myself will respond. After hours, you will be forwarded to my answering service which will help you get in touch with myself or the physician covering for me.     HOME INSTRUCTIONS  [facility] CARE INSTRUCTIONS: POST-OP ANESTHESIA  The medication that you received for sedation or general anesthesia can last up to 24 hours. Your judgment and reflexes may be altered, even if you feel like your normal self.      We Recommend:   Do not drive any motor vehicle or bicycle   Avoid mowing the lawn, playing sports, or working with power tools/applicances (power saws, electric knives or mixers)   That you have someone stay with you on your first night home   Do not drink alcohol or take sleeping pills or tranquilizers   Do not sign legal documents within 24 hours of your procedure   If you had a nerve block for your surgery, take extra care not to put any pressure on your arm or hand for 24 hours    It is normal:  For you to have a sore throat if you had a breathing tube during surgery (while you were asleep!). The sore throat should get better within 48 hours. You can gargle with warm salt water (1/2 tsp in 4 oz warm water) or use a throat lozenge for comfort  To feel muscle aches or soreness especially in the abdomen, chest or neck. The achy feeling should go away in the next 24 hours  To feel weak, sleepy or \"wiped out\". Your should start feeling better in the next 24 hours.   To experience mild discomforts such as sore lip or tongue, headache, cramps, gas pains or a bloated feeling in your abdomen.   To experience mild back pain or soreness for a day or two if you had spinal or  epidural anesthesia.   If you had laparoscopic surgery, to feel shoulder pain or discomfort on the day of surgery.   For some patients to have nausea after surgery/anesthesia    If you feel nausea or experience vomiting:   Try to move around less.   Eat less than usual or drink only liquids until the next morning   Nausea should resolve in about 24 hours    If you have a problem when you are at home:    Call your surgeons office   Discharge Instructions: After Your Surgery  You’ve just had surgery. During surgery, you were given medicine called anesthesia to keep you relaxed and free of pain. After surgery, you may have some pain or nausea. This is common. Here are some tips for feeling better and getting well after surgery.   Going home  Your healthcare provider will show you how to take care of yourself when you go home. They'll also answer your questions. Have an adult family member or friend drive you home. For the first 24 hours after your surgery:   Don't drive or use heavy equipment.  Don't make important decisions or sign legal papers.  Take medicines as directed.  Don't drink alcohol.  Have someone stay with you, if needed. They can watch for problems and help keep you safe.  Be sure to go to all follow-up visits with your healthcare provider. And rest after your surgery for as long as your provider tells you to.   Coping with pain  If you have pain after surgery, pain medicine will help you feel better. Take it as directed, before pain becomes severe. Also, ask your healthcare provider or pharmacist about other ways to control pain. This might be with heat, ice, or relaxation. And follow any other instructions your surgeon or nurse gives you.      Stay on schedule with your medicine.     Tips for taking pain medicine  To get the best relief possible, remember these points:   Pain medicines can upset your stomach. Taking them with a little food may help.  Most pain relievers taken by mouth need at least 20  to 30 minutes to start to work.  Don't wait till your pain becomes severe before you take your medicine. Try to time your medicine so that you can take it before starting an activity. This might be before you get dressed, go for a walk, or sit down for dinner.  Constipation is a common side effect of some pain medicines. Call your healthcare provider before taking any medicines such as laxatives or stool softeners to help ease constipation. Also ask if you should skip any foods. Drinking lots of fluids and eating foods such as fruits and vegetables that are high in fiber can also help. Remember, don't take laxatives unless your surgeon has prescribed them.  Drinking alcohol and taking pain medicine can cause dizziness and slow your breathing. It can even be deadly. Don't drink alcohol while taking pain medicine.  Pain medicine can make you react more slowly to things. Don't drive or run machinery while taking pain medicine.  Your healthcare provider may tell you to take acetaminophen to help ease your pain. Ask them how much you're supposed to take each day. Acetaminophen or other pain relievers may interact with your prescription medicines or other over-the-counter (OTC) medicines. Some prescription medicines have acetaminophen and other ingredients in them. Using both prescription and OTC acetaminophen for pain can cause you to accidentally overdose. Read the labels on your OTC medicines with care. This will help you to clearly know the list of ingredients, how much to take, and any warnings. It may also help you not take too much acetaminophen. If you have questions or don't understand the information, ask your pharmacist or healthcare provider to explain it to you before you take the OTC medicine.   Managing nausea  Some people have an upset stomach (nausea) after surgery. This is often because of anesthesia, pain, or pain medicine, less movement of food in the stomach, or the stress of surgery. These tips will  help you handle nausea and eat healthy foods as you get better. If you were on a special food plan before surgery, ask your healthcare provider if you should follow it while you get better. Check with your provider on how your eating should progress. It may depend on the surgery you had. These general tips may help:   Don't push yourself to eat. Your body will tell you when to eat and how much.  Start off with clear liquids and soup. They're easier to digest.  Next try semi-solid foods as you feel ready. These include mashed potatoes, applesauce, and gelatin.  Slowly move to solid foods. Don’t eat fatty, rich, or spicy foods at first.  Don't force yourself to have 3 large meals a day. Instead eat smaller amounts more often.  Take pain medicines with a small amount of solid food, such as crackers or toast. This helps prevent nausea.  When to call your healthcare provider  Call your healthcare provider right away if you have any of these:   You still have too much pain, or the pain gets worse, after taking the medicine. The medicine may not be strong enough. Or there may be a complication from the surgery.  You feel too sleepy, dizzy, or groggy. The medicine may be too strong.  Side effects such as nausea or vomiting. Your healthcare provider may advise taking other medicines to .  Skin changes such as rash, itching, or hives. This may mean you have an allergic reaction. Your provider may advise taking other medicines.  The incision looks different (for instance, part of it opens up).  Bleeding or fluid leaking from the incision site, and weren't told to expect that.  Fever of 100.4°F (38°C) or higher, or as directed by your provider.  Call 911  Call 911 right away if you have:   Trouble breathing  Facial swelling    If you have obstructive sleep apnea   You were given anesthesia medicine during surgery to keep you comfortable and free of pain. After surgery, you may have more apnea spells because of this medicine and  other medicines you were given. The spells may last longer than normal.    At home:  Keep using the continuous positive airway pressure (CPAP) device when you sleep. Unless your healthcare provider tells you not to, use it when you sleep, day or night. CPAP is a common device used to treat obstructive sleep apnea.  Talk with your provider before taking any pain medicine, muscle relaxants, or sedatives. Your provider will tell you about the possible dangers of taking these medicines.  Contact your provider if your sleeping changes a lot even when taking medicines as directed.

## 2025-01-31 ENCOUNTER — HOSPITAL ENCOUNTER (OUTPATIENT)
Facility: HOSPITAL | Age: 35
Setting detail: HOSPITAL OUTPATIENT SURGERY
Discharge: HOME | End: 2025-01-31
Attending: SURGERY | Admitting: SURGERY
Payer: COMMERCIAL

## 2025-01-31 ENCOUNTER — ANESTHESIA (OUTPATIENT)
Dept: SURGERY | Facility: HOSPITAL | Age: 35
End: 2025-01-31
Payer: COMMERCIAL

## 2025-01-31 ENCOUNTER — ANESTHESIA EVENT (OUTPATIENT)
Dept: SURGERY | Facility: HOSPITAL | Age: 35
End: 2025-01-31
Payer: COMMERCIAL

## 2025-01-31 VITALS
RESPIRATION RATE: 19 BRPM | OXYGEN SATURATION: 94 % | DIASTOLIC BLOOD PRESSURE: 75 MMHG | SYSTOLIC BLOOD PRESSURE: 119 MMHG | HEART RATE: 67 BPM | BODY MASS INDEX: 29.66 KG/M2 | WEIGHT: 178 LBS | TEMPERATURE: 98 F | HEIGHT: 65 IN

## 2025-01-31 DIAGNOSIS — N63.10 MASS OF RIGHT BREAST, UNSPECIFIED QUADRANT: Primary | ICD-10-CM

## 2025-01-31 LAB — B-HCG UR QL: NEGATIVE

## 2025-01-31 PROCEDURE — 88300 SURGICAL PATH GROSS: CPT | Performed by: SURGERY

## 2025-01-31 PROCEDURE — 88307 TISSUE EXAM BY PATHOLOGIST: CPT | Performed by: SURGERY

## 2025-01-31 PROCEDURE — 81025 URINE PREGNANCY TEST: CPT

## 2025-01-31 PROCEDURE — 0HBT0ZX EXCISION OF RIGHT BREAST, OPEN APPROACH, DIAGNOSTIC: ICD-10-PCS | Performed by: SURGERY

## 2025-01-31 RX ORDER — ONDANSETRON 2 MG/ML
4 INJECTION INTRAMUSCULAR; INTRAVENOUS EVERY 6 HOURS PRN
Status: DISCONTINUED | OUTPATIENT
Start: 2025-01-31 | End: 2025-01-31

## 2025-01-31 RX ORDER — LIDOCAINE HYDROCHLORIDE AND EPINEPHRINE 10; 10 MG/ML; UG/ML
INJECTION, SOLUTION INFILTRATION; PERINEURAL AS NEEDED
Status: DISCONTINUED | OUTPATIENT
Start: 2025-01-31 | End: 2025-01-31 | Stop reason: HOSPADM

## 2025-01-31 RX ORDER — HYDROMORPHONE HYDROCHLORIDE 1 MG/ML
0.2 INJECTION, SOLUTION INTRAMUSCULAR; INTRAVENOUS; SUBCUTANEOUS EVERY 5 MIN PRN
Status: DISCONTINUED | OUTPATIENT
Start: 2025-01-31 | End: 2025-01-31

## 2025-01-31 RX ORDER — KETOROLAC TROMETHAMINE 30 MG/ML
INJECTION, SOLUTION INTRAMUSCULAR; INTRAVENOUS AS NEEDED
Status: DISCONTINUED | OUTPATIENT
Start: 2025-01-31 | End: 2025-01-31 | Stop reason: SURG

## 2025-01-31 RX ORDER — MORPHINE SULFATE 4 MG/ML
4 INJECTION, SOLUTION INTRAMUSCULAR; INTRAVENOUS EVERY 10 MIN PRN
Status: DISCONTINUED | OUTPATIENT
Start: 2025-01-31 | End: 2025-01-31

## 2025-01-31 RX ORDER — MORPHINE SULFATE 4 MG/ML
2 INJECTION, SOLUTION INTRAMUSCULAR; INTRAVENOUS EVERY 10 MIN PRN
Status: DISCONTINUED | OUTPATIENT
Start: 2025-01-31 | End: 2025-01-31

## 2025-01-31 RX ORDER — NALOXONE HYDROCHLORIDE 0.4 MG/ML
80 INJECTION, SOLUTION INTRAMUSCULAR; INTRAVENOUS; SUBCUTANEOUS AS NEEDED
Status: DISCONTINUED | OUTPATIENT
Start: 2025-01-31 | End: 2025-01-31

## 2025-01-31 RX ORDER — SODIUM CHLORIDE, SODIUM LACTATE, POTASSIUM CHLORIDE, CALCIUM CHLORIDE 600; 310; 30; 20 MG/100ML; MG/100ML; MG/100ML; MG/100ML
INJECTION, SOLUTION INTRAVENOUS CONTINUOUS
Status: DISCONTINUED | OUTPATIENT
Start: 2025-01-31 | End: 2025-01-31

## 2025-01-31 RX ORDER — METOPROLOL TARTRATE 1 MG/ML
2.5 INJECTION, SOLUTION INTRAVENOUS ONCE
Status: DISCONTINUED | OUTPATIENT
Start: 2025-01-31 | End: 2025-01-31

## 2025-01-31 RX ORDER — MIDAZOLAM HYDROCHLORIDE 1 MG/ML
INJECTION INTRAMUSCULAR; INTRAVENOUS AS NEEDED
Status: DISCONTINUED | OUTPATIENT
Start: 2025-01-31 | End: 2025-01-31 | Stop reason: SURG

## 2025-01-31 RX ORDER — ONDANSETRON 2 MG/ML
INJECTION INTRAMUSCULAR; INTRAVENOUS AS NEEDED
Status: DISCONTINUED | OUTPATIENT
Start: 2025-01-31 | End: 2025-01-31 | Stop reason: SURG

## 2025-01-31 RX ORDER — BUPIVACAINE HYDROCHLORIDE 5 MG/ML
INJECTION, SOLUTION EPIDURAL; INTRACAUDAL AS NEEDED
Status: DISCONTINUED | OUTPATIENT
Start: 2025-01-31 | End: 2025-01-31 | Stop reason: HOSPADM

## 2025-01-31 RX ORDER — SCOPOLAMINE 1 MG/3D
1 PATCH, EXTENDED RELEASE TRANSDERMAL
Status: DISCONTINUED | OUTPATIENT
Start: 2025-01-31 | End: 2025-01-31 | Stop reason: HOSPADM

## 2025-01-31 RX ORDER — PROCHLORPERAZINE EDISYLATE 5 MG/ML
5 INJECTION INTRAMUSCULAR; INTRAVENOUS EVERY 8 HOURS PRN
Status: DISCONTINUED | OUTPATIENT
Start: 2025-01-31 | End: 2025-01-31

## 2025-01-31 RX ORDER — ACETAMINOPHEN 500 MG
1000 TABLET ORAL ONCE
Status: COMPLETED | OUTPATIENT
Start: 2025-01-31 | End: 2025-01-31

## 2025-01-31 RX ORDER — HYDROMORPHONE HYDROCHLORIDE 1 MG/ML
0.4 INJECTION, SOLUTION INTRAMUSCULAR; INTRAVENOUS; SUBCUTANEOUS EVERY 5 MIN PRN
Status: DISCONTINUED | OUTPATIENT
Start: 2025-01-31 | End: 2025-01-31

## 2025-01-31 RX ORDER — DEXAMETHASONE SODIUM PHOSPHATE 4 MG/ML
VIAL (ML) INJECTION AS NEEDED
Status: DISCONTINUED | OUTPATIENT
Start: 2025-01-31 | End: 2025-01-31 | Stop reason: SURG

## 2025-01-31 RX ORDER — LIDOCAINE HYDROCHLORIDE 10 MG/ML
INJECTION, SOLUTION EPIDURAL; INFILTRATION; INTRACAUDAL; PERINEURAL AS NEEDED
Status: DISCONTINUED | OUTPATIENT
Start: 2025-01-31 | End: 2025-01-31 | Stop reason: SURG

## 2025-01-31 RX ORDER — DIPHENHYDRAMINE HYDROCHLORIDE 50 MG/ML
25 INJECTION INTRAMUSCULAR; INTRAVENOUS EVERY 10 MIN PRN
Status: DISCONTINUED | OUTPATIENT
Start: 2025-01-31 | End: 2025-01-31

## 2025-01-31 RX ORDER — METOPROLOL TARTRATE 25 MG/1
25 TABLET, FILM COATED ORAL ONCE AS NEEDED
Status: DISCONTINUED | OUTPATIENT
Start: 2025-01-31 | End: 2025-01-31 | Stop reason: HOSPADM

## 2025-01-31 RX ORDER — HYDROCODONE BITARTRATE AND ACETAMINOPHEN 5; 325 MG/1; MG/1
1-2 TABLET ORAL EVERY 6 HOURS PRN
Qty: 20 TABLET | Refills: 0 | Status: SHIPPED | OUTPATIENT
Start: 2025-01-31 | End: 2025-02-05 | Stop reason: ALTCHOICE

## 2025-01-31 RX ADMIN — SODIUM CHLORIDE, SODIUM LACTATE, POTASSIUM CHLORIDE, CALCIUM CHLORIDE: 600; 310; 30; 20 INJECTION, SOLUTION INTRAVENOUS at 07:59:00

## 2025-01-31 RX ADMIN — MIDAZOLAM HYDROCHLORIDE 2 MG: 1 INJECTION INTRAMUSCULAR; INTRAVENOUS at 07:30:00

## 2025-01-31 RX ADMIN — LIDOCAINE HYDROCHLORIDE 50 MG: 10 INJECTION, SOLUTION EPIDURAL; INFILTRATION; INTRACAUDAL; PERINEURAL at 07:32:00

## 2025-01-31 RX ADMIN — SODIUM CHLORIDE, SODIUM LACTATE, POTASSIUM CHLORIDE, CALCIUM CHLORIDE: 600; 310; 30; 20 INJECTION, SOLUTION INTRAVENOUS at 08:17:00

## 2025-01-31 RX ADMIN — DEXAMETHASONE SODIUM PHOSPHATE 8 MG: 4 MG/ML VIAL (ML) INJECTION at 07:47:00

## 2025-01-31 RX ADMIN — SODIUM CHLORIDE, SODIUM LACTATE, POTASSIUM CHLORIDE, CALCIUM CHLORIDE: 600; 310; 30; 20 INJECTION, SOLUTION INTRAVENOUS at 07:30:00

## 2025-01-31 RX ADMIN — KETOROLAC TROMETHAMINE 30 MG: 30 INJECTION, SOLUTION INTRAMUSCULAR; INTRAVENOUS at 07:51:00

## 2025-01-31 RX ADMIN — ONDANSETRON 4 MG: 2 INJECTION INTRAMUSCULAR; INTRAVENOUS at 07:52:00

## 2025-01-31 NOTE — ANESTHESIA PROCEDURE NOTES
Airway  Date/Time: 1/31/2025 7:43 AM  Urgency: Elective    Airway not difficult    General Information and Staff    Patient location during procedure: OR  Anesthesiologist: Stcay Fields MD  Performed: anesthesiologist   Performed by: Stacy Fields MD  Authorized by: Stacy Fields MD      Indications and Patient Condition  Indications for airway management: anesthesia  Sedation level: deep  Preoxygenated: yes  Patient position: sniffing  Mask difficulty assessment: 1 - vent by mask  Planned trial extubation    Final Airway Details  Final airway type: supraglottic airway      Successful airway: classic  Size 4       Number of attempts at approach: 1  Number of other approaches attempted: 0    Additional Comments  LMA placed easily on first attempt, no dental or soft tissue damage. No signs of aspiration.

## 2025-01-31 NOTE — OPERATIVE REPORT
[facility], [place]    PATIENT'S NAME: MAHAMED GARCIA   ATTENDING PHYSICIAN: Leida Gillette MD   OPERATING PHYSICIAN: Leida Gillette MD   PATIENT ACCOUNT#:   [id number]    LOCATION:  [facility] PACU 4 [facility] 10  MEDICAL RECORD #:   [id number]       YOB: 1990  ADMISSION DATE:       01/31/2025      OPERATION DATE:  01/31/2025    OPERATIVE REPORT    PREOPERATIVE DIAGNOSIS:  Right breast mass.  POSTOPERATIVE DIAGNOSIS:  Right breast mass.  PROCEDURE:  Excision of right breast mass.      ASSISTANT:  Stephanie Zhou CSA    ANESTHESIA:  General anesthesia and local.    ESTIMATED BLOOD LOSS:  5 mL.    DRAINS:  None.    COMPLICATIONS:  None.    DISPOSITION:  Stable on transfer to recovery room.    INDICATIONS:  The patient is a 34-year-old female who presents with a history of bilateral breast biopsies.  She was found to have interval enlargement of a right breast upper outer quadrant mass that was biopsied, confirming fragments of a fibroepithelial lesion.  Given interval size, shape, and inability to distinguish a fibroadenoma from a phyllodes tumor in this location, excision of the right breast mass has been recommended.  Risks and possible complications were discussed with the patient, including, but not limited to, infection, bleeding, injury to surrounding structures, possible need for reoperation.  She agreed to the proposed surgery.    OPERATIVE TECHNIQUE:  Patient was brought to the OR, placed in a supine position, properly padded and secured, given a dose of IV antibiotics, and sequential compression devices were applied to the legs for DVT prophylaxis.  Anesthesia was induced.  The right breast was prepped and draped in the usual sterile fashion.  Lidocaine 1% with epinephrine was used to infiltrate the skin and subcutaneous tissues at the targeted incision site.  A curvilinear incision was made along the superolateral areolar border with a 15 blade knife in the skin.  The mass was  identified at 10 o'clock, brought into the field, and excised.  Sent for routine permanent pathologic evaluation.  Wound was irrigated, hemostasis assured with electrocautery, and closure was accomplished with an interrupted 3-0 Vicryl for deep layer and a running 4-0 subcuticular Monocryl for skin.  Mastisol and Steri-Strips were applied.  Marcaine 0.5% was instilled in the cavity to assist with postoperative analgesia.  A sterile dressing and compression bra were placed.  Blood loss was minimal.  All counts were correct at the conclusion of the procedure.  She tolerated the procedure well.  She was transferred to the recovery area in stable condition.    Dictated By Leida Gillette MD  d: 01/31/2025 08:03:11  t: 01/31/2025 08:45:31  [facility] [id number]  CMG/    cc: DO Solo Boland PA-C Karen Kowalczyk, APN

## 2025-01-31 NOTE — BRIEF OP NOTE
Pre-Operative Diagnosis: Mass of right breast, unspecified quadrant [N63.10]     Post-Operative Diagnosis: Mass of right breast, unspecified quadrant [N63.10]      Procedure Performed:   Excision of right breast mass    Surgeons and Role:     * Leida Gillette MD - Primary    Assistant(s):  Surgical Assistant.: Stephanie Zhou CSA     Surgical Findings: Mass identified at 10:00     Specimen: 10:00 right breast mass     Estimated Blood Loss: 5cc    Leida Gillette MD  1/31/2025  7:29 AM

## 2025-01-31 NOTE — ANESTHESIA POSTPROCEDURE EVALUATION
Patient: Kimi Mariscal    Procedure Summary       Date: 01/31/25 Room / Location: [facility] MAIN OR 02 / [facility] MAIN OR    Anesthesia Start: 0729 Anesthesia Stop: 0819    Procedure: Excision of right breast mass (Right: Breast) Diagnosis:       Mass of right breast, unspecified quadrant      (Mass of right breast, unspecified quadrant [N63.10])    Surgeons: Leida Gillette MD Anesthesiologist: Stacy Fields MD    Anesthesia Type: general ASA Status: 1            Anesthesia Type: general    Vitals Value Taken Time   /81 01/31/25 0818   Temp 97 °F (36.1 °C) 01/31/25 0818   Pulse 78 01/31/25 0818   Resp 12 01/31/25 0818   SpO2 95 % 01/31/25 0818   Vitals shown include unfiled device data.    [facility] AN Post Evaluation:   Patient Evaluated in PACU  Patient Participation: complete - patient participated  Level of Consciousness: awake and alert  Pain Score: 0  Pain Management: adequate  Airway Patency:patent  Dental exam unchanged from preop  Yes    Nausea/Vomiting: none  Cardiovascular Status: hemodynamically stable and acceptable  Respiratory Status: acceptable, nonlabored ventilation, spontaneous ventilation and nasal cannula  Postoperative Hydration acceptable  Comments: Awake in PACU, breathing easily through nasal cannula. Denies pain. Scopolamine patch in place for PONV prophylaxis      Stacy Fields MD  1/31/2025 8:19 AM

## 2025-01-31 NOTE — ANESTHESIA PREPROCEDURE EVALUATION
Anesthesia PreOp Note    HPI:     Kimi Mariscal is a 34 year old female who presents for preoperative consultation requested by: Leida Gillette MD    Date of Surgery: 1/31/2025    Procedure(s):  Excision of right breast mass  Indication: Mass of right breast, unspecified quadrant [N63.10]    Relevant Problems   No relevant active problems       NPO:  Last Liquid Consumption Date: 01/30/25  Last Liquid Consumption Time: 2359  Last Solid Consumption Date: 01/30/25  Last Solid Consumption Time: 2200  Last Liquid Consumption Date: 01/30/25          History Review:  Patient Active Problem List    Diagnosis Date Noted    Class 1 obesity due to excess calories without serious comorbidity with body mass index (BMI) of 30.0 to 30.9 in adult 12/06/2024    Abnormal mammogram 09/20/2024    Vitamin D deficiency 11/29/2023    History of irregular menstrual cycles 11/29/2023    Hirsutism 11/29/2023    History of 2019 novel coronavirus disease (COVID-19) 11/23/2021    Chronic pain of left knee 11/23/2021    Fibroadenoma of breast, left 07/20/2021    Essential hypertension 05/13/2021    Snoring 05/13/2021    Mass of right breast 11/27/2020    Encounter for surveillance of contraceptive pills 01/31/2019    Sleep difficulties 01/31/2019       Past Medical History:    Essential hypertension    GERD (gastroesophageal reflux disease)    High blood pressure    Hx of motion sickness    when in a moving vehicle    Vertigo    Visual impairment    contact lenses and glasses       Past Surgical History:   Procedure Laterality Date    Needle biopsy left      Needle biopsy right  11/15/2024    Upper gi endoscopy,exam         Prescriptions Prior to Admission[1]  Current Medications and Prescriptions Ordered in Epic[2]    Allergies[3]    Family History   Problem Relation Age of Onset    Hypertension Father     Cancer Father         prostate    Hypertension Mother     Heart Disorder Maternal Grandmother     Lung Disorder Maternal  Grandmother     Diabetes Maternal Grandfather     Cancer Maternal Grandfather     Colon Cancer Maternal Grandfather     Heart Disorder Maternal Grandfather     Other (Other) Paternal Grandmother     Other (Other) Paternal Grandfather     Pancreatic Cancer Paternal Uncle     Breast Cancer Other         mat great aunt     Social History     Socioeconomic History    Marital status:    Tobacco Use    Smoking status: Never    Smokeless tobacco: Never   Vaping Use    Vaping status: Never Used   Substance and Sexual Activity    Alcohol use: Yes     Alcohol/week: 0.0 standard drinks of alcohol     Comment: socially    Drug use: No       Available pre-op labs reviewed.  Lab Results   Component Value Date    WBC 6.1 01/22/2025    RBC 4.56 01/22/2025    HGB 13.4 01/22/2025    HCT 40.6 01/22/2025    MCV 89.0 01/22/2025    MCH 29.4 01/22/2025    MCHC 33.0 01/22/2025    RDW 13.2 01/22/2025    .0 01/22/2025    URINEPREG Negative 01/31/2025     Lab Results   Component Value Date     01/22/2025    K 4.3 01/22/2025     01/22/2025    CO2 29.0 01/22/2025    BUN 12 01/22/2025    CREATSERUM 0.89 01/22/2025     (H) 01/22/2025    CA 9.6 01/22/2025     Lab Results   Component Value Date    INR 0.94 01/22/2025       Vital Signs:  Body mass index is 29.62 kg/m².   height is 1.651 m (5' 5\") and weight is 80.7 kg (178 lb). Her oral temperature is 97.7 °F (36.5 °C). Her blood pressure is 127/76 and her pulse is 76. Her respiration is 16 and oxygen saturation is 97%.   Vitals:    01/28/25 1501 01/31/25 0639   BP:  127/76   Pulse:  76   Resp:  16   Temp:  97.7 °F (36.5 °C)   TempSrc:  Oral   SpO2:  97%   Weight: 79.4 kg (175 lb) 80.7 kg (178 lb)   Height: 1.651 m (5' 5\")         Anesthesia Evaluation     Patient summary reviewed and Nursing notes reviewed    No history of anesthetic complications   Airway   Mallampati: I  TM distance: >3 FB  Neck ROM: full  Dental      Comment: No loose teeth      Pulmonary -  negative ROS and normal exam     ROS comment: Denies smoking or marijuana use   Cardiovascular - normal exam  Exercise tolerance: good  (+) hypertension    ECG reviewed  ROS comment: No recent chest pain    Neuro/Psych - negative ROS     GI/Hepatic/Renal    (+) GERD well controlled    Endo/Other - negative ROS   Abdominal  - normal exam                 Anesthesia Plan:   ASA:  1  Plan:   MAC  Post-op Pain Management: IV analgesics  Plan Comments: Urine pregnancy test negative today.   PONV prophylaxis with Scopolamine, Decadron and Ondansetron.   Possibility of GA discussed. All anesthetic questions answered.     Informed Consent Plan and Risks Discussed With:  Patient  Discussed plan with:  Surgeon      I have informed Kimi Mariscal and/or legal guardian or family member of the nature of the anesthetic plan, benefits, risks including possible dental damage if relevant, major complications, and any alternative forms of anesthetic management.   All of the patient's questions were answered to the best of my ability. The patient desires the anesthetic management as planned.  Stacy Fields MD  1/31/2025 7:22 AM  Present on Admission:  **None**           [1]   Medications Prior to Admission   Medication Sig Dispense Refill Last Dose/Taking    amLODIPine 10 MG Oral Tab Take 1 tablet (10 mg total) by mouth daily. 90 tablet 3 1/30/2025 at 11:59 PM    labetalol 100 MG Oral Tab Take 1 tablet (100 mg total) by mouth daily. 90 tablet 3 1/30/2025 at 11:59 PM   [2]   Current Facility-Administered Medications Ordered in Epic   Medication Dose Route Frequency Provider Last Rate Last Admin    lactated ringers infusion   Intravenous Continuous Leida Gillette MD 20 mL/hr at 01/31/25 0702 New Bag at 01/31/25 0702    metoprolol tartrate (Lopressor) tab 25 mg  25 mg Oral Once PRN Leida Gillette MD        ceFAZolin (Ancef) 2g in 10mL IV syringe premix  2 g Intravenous Once Leida Gillette MD        scopolamine  (Transderm-Scop) 1 MG/3DAYS patch 1 patch  1 patch Transdermal Q72H Stacy Fields MD   1 patch at 01/31/25 0720     No current Epic-ordered outpatient medications on file.   [3]   Allergies  Allergen Reactions    Adhesive Tape RASH and ITCHING     USE DERMABOND NOT STERI STRIPS    Chlorhexidine RASH    Triaminic Night Time [Pedia Relief Cough-Cold] HIVES     Pt has taken similar medication as an adult without issue

## 2025-01-31 NOTE — H&P
History of Present Illness:   Ms. Kimi Mariscal is a 34 year old woman who presents with history of bilateral breast biopsies.  Patient had initially been found to have a palpable concern in her contralateral breast several years ago at which time she underwent biopsy of 2 sites of fibroadenomas in her left breast.  In  she was also noted to have an 8 mm mass at the 10 o'clock position of her right breast as well as a benign-appearing mass in the 9 o'clock position of her right breast.  She had a bilateral diagnostic evaluation on 2024 and was found to have interval enlargement of the 10:00 mass up to 3.3 cm in size for which biopsy was recommended.  She had the ultrasound-guided biopsy on November 15, 2024 which confirmed fragments of a fibroepithelial lesion.  She does not have any known family history of breast cancer.  She denies any nipple discharge, skin changes or other concerns.  She does report she had a severe chlorhexidine allergy at the time of the right breast biopsy. She is here today for evaluation and recommendations for further therapy.        Past Medical History       Past Medical History:    Essential hypertension    GERD (gastroesophageal reflux disease)    High blood pressure    Hx of motion sickness     when in a moving vehicle    Vertigo    Visual impairment     contact lenses and glasses            Past Surgical History         Past Surgical History:   Procedure Laterality Date    Needle biopsy left        Needle biopsy right   11/15/2024    Upper gi endoscopy,exam                Gynecological History:  Pt is a   She achieved menarche at age 10 and LMP 2024  She denies any history of hormone replacement therapy   She has history of oral contraceptive use for 17 years, last in .  She denies infertility treatment to achieve pregnancy.     Medications:    Medications Ordered Today   No outpatient medications have been marked as taking for the 24  encounter (Appointment) with Leida Gillette MD.            Allergies:    [Allergies]    [Allergies]        Allergen Reactions    Adhesive Tape ITCHING and RASH    Triaminic Night Time [Pedia Relief Cough-Cold] HIVES       Pt has taken similar medication as an adult without issue        Family History:   Family History         Family History   Problem Relation Age of Onset    Hypertension Father      Cancer Father           prostate    Hypertension Mother      Heart Disorder Maternal Grandmother      Lung Disorder Maternal Grandmother      Diabetes Maternal Grandfather      Cancer Maternal Grandfather      Colon Cancer Maternal Grandfather      Heart Disorder Maternal Grandfather      Other (Other) Paternal Grandmother      Other (Other) Paternal Grandfather      Pancreatic Cancer Paternal Uncle      Breast Cancer Other           mat great aunt            She is not of Ashkenazi Evangelical ancestry.     Social History:       History   Alcohol Use    0.0 standard drinks of alcohol/week       Comment: socially             History   Smoking Status    Never   Smokeless Tobacco    Never      Ms. Kimi Mariscal is  with 0 children. She has 2 siblings. She is currently Employed full-time     Review of Systems:  General:   The patient denies, fever, chills, night sweats, fatigue, generalized weakness, change in appetite or weight loss.     HEENT:     The patient denies eye irritation, cataracts, redness, glaucoma, yellowing of the eyes, change in vision, color blindness, or +wearing contacts/glasses. The patient denies hearing loss, ringing in the ears, ear drainage, earaches, nasal congestion, nose bleeds, snoring, pain in mouth/throat, hoarseness, change in voice, facial trauma.     Respiratory:  The patient denies chronic cough, phlegm, hemoptysis, pleurisy/chest pain, pneumonia, asthma, wheezing, difficulty in breathing with exertion, emphysema, chronic bronchitis, shortness of breath or abnormal sound when  breathing.      Cardiovascular:  There is no history of chest pain, chest pressure/discomfort, palpitations, irregular heartbeat, +fainting or near-fainting, difficulty breathing when lying flat, SOB/Coughing at night, swelling of the legs or chest pain while walking.     Breasts:  See history of present illness     Gastrointestinal:     There is no history of difficulty or pain with swallowing, +reflux symptoms, vomiting, dark or bloody stools, constipation, yellowing of the skin, indigestion, nausea, change in bowel habits, diarrhea, abdominal pain or vomiting blood.      Genitourinary:  The patient denies frequent urination, needing to get up at night to urinate, urinary hesitancy or retaining urine, painful urination, urinary incontinence, decreased urine stream, blood in the urine or vaginal/penile discharge.     Skin:    The patient denies +rash, itching, skin lesions, dry skin, change in skin color or change in moles.      Hematologic/Lymphatic:  The patient denies easily bruising or bleeding or persistent swollen glands or lymph nodes.      Musculoskeletal:  The patient denies muscle aches/pain, joint pain, stiff joints, neck pain, back pain or bone pain.     Neuropsychiatric:  There is no history of migraines or severe headaches, seizure/epilepsy, speech problems, coordination problems, trembling/tremors, fainting/black outs, dizziness, memory problems, loss of sensation/numbness, problems walking, weakness, tingling or burning in hands/feet. There is no history of abusive relationship, bipolar disorder, sleep disturbance, anxiety, depression or feeling of despair.     Endocrine:    There is no history of poor/slow wound healing, weight loss/gain, fertility or hormone problems, cold intolerance, thyroid disease.      Allergic/Immunologic:  There is no history of hives, hay fever, angioedema or anaphylaxis.     BP (!) 135/94 (BP Location: Left arm, Patient Position: Sitting, Cuff Size: adult)   Pulse 76    Temp 98.2 °F (36.8 °C) (Temporal)   Resp 16   Ht 1.651 m (5' 5\")   Wt 81.6 kg (180 lb)   LMP 10/27/2024 (Approximate)   SpO2 100%   BMI 29.95 kg/m²      Physical Exam:  The patient is an alert, oriented, well-nourished and  well-developed woman who appears her stated age. Her speech patterns and movements are normal. Her affect is appropriate.     HEENT: The head is normocephalic. The neck is supple. The thyroid is not enlarged and is without palpable masses/nodules. There are no palpable masses. The trachea is in the midline. Conjunctiva are clear, non-icteric.     Chest: The chest expands symmetrically. The lungs are clear to auscultation.     Heart: The rhythm is regular.  There are no murmurs, rubs, gallops or thrills.     Breasts:  Her breasts are symmetrical with a cup size 34D.  Right breast: The skin, nipple with piercing,and areola appear normal. There is no skin dimpling with movement of the pectoralis. There is no nipple retraction. No nipple discharge can be elicited. The parenchyma is mildly nodular. There is a dominant smooth and mobile palpable mass measuring approximately 3 x 3 cm at 10:00, 7 cm from the nipple. The axillary tail is normal.  Left breast:   The skin, nipple, and areola appear normal. There is no skin dimpling with movement of the pectoralis. There is no nipple retraction. No nipple discharge can be elicited. The parenchyma is mildly nodular. There are no dominant masses in the breast. The axillary tail is normal.     Abdomen:  The abdomen is soft, flat and non tender. The liver is not enlarged. There are no palpable masses.     Lymph Nodes:  The supraclavicular, axillary and cervical regions are free of significant lymphadenopathy.     Back: There is no vertebral column tenderness.     Skin: The skin appears normal. There are no suspicious appearing rashes or lesions.     Extremities: The extremities are without deformity, cyanosis or edema.     Impression:   Ms. Kimi Anderson  Davey is a 34 year old woman presents with history of bilateral breast fibroadenomas with biopsy confirmed, enlarging right breast fibroepithelial lesion.     Discussion and Plan:  I had a discussion with the Patient regarding her breast exam. On exam today I found palpable mass corresponding the biopsy confirmed fibroepithelial lesion in the right breast with no other clinical findings bilaterally.  I personally reviewed the recent imaging and pathology and we discussed this at length.     We did discuss that fibroadenomas are not thought to be direct precursor lesions nor are they considered to be markers of a higher risk for developing breast cancer in the future. However, fibroadenomas can grown with time and become symptomatic which can prompt surgical excision. Growth of the lesion over time warrants excision to distinguish the lesion from a proliferative Phyllodes tumor. In light of the fact that this lesion has increased in size and symptomatology and cannot be confirmed pathologically with the core biopsy alone I am recommending excision of the right breast mass.   The risks and possible complications of the procedure were explained to the patient and her family and she understood and agreed to the proposed plan. She was given ample opportunity for questions and those questions were answered to her satisfaction. She has been  encouraged to contact the office with any questions or concerns prior to her next appointment.     Pre-op Diagnosis: Mass of right breast, unspecified quadrant [N63.10]    The above referenced H&P was reviewed by Leida Gillette MD on 1/31/2025, the patient was examined and no significant changes have occurred in the patient's condition since the H&P was performed.  I discussed with the patient and/or legal representative the potential benefits, risks and side effects of this procedure; the likelihood of the patient achieving goals; and potential problems that might occur during  recuperation.  I discussed reasonable alternatives to the procedure, including risks, benefits and side effects related to the alternatives and risks related to not receiving this procedure.  We will proceed with procedure as planned.

## 2025-02-04 ENCOUNTER — OFFICE VISIT (OUTPATIENT)
Dept: FAMILY MEDICINE CLINIC | Facility: CLINIC | Age: 35
End: 2025-02-04
Payer: COMMERCIAL

## 2025-02-04 VITALS
SYSTOLIC BLOOD PRESSURE: 133 MMHG | BODY MASS INDEX: 29.66 KG/M2 | HEIGHT: 65 IN | DIASTOLIC BLOOD PRESSURE: 83 MMHG | WEIGHT: 178 LBS | HEART RATE: 75 BPM

## 2025-02-04 DIAGNOSIS — L98.9 EXTERNAL NASAL LESION: ICD-10-CM

## 2025-02-04 DIAGNOSIS — L23.9 ALLERGIC DERMATITIS: Primary | ICD-10-CM

## 2025-02-04 PROCEDURE — 99212 OFFICE O/P EST SF 10 MIN: CPT | Performed by: PHYSICIAN ASSISTANT

## 2025-02-04 RX ORDER — PREDNISONE 20 MG/1
20 TABLET ORAL DAILY
Qty: 7 TABLET | Refills: 0 | Status: SHIPPED | OUTPATIENT
Start: 2025-02-04 | End: 2025-02-11

## 2025-02-04 RX ORDER — TRIAMCINOLONE ACETONIDE 1 MG/G
CREAM TOPICAL 2 TIMES DAILY PRN
Qty: 60 G | Refills: 0 | Status: SHIPPED | OUTPATIENT
Start: 2025-02-04 | End: 2025-02-05 | Stop reason: ALTCHOICE

## 2025-02-04 NOTE — PROGRESS NOTES
HPI:     HPI  A 34-year-old woman presents with rashes around her surgical incision that has lasted for the past 5 days. The rash is red and itchy. She denies having fever, pain, or swelling.    Medications:     Current Outpatient Medications   Medication Sig Dispense Refill    predniSONE 20 MG Oral Tab Take 1 tablet (20 mg total) by mouth daily for 7 days. 7 tablet 0    amLODIPine 10 MG Oral Tab Take 1 tablet (10 mg total) by mouth daily. 90 tablet 3    labetalol 100 MG Oral Tab Take 1 tablet (100 mg total) by mouth daily. 90 tablet 3       Allergies:   Allergies[1]    History:     Health Maintenance   Topic Date Due    COVID-19 Vaccine (4 - 2024-25 season) 09/01/2024    Annual Depression Screening  01/01/2025    Annual Physical  12/06/2025    Pap Smear  12/15/2026    DTaP,Tdap,and Td Vaccines (2 - Td or Tdap) 01/05/2034    Influenza Vaccine  Completed    Pneumococcal Vaccine: Birth to 50yrs  Aged Out    Meningococcal B Vaccine  Aged Out       Patient's last menstrual period was 01/21/2025 (approximate).   Past Medical History:     Past Medical History:    Essential hypertension    GERD (gastroesophageal reflux disease)    High blood pressure    Hx of motion sickness    when in a moving vehicle    Vertigo    Visual impairment    contact lenses and glasses       Past Surgical History:     Past Surgical History:   Procedure Laterality Date    Needle biopsy left      Needle biopsy right  11/15/2024    Upper gi endoscopy,exam         Family History:     Family History   Problem Relation Age of Onset    Hypertension Father     Cancer Father         prostate    Hypertension Mother     Heart Disorder Maternal Grandmother     Lung Disorder Maternal Grandmother     Diabetes Maternal Grandfather     Cancer Maternal Grandfather     Colon Cancer Maternal Grandfather     Heart Disorder Maternal Grandfather     Other (Other) Paternal Grandmother     Other (Other) Paternal Grandfather     Pancreatic Cancer Paternal Uncle      Breast Cancer Other         mat great aunt       Social History:     Social History     Socioeconomic History    Marital status:      Spouse name: Not on file    Number of children: Not on file    Years of education: Not on file    Highest education level: Not on file   Occupational History    Not on file   Tobacco Use    Smoking status: Never    Smokeless tobacco: Never   Vaping Use    Vaping status: Never Used   Substance and Sexual Activity    Alcohol use: Yes     Alcohol/week: 0.0 standard drinks of alcohol     Comment: socially    Drug use: No    Sexual activity: Not on file   Other Topics Concern    Not on file   Social History Narrative    Not on file     Social Drivers of Health     Food Insecurity: Not on file   Transportation Needs: Not on file   Stress: Not on file   Housing Stability: Not on file       Review of Systems:   Review of Systems   Skin:  Positive for rash.        Vitals:    02/04/25 1629   BP: 133/83   Pulse: 75   Weight: 178 lb (80.7 kg)   Height: 5' 5\" (1.651 m)     Body mass index is 29.62 kg/m².    Physical Exam:   Physical Exam  Vitals reviewed.   Skin:     Findings: Rash present.      There is a mole on her nostril.  There are erythema macular rashes on the right breast.      Assessment and Plan::     Assessment & Plan  External nasal lesion    Orders:    ENT Referral - [place] ([facility])    Allergic dermatitis    Orders:    predniSONE 20 MG Oral Tab; Take 1 tablet (20 mg total) by mouth daily for 7 days.       Discussed plan of care with pt and pt is in agreement.All questions answered. Pt to call with questions or concerns.         [1]   Allergies  Allergen Reactions    Duraprep HIVES    Adhesive Tape RASH and ITCHING     USE DERMABOND NOT STERI STRIPS    Chlorhexidine RASH    Triaminic Night Time [Pedia Relief Cough-Cold] HIVES     Pt has taken similar medication as an adult without issue

## 2025-02-05 ENCOUNTER — OFFICE VISIT (OUTPATIENT)
Facility: CLINIC | Age: 35
End: 2025-02-05
Payer: COMMERCIAL

## 2025-02-05 VITALS
HEART RATE: 90 BPM | WEIGHT: 177.63 LBS | TEMPERATURE: 98 F | BODY MASS INDEX: 30 KG/M2 | OXYGEN SATURATION: 95 % | SYSTOLIC BLOOD PRESSURE: 134 MMHG | RESPIRATION RATE: 18 BRPM | DIASTOLIC BLOOD PRESSURE: 81 MMHG

## 2025-02-05 DIAGNOSIS — D24.1 BENIGN PHYLLODES TUMOR OF BREAST, RIGHT: Primary | ICD-10-CM

## 2025-02-05 PROCEDURE — 99024 POSTOP FOLLOW-UP VISIT: CPT | Performed by: SURGERY

## 2025-02-06 PROBLEM — D24.1: Status: ACTIVE | Noted: 2025-02-06

## 2025-02-06 NOTE — PROGRESS NOTES
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    History:   This 34 year old woman presented with history of bilateral breast biopsies.  Patient had initially been found to have a palpable concern in her contralateral breast several years ago at which time she underwent biopsy of 2 sites of fibroadenomas in her left breast.  In  she was also noted to have an 8 mm mass at the 10 o'clock position of her right breast as well as a benign-appearing mass in the 9 o'clock position of her right breast.  She had a bilateral diagnostic evaluation on 2024 and was found to have interval enlargement of the 10:00 mass up to 3.3 cm in size for which biopsy was recommended.  She had the ultrasound-guided biopsy on November 15, 2024 which confirmed fragments of a fibroepithelial lesion.  She does not have any known family history of breast cancer.  She denies any nipple discharge, skin changes or other concerns.  She did develop a rash at the distribution of her surgical wrap postoperatively but now has been feeling better since starting a steroid course prescribed by her primary care physician.  She is here today for evaluation and recommendations for further therapy.        Past Medical History:    Essential hypertension    GERD (gastroesophageal reflux disease)    High blood pressure    Hx of motion sickness    when in a moving vehicle    Vertigo    Visual impairment    contact lenses and glasses       Past Surgical History:   Procedure Laterality Date    Needle biopsy left      Needle biopsy right  11/15/2024    Upper gi endoscopy,exam         Gynecological History:  Pt is a   She achieved menarche at age 10 and LMP 2024  She denies any history of hormone replacement therapy   She has history of oral contraceptive use for 17 years, last in  2024.  She denies infertility treatment to achieve pregnancy.    Medications:     predniSONE 20 MG Oral Tab Take 1 tablet (20 mg total) by mouth daily for 7 days. 7 tablet 0    amLODIPine 10 MG Oral Tab Take 1 tablet (10 mg total) by mouth daily. 90 tablet 3    labetalol 100 MG Oral Tab Take 1 tablet (100 mg total) by mouth daily. 90 tablet 3       Allergies:    Allergies[1]    Family History:   Family History   Problem Relation Age of Onset    Hypertension Father     Cancer Father         prostate    Hypertension Mother     Heart Disorder Maternal Grandmother     Lung Disorder Maternal Grandmother     Diabetes Maternal Grandfather     Cancer Maternal Grandfather     Colon Cancer Maternal Grandfather     Heart Disorder Maternal Grandfather     Other (Other) Paternal Grandmother     Other (Other) Paternal Grandfather     Pancreatic Cancer Paternal Uncle     Breast Cancer Other         mat great aunt       She is not of Ashkenazi Islam ancestry.    Social History:  History   Alcohol Use    0.0 standard drinks of alcohol/week     Comment: socially       History   Smoking Status    Never   Smokeless Tobacco    Never     Ms. Kimi Mariscal is  with 0 children. She has 2 siblings. She is currently Employed full-time    Review of Systems:  General:   The patient denies, fever, chills, night sweats, fatigue, generalized weakness, change in appetite or weight loss.    HEENT:     The patient denies eye irritation, cataracts, redness, glaucoma, yellowing of the eyes, change in vision, color blindness, or +wearing contacts/glasses. The patient denies hearing loss, ringing in the ears, ear drainage, earaches, nasal congestion, nose bleeds, snoring, pain in mouth/throat, hoarseness, change in voice, facial trauma.    Respiratory:  The patient denies chronic cough, phlegm, hemoptysis, pleurisy/chest pain, pneumonia, asthma, wheezing, difficulty in breathing with exertion, emphysema, chronic bronchitis, shortness of  breath or abnormal sound when breathing.     Cardiovascular:  There is no history of chest pain, chest pressure/discomfort, palpitations, irregular heartbeat, +fainting or near-fainting, difficulty breathing when lying flat, SOB/Coughing at night, swelling of the legs or chest pain while walking.    Breasts:  See history of present illness    Gastrointestinal:     There is no history of difficulty or pain with swallowing, +reflux symptoms, vomiting, dark or bloody stools, constipation, yellowing of the skin, indigestion, nausea, change in bowel habits, diarrhea, abdominal pain or vomiting blood.     Genitourinary:  The patient denies frequent urination, needing to get up at night to urinate, urinary hesitancy or retaining urine, painful urination, urinary incontinence, decreased urine stream, blood in the urine or vaginal/penile discharge.    Skin:    The patient denies +rash, itching, skin lesions, dry skin, change in skin color or change in moles.     Hematologic/Lymphatic:  The patient denies easily bruising or bleeding or persistent swollen glands or lymph nodes.     Musculoskeletal:  The patient denies muscle aches/pain, joint pain, stiff joints, neck pain, back pain or bone pain.    Neuropsychiatric:  There is no history of migraines or severe headaches, seizure/epilepsy, speech problems, coordination problems, trembling/tremors, fainting/black outs, dizziness, memory problems, loss of sensation/numbness, problems walking, weakness, tingling or burning in hands/feet. There is no history of abusive relationship, bipolar disorder, sleep disturbance, anxiety, depression or feeling of despair.    Endocrine:    There is no history of poor/slow wound healing, weight loss/gain, fertility or hormone problems, cold intolerance, thyroid disease.     Allergic/Immunologic:  There is no history of hives, hay fever, angioedema or anaphylaxis.    /81 (BP Location: Right arm, Patient Position: Sitting, Cuff Size: adult)    Pulse 90   Temp 98 °F (36.7 °C) (Temporal)   Resp 18   Wt 80.6 kg (177 lb 9.6 oz)   LMP 01/21/2025 (Approximate)   SpO2 95%   BMI 29.55 kg/m²     Physical Exam:  The patient is an alert, oriented, well-nourished and  well-developed woman who appears her stated age. Her speech patterns and movements are normal. Her affect is appropriate.    HEENT: The head is normocephalic. The neck is supple. The thyroid is not enlarged and is without palpable masses/nodules. There are no palpable masses. The trachea is in the midline. Conjunctiva are clear, non-icteric.    Chest: The chest expands symmetrically. The lungs are clear to auscultation.    Heart: The rhythm is regular.  There are no murmurs, rubs, gallops or thrills.    Breasts:  Her breasts are symmetrical with a cup size 34D.  Right breast: The skin demonstrates a maculopapular rash in the distribution of the surgical prep, nipple with piercing,and areola appear normal. There is no skin dimpling with movement of the pectoralis. There is no nipple retraction. No nipple discharge can be elicited. The parenchyma is mildly nodular. There is a palpable mass in the breast. The axillary tail is normal.  Left breast:   The skin, nipple, and areola appear normal. There is no skin dimpling with movement of the pectoralis. There is no nipple retraction. No nipple discharge can be elicited. The parenchyma is mildly nodular. There are no dominant masses in the breast. The axillary tail is normal.    Abdomen:  The abdomen is soft, flat and non tender. The liver is not enlarged. There are no palpable masses.    Lymph Nodes:  The supraclavicular, axillary and cervical regions are free of significant lymphadenopathy.    Back: There is no vertebral column tenderness.    Skin: The skin appears normal. There are no suspicious appearing rashes or lesions.    Extremities: The extremities are without deformity, cyanosis or edema.    Impression:   Ms. Kimi Mariscal is a 34  year old woman presents with history of bilateral breast fibroadenomas with biopsy confirmed, enlarging right breast fibroepithelial lesion status post excision of benign phyllodes tumor.    Discussion and Plan:  I had a discussion with the Patient regarding her breast exam. On exam today I found her to be healing well since surgery with no signs of infection at the surgical site.  She has had however had a allergy to the surgical prep and is tolerating a steroid course for management.  I do not recommend any additional intervention at this time but she should contact me if the wound does not appear to heal well.  I discussed the pathology including the finding of a benign phyllodes tumor.  We discussed that her final margins were negative but that she will be monitored carefully for possible recurrence.  I will see her in 6 months for her next clinical exam and we will determine at that time when the next imaging evaluation should take place. She was encouraged to contact the office with any questions or concerns prior to her next scheduled appointment.        [1]   Allergies  Allergen Reactions    Duraprep HIVES    Adhesive Tape RASH and ITCHING     USE DERMABOND NOT STERI STRIPS    Chlorhexidine RASH    Triaminic Night Time [Pedia Relief Cough-Cold] HIVES     Pt has taken similar medication as an adult without issue

## 2025-06-10 ENCOUNTER — HOSPITAL ENCOUNTER (EMERGENCY)
Facility: HOSPITAL | Age: 35
Discharge: HOME | End: 2025-06-10
Attending: EMERGENCY MEDICINE
Payer: COMMERCIAL

## 2025-06-10 ENCOUNTER — APPOINTMENT (OUTPATIENT)
Dept: ULTRASOUND IMAGING | Facility: HOSPITAL | Age: 35
End: 2025-06-10
Attending: EMERGENCY MEDICINE
Payer: COMMERCIAL

## 2025-06-10 VITALS
HEART RATE: 76 BPM | DIASTOLIC BLOOD PRESSURE: 86 MMHG | HEIGHT: 65 IN | WEIGHT: 175 LBS | SYSTOLIC BLOOD PRESSURE: 117 MMHG | RESPIRATION RATE: 15 BRPM | OXYGEN SATURATION: 98 % | BODY MASS INDEX: 29.16 KG/M2

## 2025-06-10 DIAGNOSIS — K29.00 ACUTE GASTRITIS WITHOUT HEMORRHAGE, UNSPECIFIED GASTRITIS TYPE: Primary | ICD-10-CM

## 2025-06-10 LAB
ALBUMIN SERPL-MCNC: 4.9 G/DL (ref 3.2–4.8)
ALBUMIN/GLOB SERPL: 1.6 {RATIO} (ref 1–2)
ALP LIVER SERPL-CCNC: 67 U/L (ref 37–98)
ALT SERPL-CCNC: 15 U/L (ref 10–49)
ANION GAP SERPL CALC-SCNC: 9 MMOL/L (ref 0–18)
AST SERPL-CCNC: 16 U/L (ref ?–34)
B-HCG UR QL: NEGATIVE
BASOPHILS # BLD AUTO: 0.04 X10(3) UL (ref 0–0.2)
BASOPHILS NFR BLD AUTO: 0.4 %
BILIRUB SERPL-MCNC: 0.5 MG/DL (ref 0.3–1.2)
BILIRUB UR QL: NEGATIVE
BUN BLD-MCNC: 15 MG/DL (ref 9–23)
BUN/CREAT SERPL: 17.2 (ref 10–20)
CALCIUM BLD-MCNC: 9.5 MG/DL (ref 8.7–10.4)
CHLORIDE SERPL-SCNC: 104 MMOL/L (ref 98–112)
CLARITY UR: CLEAR
CO2 SERPL-SCNC: 26 MMOL/L (ref 21–32)
CREAT BLD-MCNC: 0.87 MG/DL (ref 0.55–1.02)
DEPRECATED RDW RBC AUTO: 39 FL (ref 35.1–46.3)
EGFRCR SERPLBLD CKD-EPI 2021: 90 ML/MIN/1.73M2 (ref 60–?)
EOSINOPHIL # BLD AUTO: 0.08 X10(3) UL (ref 0–0.7)
EOSINOPHIL NFR BLD AUTO: 0.8 %
ERYTHROCYTE [DISTWIDTH] IN BLOOD BY AUTOMATED COUNT: 12.4 % (ref 11–15)
GLOBULIN PLAS-MCNC: 3.1 G/DL (ref 2–3.5)
GLUCOSE BLD-MCNC: 104 MG/DL (ref 70–99)
GLUCOSE UR-MCNC: NORMAL MG/DL
HCT VFR BLD AUTO: 40.3 % (ref 35–48)
HGB BLD-MCNC: 13.5 G/DL (ref 12–16)
HGB UR QL STRIP.AUTO: NEGATIVE
IMM GRANULOCYTES # BLD AUTO: 0.02 X10(3) UL (ref 0–1)
IMM GRANULOCYTES NFR BLD: 0.2 %
KETONES UR-MCNC: NEGATIVE MG/DL
LEUKOCYTE ESTERASE UR QL STRIP.AUTO: NEGATIVE
LIPASE SERPL-CCNC: 29 U/L (ref 12–53)
LYMPHOCYTES # BLD AUTO: 1.88 X10(3) UL (ref 1–4)
LYMPHOCYTES NFR BLD AUTO: 18.3 %
MCH RBC QN AUTO: 28.9 PG (ref 26–34)
MCHC RBC AUTO-ENTMCNC: 33.5 G/DL (ref 31–37)
MCV RBC AUTO: 86.3 FL (ref 80–100)
MONOCYTES # BLD AUTO: 0.54 X10(3) UL (ref 0.1–1)
MONOCYTES NFR BLD AUTO: 5.2 %
NEUTROPHILS # BLD AUTO: 7.74 X10 (3) UL (ref 1.5–7.7)
NEUTROPHILS # BLD AUTO: 7.74 X10(3) UL (ref 1.5–7.7)
NEUTROPHILS NFR BLD AUTO: 75.1 %
NITRITE UR QL STRIP.AUTO: NEGATIVE
OSMOLALITY SERPL CALC.SUM OF ELEC: 289 MOSM/KG (ref 275–295)
PH UR: 6.5 [PH] (ref 5–8)
PLATELET # BLD AUTO: 318 10(3)UL (ref 150–450)
POTASSIUM SERPL-SCNC: 3.6 MMOL/L (ref 3.5–5.1)
PROT SERPL-MCNC: 8 G/DL (ref 5.7–8.2)
PROT UR-MCNC: NEGATIVE MG/DL
RBC # BLD AUTO: 4.67 X10(6)UL (ref 3.8–5.3)
SODIUM SERPL-SCNC: 139 MMOL/L (ref 136–145)
SP GR UR STRIP: 1.03 (ref 1–1.03)
UROBILINOGEN UR STRIP-ACNC: NORMAL
WBC # BLD AUTO: 10.3 X10(3) UL (ref 4–11)

## 2025-06-10 PROCEDURE — 96361 HYDRATE IV INFUSION ADD-ON: CPT

## 2025-06-10 PROCEDURE — 99284 EMERGENCY DEPT VISIT MOD MDM: CPT

## 2025-06-10 PROCEDURE — 99285 EMERGENCY DEPT VISIT HI MDM: CPT

## 2025-06-10 PROCEDURE — 80053 COMPREHEN METABOLIC PANEL: CPT | Performed by: EMERGENCY MEDICINE

## 2025-06-10 PROCEDURE — 81025 URINE PREGNANCY TEST: CPT

## 2025-06-10 PROCEDURE — 96374 THER/PROPH/DIAG INJ IV PUSH: CPT

## 2025-06-10 PROCEDURE — 83690 ASSAY OF LIPASE: CPT | Performed by: EMERGENCY MEDICINE

## 2025-06-10 PROCEDURE — 85025 COMPLETE CBC W/AUTO DIFF WBC: CPT | Performed by: EMERGENCY MEDICINE

## 2025-06-10 PROCEDURE — 76705 ECHO EXAM OF ABDOMEN: CPT | Performed by: EMERGENCY MEDICINE

## 2025-06-10 PROCEDURE — 81003 URINALYSIS AUTO W/O SCOPE: CPT | Performed by: EMERGENCY MEDICINE

## 2025-06-10 RX ORDER — PANTOPRAZOLE SODIUM 40 MG/1
40 TABLET, DELAYED RELEASE ORAL DAILY
Qty: 30 TABLET | Refills: 0 | Status: SHIPPED | OUTPATIENT
Start: 2025-06-10 | End: 2025-07-10

## 2025-06-10 RX ORDER — SUCRALFATE ORAL 1 G/10ML
1 SUSPENSION ORAL
Qty: 560 ML | Refills: 0 | Status: SHIPPED | OUTPATIENT
Start: 2025-06-10 | End: 2025-06-24

## 2025-06-10 RX ORDER — FAMOTIDINE 10 MG/ML
20 INJECTION, SOLUTION INTRAVENOUS ONCE
Status: COMPLETED | OUTPATIENT
Start: 2025-06-10 | End: 2025-06-10

## 2025-06-10 RX ORDER — SUCRALFATE ORAL 1 G/10ML
1 SUSPENSION ORAL ONCE
Status: COMPLETED | OUTPATIENT
Start: 2025-06-10 | End: 2025-06-10

## 2025-06-10 NOTE — ED PROVIDER NOTES
Patient Seen in: [facility] Emergency Department        History  No chief complaint on file.    Stated Complaint: abd pain    Subjective:   HPI            Patient presents the emergency department complaining of upper abdominal pain.  She states that she has a burning discomfort in her upper abdomen which began about 12:00 this afternoon.  She is a PACU nurse and despite eating food and trying Prilosec and Mylanta she still continued to have pain in her upper abdomen.  There is nausea but no vomiting.  There is no fever or chills.  She has a history of reflux.  She believes that 10 years ago when she had similar symptoms she had an ultrasound which showed stones.  She has no surgical history.      Objective:     Past Medical History:    Essential hypertension    GERD (gastroesophageal reflux disease)    High blood pressure    Hx of motion sickness    when in a moving vehicle    Vertigo    Visual impairment    contact lenses and glasses              Past Surgical History:   Procedure Laterality Date    Needle biopsy left      Needle biopsy right  11/15/2024    Upper gi endoscopy,exam                  Social History     Socioeconomic History    Marital status:    Tobacco Use    Smoking status: Never    Smokeless tobacco: Never   Vaping Use    Vaping status: Never Used   Substance and Sexual Activity    Alcohol use: Yes     Alcohol/week: 0.0 standard drinks of alcohol     Comment: socially    Drug use: No                                Physical Exam    ED Triage Vitals [06/10/25 1737]   BP (!) 134/96   Pulse 70   Resp 18   Temp    Temp src    SpO2 98 %   O2 Device None (Room air)       Current Vitals:   Vital Signs  BP: (!) 127/99  Pulse: 86  Resp: 21  MAP (mmHg): (!) 108    Oxygen Therapy  SpO2: 98 %  O2 Device: None (Room air)            Physical Exam  Vitals and nursing note reviewed.   Constitutional:       General: She is not in acute distress.     Appearance: She is well-developed.   HENT:       Head: Normocephalic.      Nose: Nose normal.      Mouth/Throat:      Mouth: Mucous membranes are moist.   Eyes:      Conjunctiva/sclera: Conjunctivae normal.   Cardiovascular:      Rate and Rhythm: Normal rate and regular rhythm.      Heart sounds: No murmur heard.  Pulmonary:      Effort: Pulmonary effort is normal. No respiratory distress.      Breath sounds: Normal breath sounds.   Abdominal:      General: There is no distension.      Palpations: Abdomen is soft.      Tenderness: There is abdominal tenderness in the right upper quadrant, epigastric area and left upper quadrant. There is no guarding or rebound.   Musculoskeletal:         General: No tenderness. Normal range of motion.      Cervical back: Normal range of motion and neck supple.   Skin:     General: Skin is warm and dry.      Findings: No rash.   Neurological:      Mental Status: She is alert and oriented to person, place, and time.                 ED Course  Labs Reviewed   COMP METABOLIC PANEL (14) - Abnormal; Notable for the following components:       Result Value    Glucose 104 (*)     Albumin 4.9 (*)     All other components within normal limits   CBC WITH DIFFERENTIAL WITH PLATELET - Abnormal; Notable for the following components:    Neutrophil Absolute Prelim 7.74 (*)     Neutrophil Absolute 7.74 (*)     All other components within normal limits   LIPASE - Normal   POCT PREGNANCY URINE - Normal   URINALYSIS WITH CULTURE REFLEX                            MDM             Medical Decision Making  Differential diagnosis considered for gastritis, reflux, pancreatitis, biliary colic, cholecystitis.    Problems Addressed:  Acute gastritis without hemorrhage, unspecified gastritis type: acute illness or injury    Amount and/or Complexity of Data Reviewed  Labs: ordered. Decision-making details documented in ED Course.     Details: CBC chemistry panel lipase all normal.  Radiology: ordered and independent interpretation performed. Decision-making  details documented in ED Course.     Details: Ultrasound gallbladder shows a polyp but no stones or evidence of inflammatory changes  Discussion of management or test interpretation with external provider(s): Near complete resolution of symptoms with IV Pepcid and GI cocktail.  10 years ago patient had an EGD with a diagnosis of gastritis.  She states that she periodically will take Prilosec over-the-counter but never consistently.  I recommended she use a PPI daily for the next 2 weeks and follow-up with her GI doctor again for consideration of recheck repeat EGD.  Return for fever or worse condition.    Risk  Prescription drug management.        Disposition and Plan     Clinical Impression:  1. Acute gastritis without hemorrhage, unspecified gastritis type         Disposition:  Discharge  6/10/2025  7:45 pm    Follow-up:  Jayme Dwyer, DO  [address] [facility] [address]  [place] [address] [phone]    Schedule an appointment as soon as possible for a visit      Tomás Suresh MD  [address] [facility] [address]  [facility] [address] [phone]    Schedule an appointment as soon as possible for a visit            Medications Prescribed:  Current Discharge Medication List        START taking these medications    Details   pantoprazole 40 MG Oral Tab EC Take 1 tablet (40 mg total) by mouth daily.  Qty: 30 tablet, Refills: 0      sucralfate 1 GM/10ML Oral Suspension Take 10 mL (1 g total) by mouth 4 (four) times daily before meals and nightly for 14 days.  Qty: 560 mL, Refills: 0                   Supplementary Documentation:

## 2025-06-11 NOTE — ED QUICK NOTES
Rounding Completed    Plan of Care reviewed. Waiting for med, results.  Elimination needs assessed.  Provided update. No needs at this time. Pt's aunt at bedside.     Bed is locked and in lowest position. Call light within reach.

## 2025-06-11 NOTE — DISCHARGE INSTRUCTIONS
Protonix as prescribed. Carafate as directed. Return for worse condition. Drinnk plenty of fluids.

## 2025-08-13 ENCOUNTER — OFFICE VISIT (OUTPATIENT)
Facility: CLINIC | Age: 35
End: 2025-08-13

## 2025-08-13 VITALS
OXYGEN SATURATION: 98 % | BODY MASS INDEX: 29 KG/M2 | SYSTOLIC BLOOD PRESSURE: 126 MMHG | DIASTOLIC BLOOD PRESSURE: 83 MMHG | HEART RATE: 81 BPM | WEIGHT: 175 LBS

## 2025-08-13 DIAGNOSIS — D48.61 PHYLLODES TUMOR, RIGHT: Primary | ICD-10-CM

## 2025-08-13 PROCEDURE — 99213 OFFICE O/P EST LOW 20 MIN: CPT | Performed by: SURGERY

## 2025-08-21 ENCOUNTER — OFFICE VISIT (OUTPATIENT)
Dept: FAMILY MEDICINE CLINIC | Facility: CLINIC | Age: 35
End: 2025-08-21

## 2025-08-21 VITALS
BODY MASS INDEX: 29.99 KG/M2 | SYSTOLIC BLOOD PRESSURE: 130 MMHG | WEIGHT: 180 LBS | HEART RATE: 68 BPM | TEMPERATURE: 99 F | HEIGHT: 65 IN | OXYGEN SATURATION: 98 % | RESPIRATION RATE: 16 BRPM | DIASTOLIC BLOOD PRESSURE: 84 MMHG

## 2025-08-21 DIAGNOSIS — R10.819 SUPRAPUBIC TENDERNESS: ICD-10-CM

## 2025-08-21 DIAGNOSIS — R30.0 DYSURIA: Primary | ICD-10-CM

## 2025-08-21 LAB
APPEARANCE: CLEAR
BILIRUBIN: NEGATIVE
GLUCOSE (URINE DIPSTICK): NEGATIVE MG/DL
KETONES (URINE DIPSTICK): NEGATIVE MG/DL
MULTISTIX LOT#: ABNORMAL NUMERIC
NITRITE, URINE: NEGATIVE
OCCULT BLOOD: NEGATIVE
PH, URINE: 5.5 (ref 4.5–8)
PROTEIN (URINE DIPSTICK): NEGATIVE MG/DL
SPECIFIC GRAVITY: 1.02 (ref 1–1.03)
URINE-COLOR: YELLOW
UROBILINOGEN,SEMI-QN: 0.2 MG/DL (ref 0–1.9)

## 2025-08-21 PROCEDURE — 87077 CULTURE AEROBIC IDENTIFY: CPT | Performed by: NURSE PRACTITIONER

## 2025-08-21 PROCEDURE — 87086 URINE CULTURE/COLONY COUNT: CPT | Performed by: NURSE PRACTITIONER

## 2025-08-21 PROCEDURE — 87186 SC STD MICRODIL/AGAR DIL: CPT | Performed by: NURSE PRACTITIONER

## 2025-08-22 ENCOUNTER — TELEPHONE (OUTPATIENT)
Dept: FAMILY MEDICINE CLINIC | Facility: CLINIC | Age: 35
End: 2025-08-22

## 2025-08-22 ENCOUNTER — RESULTS FOLLOW-UP (OUTPATIENT)
Dept: FAMILY MEDICINE CLINIC | Facility: CLINIC | Age: 35
End: 2025-08-22

## 2025-08-22 DIAGNOSIS — N30.00 ACUTE CYSTITIS WITHOUT HEMATURIA: Primary | ICD-10-CM

## 2025-08-22 RX ORDER — CEPHALEXIN 500 MG/1
500 CAPSULE ORAL 2 TIMES DAILY
Qty: 14 CAPSULE | Refills: 0 | Status: SHIPPED | OUTPATIENT
Start: 2025-08-22 | End: 2025-08-29

## 2025-08-23 ENCOUNTER — TELEPHONE (OUTPATIENT)
Dept: FAMILY MEDICINE CLINIC | Facility: CLINIC | Age: 35
End: 2025-08-23

## 2025-08-31 PROBLEM — D48.61: Status: ACTIVE | Noted: 2025-08-31

## (undated) DIAGNOSIS — R39.9 UTI SYMPTOMS: Primary | ICD-10-CM

## (undated) DIAGNOSIS — Z30.41 ENCOUNTER FOR SURVEILLANCE OF CONTRACEPTIVE PILLS: ICD-10-CM

## (undated) DEVICE — SHEET,DRAPE,70X100,STERILE: Brand: MEDLINE

## (undated) DEVICE — SUT PERMA- 2-0 18IN FS NABSRB BLK 26MM 3/8

## (undated) DEVICE — LACTATED. R IRRIG

## (undated) DEVICE — DISPOSABLE TOURNIQUET CUFF SINGLE BLADDER, DUAL PORT AND QUICK CONNECT CONNECTOR: Brand: COLOR CUFF

## (undated) DEVICE — GAUZE,SPONGE,FLUFF,6"X6.75",STRL,5/TRAY: Brand: MEDLINE

## (undated) DEVICE — DRAPE TAPE: Brand: CONVERTORS

## (undated) DEVICE — CLIP SUR SM TI HRT SHP WIRE HORZ LIG SYS

## (undated) DEVICE — KNEE ARTHROSCOPY CDS: Brand: MEDLINE INDUSTRIES, INC.

## (undated) DEVICE — COVER,MAYO STAND,STERILE: Brand: MEDLINE

## (undated) DEVICE — SUTURE ETHILON 3-0 PS2 1669H

## (undated) DEVICE — SUT MCRYL 4-0 18IN PS-2 ABSRB UD 19MM 3/8 CIR

## (undated) DEVICE — SLEEVE COMPR M KNEE LEN SGL USE KENDALL SCD

## (undated) DEVICE — APPLICATOR SKIN PREP 26ML HI LT ORNG 2% CHG

## (undated) DEVICE — PACK,UNIVERSAL,SPLIT,II: Brand: MEDLINE

## (undated) DEVICE — Device: Brand: JELCO

## (undated) DEVICE — GOWN SURG AERO CHROME XXL

## (undated) DEVICE — SYRINGE MED 10ML LL TIP W/O SFTY DISP

## (undated) DEVICE — CLIP LIG M BLU TI HRT SHP WIRE HORZ

## (undated) DEVICE — PADDING CAST W4INXL4YD 100% COT SFT SELF BOND

## (undated) DEVICE — STERILE POLYISOPRENE POWDER-FREE SURGICAL GLOVES: Brand: PROTEXIS

## (undated) DEVICE — MINOR GENERAL: Brand: MEDLINE INDUSTRIES, INC.

## (undated) DEVICE — PAD,ABDOMINAL,8"X7.5",STERILE,LF,1/PK: Brand: MEDLINE

## (undated) DEVICE — 10K/24K ARTHROSCOPY INFLOW TUBE SET: Brand: 10K/24K

## (undated) DEVICE — 12 ML SYRINGE LUER-LOCK TIP: Brand: MONOJECT

## (undated) DEVICE — GAMMEX® PI HYBRID SIZE 6.5, STERILE POWDER-FREE SURGICAL GLOVE, POLYISOPRENE AND NEOPRENE BLEND: Brand: GAMMEX

## (undated) DEVICE — STERILE POLYISOPRENE POWDER-FREE SURGICAL GLOVES WITH EMOLLIENT COATING: Brand: PROTEXIS

## (undated) DEVICE — BRA SURG ELIZ PINK L

## (undated) DEVICE — [AGGRESSIVE PLUS CUTTER, ARTHROSCOPIC SHAVER BLADE,  DO NOT RESTERILIZE,  DO NOT USE IF PACKAGE IS DAMAGED,  KEEP DRY,  KEEP AWAY FROM SUNLIGHT]: Brand: FORMULA

## (undated) DEVICE — ADHESIVE LIQ 2/3ML VI MASTISOL

## (undated) DEVICE — YANKAUER,FLEXIBLE HANDLE,REGLR CAPACITY: Brand: MEDLINE INDUSTRIES, INC.

## (undated) DEVICE — ANTIBACTERIAL UNDYED BRAIDED (POLYGLACTIN 910), SYNTHETIC ABSORBABLE SUTURE: Brand: COATED VICRYL

## (undated) DEVICE — DRESSING PETRO W3XL3IN OIL EMUL N ADH GZ KNIT

## (undated) NOTE — LETTER
Patient Name: Eddie Adams  YOB: 1990          MRN :  [id number]  Date:  9/23/2020  Referring Physician:  Sasha Purcell  Pt has attended 15 visits in Physical Therapy.        Dx:  Cervicalgia (M54.2)         I -Seated C-ret with OP x10  -Seated C-ret with ext x10    -Wall slide x15 (c/o tired and fatigued)  -Wall push up x20    -Standing Cable;  High ext/rows with 3 kg x20  ea  Low row/ext with 1.5 kg x20 ea  Hori abd with 1.5 kig x20         There Ex:  -SciFit Electronically signed by therapist Catalina Louie PT, DPT, cert MDT    [de-identified] certification required:  No              Charges: Ex x 3     Total Timed Treatment: 38 min  Total Treatment Time: 38 min

## (undated) NOTE — MR AVS SNAPSHOT
[address] [place], [place] [address]denis Castroon  [phone]               Thank you for choosing us for your health care visit with SHAWNEE Rosas. We are glad to serve you and happy to provide you with this summary of your visit.   Please evening and Saturday hours as well. [place] [id number] [phone]      Acute Sinusitis  Acute sinusitis is inflammation (irritation and swelling) of the sinuses.  It is usually due to medication should be taken until it is gone, even if you feel better. However, most sinusitis is caused by viruses, antibiotics will not help a viral infection. © 4124-5929 The [address] [facility], [address] [place], [place] [address]BanksEdgar Tesfaye.  All rights r [email or web address]. If you've recently had a stay at the Hospital you can access your discharge instructions in [employer] by going to Visits < Admission Summaries.  If you've been to the Emergency Department or your doctor's office, you can view yo

## (undated) NOTE — LETTER
12/26/17    James Muñoz  10/24/1990        This document is to verify James Muñoz was seen in the DEPARTMENT [facility] MEDICAL [place] for evaluation and treatment of a medical ailment.      Please excuse the patient from work for the follow date(s):  12/26

## (undated) NOTE — LETTER
[facility] ULTRASOUND  CENTER FOR [facility]  [address] [facility] [address]  Authorization for Imaging Procedure  Date of Procedure:     I hereby authorize Dr. CERNA, my physician and his/her assistants (if applicable), which may include medical students, residents, and/or fellows, to perform the following procedure and administer such anesthesia as may be determined necessary by my physician: ULTRASOUND GUIDED RIGHT BREAST BIOPSY WITH CLIP PLACEMENT on Kimi Mariscal.   2.  I recognize that during the procedure, unforeseen conditions may necessitate additional or different procedures than those listed above. I, therefore, further authorize and request that the above-named physician, assistants, or designees perform such procedures as are, in their judgment, necessary and desirable.    3.  My physician has discussed prior to my procedure the potential benefits, risks and side effects of this procedure; the likelihood of achieving goals; and potential problems that might occur during recuperation. They also discussed reasonable alternatives to the procedure, including risks, benefits, and side effects related to the alternatives and risks related to not receiving this procedure. I have had all my questions answered and I acknowledge that no guarantee has been made as to the result that may be obtained.    4.  Should the need arise during my procedure, which includes change of level of care prior to discharge, I also consent to the administration of blood and/or blood products. Further, I understand that despite careful testing and screening of blood or blood products by collecting agencies, I may still be subject to ill effects as a result of receiving a blood transfusion and/or blood products. The following are some, but not all, of the potential risks that can occur: fever and allergic reactions, hemolytic reactions, transmission of diseases such as Hepatitis, AIDS and  Cytomegalovirus (CMV) and fluid overload. In the event that I wish to have an autologous transfusion of my own blood, or a directed donor transfusion, I will discuss this with my physician.  Check only if Refusing Blood or Blood Products  I understand refusal of blood or blood products as deemed necessary by my physician may have serious consequences to my condition to include possible death. I hereby assume responsibility for my refusal and release the hospital, its personnel, and my physicians from any responsibility for the consequences of my refusal.   [  ] Patient Refuses Blood      5.  I authorize the use of any specimen, organs, tissues, body parts or foreign objects that may be removed from my body during the procedure for diagnosis, research or teaching purposes and their subsequent disposal by hospital authorities. I also authorize the release of specimen test results and/or written reports to my treating physician on the hospital medical staff or other referring or consulting physicians involved in my care, at the discretion of the Pathologist or my treating physician.    6.  I consent to the photographing or videotaping of the procedures to be performed, including appropriate portions of my body for medical, scientific, or educational purposes, provided my identity is not revealed by the pictures or by descriptive texts accompanying them. If the procedure has been photographed/videotaped, the physician will obtain the original picture, image, videotape or CD. The hospital will not be responsible for storage, release or maintenance of the picture, image, tape or CD.   7.  I consent to the presence of a  or observers in the operating room as deemed necessary by my physician or their designees.    8.  I recognize that in the event my procedure results in extended X-Ray/fluoroscopy time, I may develop a skin reaction.    9.  If I have a Do Not Attempt Resuscitation (DNAR) order in place,  that status will be suspended while in the operating room, procedural suite, and during the recovery period unless otherwise explicitly stated by me (or a person authorized to consent on my behalf). The performing physician or my attending physician will determine when the applicable recovery period ends for purposes of reinstating the DNAR order.  10.  I acknowledge that my physician has explained sedation/analgesia administration to me including the risk and benefits I consent to the administration of sedation/analgesia as may be necessary or desirable in the judgment of my physician.      I CERTIFY THAT I HAVE READ AND FULLY UNDERSTAND THE ABOVE CONSENT FOR THE PROCEDURE.     Signature of Patient: _____________________________________________________________  Responsible person in case of minor, unconscious: ____________________________________  Relationship to patient:  __________________________________________________________  Signature of Witness: _______________________________Date: _________Time: __________    Statement of Physician: My signature below affirms that prior to the time of the procedure, I have explained to the patient and/or her guardian, the risks and benefits involved in the proposed treatment and any reasonable alternative to the proposed treatment. I have also explained the risks and benefits involved in the refusal of the proposed treatment and have answered the patient's questions. If I have a significant financial interest in a co-management agreement or a significant financial interest in any product or implant, or other significant relationship used in the procedure/surgery, I have disclosed this and had a discussion with my patient.  Signature of Physician:   _________________________________Date:_____________Time:________    Patient Name: Kimi Olsenra Yamiletjamilah : 10/24/1990  Printed: 2024   Medical Record #: [id number]

## (undated) NOTE — LETTER
Date: 3/21/2024    Patient Name: Kimi Mariscal          To Whom it may concern:    This letter has been written at the patient's request. The above patient was seen at [facility] for treatment of a medical condition.    This patient should be excused from attending work through 3/31/24.        Sincerely,    Jackelyn Holguin PA-C

## (undated) NOTE — LETTER
Date & Time: 8/11/2020, 4:35 PM  [facility]  Encounter Provider(s):    Aleta Hamman, APRN       To Whom It May Concern:    Su Benitez was seen and treated in our department on 8/11/2020. Please excuse from work today.   If you have

## (undated) NOTE — LETTER
[address] [place] [address]  Authorization for Surgical Operation or Procedure  Date: ______________       Time: _______________  1.  I hereby authorize Dr. Valerie Boucher, my physician and the assistant, to perform the following operation an 5. I consent to the photographing of the operations or procedures to be performed for the purposes of advancing medicine, science, and/or education, provided my identity is not revealed.  If the procedure has been videotaped, the physician/surgeon will obta Statement of Physician: My signature below affirms that prior to the time of the procedure, I have explained to the patient and/or her guardian, the risks and benefits involved in the proposed treatment and any reasonable alternative to the proposed treatm

## (undated) NOTE — LETTER
[address] [place] [address]  Authorization for Surgical Operation or Procedure  Date: ______________       Time: _______________  1.  I hereby authorize Dr. Zaki Blackwood , my physician and the assistant, to perform the following operation a 5. I consent to the photographing of the operations or procedures to be performed for the purposes of advancing medicine, science, and/or education, provided my identity is not revealed.  If the procedure has been videotaped, the physician/surgeon will obta Statement of Physician: My signature below affirms that prior to the time of the procedure, I have explained to the patient and/or her guardian, the risks and benefits involved in the proposed treatment and any reasonable alternative to the proposed treatm

## (undated) NOTE — LETTER
23    Orthopedic Surgery   Pre-Operative Clearance Request    Patient Name:   Morena Chairez             :   10/24/1990    Surgeon: Dr. Leigha Noriega             Date of Surgery: 24    Surgical Procedure: LEFT KNEE ARTHROSCOPY, REMOVAL OF LOOSE BODY AND CHONDROPLASTY. Please complete all of the following 2-3 weeks PRIOR TO your scheduled surgery to avoid potential cancellation. [x]  History and Physical        [x]  Medical  Clearance                     Required pre-op testing to be ordered: N/A                            **Please fax test results, H&P, and clearance to [phone] and to [facility] at [phone]**

## (undated) NOTE — LETTER
[facility] [place] [address]  [address] [facility]  Dept: [phone]  Dept Fax: [phone] 49: [phone]      July 31, 2017    Patient: Dinh Nix   Date of Visit: 7/31/2017       To Whom It May Concern:    Greta Rawls

## (undated) NOTE — Clinical Note
Date: 1/2/2017    Patient Name: Libby Voss          To Whom it may concern: This letter has been written at the patient's request. The above patient was seen at the [facility] for treatment of a medical condition.     This patient should